# Patient Record
Sex: MALE | Race: BLACK OR AFRICAN AMERICAN | NOT HISPANIC OR LATINO | Employment: OTHER | URBAN - METROPOLITAN AREA
[De-identification: names, ages, dates, MRNs, and addresses within clinical notes are randomized per-mention and may not be internally consistent; named-entity substitution may affect disease eponyms.]

---

## 2017-01-03 ENCOUNTER — OFFICE VISIT (OUTPATIENT)
Dept: FAMILY MEDICINE CLINIC | Facility: CLINIC | Age: 72
End: 2017-01-03

## 2017-01-03 VITALS
HEART RATE: 85 BPM | BODY MASS INDEX: 22.44 KG/M2 | SYSTOLIC BLOOD PRESSURE: 162 MMHG | WEIGHT: 165.7 LBS | HEIGHT: 72 IN | TEMPERATURE: 98 F | DIASTOLIC BLOOD PRESSURE: 100 MMHG | OXYGEN SATURATION: 96 %

## 2017-01-03 DIAGNOSIS — I82.509 CHRONIC DEEP VEIN THROMBOSIS (DVT) OF LOWER EXTREMITY, UNSPECIFIED LATERALITY, UNSPECIFIED VEIN (HCC): Primary | ICD-10-CM

## 2017-01-03 DIAGNOSIS — Z79.01 LONG TERM CURRENT USE OF ANTICOAGULANTS WITH INR GOAL OF 2.0-3.0: ICD-10-CM

## 2017-01-03 LAB — INR PPP: 1.8 (ref 0.9–1.1)

## 2017-01-03 PROCEDURE — 85610 PROTHROMBIN TIME: CPT | Performed by: INTERNAL MEDICINE

## 2017-01-03 PROCEDURE — 36416 COLLJ CAPILLARY BLOOD SPEC: CPT | Performed by: INTERNAL MEDICINE

## 2017-01-03 PROCEDURE — 99213 OFFICE O/P EST LOW 20 MIN: CPT | Performed by: INTERNAL MEDICINE

## 2017-01-03 RX ORDER — DOXYCYCLINE HYCLATE 100 MG/1
100 CAPSULE ORAL 2 TIMES DAILY
Qty: 20 CAPSULE | Refills: 0 | Status: SHIPPED | OUTPATIENT
Start: 2017-01-03 | End: 2017-03-24

## 2017-01-03 NOTE — MR AVS SNAPSHOT
Narciso Romano   1/3/2017 7:50 AM   Office Visit    Dept Phone:  [phone]   Encounter #:  [id number]    Provider:  Jacinto Black Jr., MD   Department:  [facility] FAMILY AND INTERNAL MED                Your Full Care Plan              Today's Medication Changes          These changes are accurate as of: 1/3/17  8:19 AM.  If you have any questions, ask your nurse or doctor.               New Medication(s)Ordered:     doxycycline 100 MG capsule   Commonly known as:  VIBRAMYCIN   Take 1 capsule by mouth 2 (Two) Times a Day.   Started by:  Jacinto Black Jr., MD         Stop taking medication(s)listed here:     amoxicillin 500 MG capsule   Commonly known as:  AMOXIL   Stopped by:  Jacinto Black Jr., MD           clindamycin 300 MG capsule   Commonly known as:  CLEOCIN   Stopped by:  Jacinto Black Jr., MD                Where to Get Your Medications      These medications were sent to [facility]/pharmacy #[id number] [place] [id number] [place] [address] RD AT [facility] [phone] [place] [phone] [place] [address]     Phone:  [phone]     doxycycline 100 MG capsule                  Your Updated Medication List          This list is accurate as of: 1/3/17  8:19 AM.  Always use your most recent med list.                acyclovir 800 MG tablet   Commonly known as:  ZOVIRAX       albuterol 108 (90 BASE) MCG/ACT inhaler   Commonly known as:  PROVENTIL HFA;VENTOLIN HFA       aspirin 81 MG EC tablet       doxycycline 100 MG capsule   Commonly known as:  VIBRAMYCIN   Take 1 capsule by mouth 2 (Two) Times a Day.       finasteride 5 MG tablet   Commonly known as:  PROSCAR       fluticasone 50 MCG/ACT nasal spray   Commonly known as:  FLONASE   2 sprays into each nostril daily. 2 SPRAYS IN EACH NOSTRIL DAILY       Fluticasone Furoate-Vilanterol 100-25 MCG/INH aerosol powder    Commonly known as:  BREO ELLIPTA   Inhale 1 puff Daily.       hydrALAZINE 100 MG tablet   Commonly known as:  APRESOLINE   TAKE 1 TABLET 3 TIMES DAILY       isosorbide dinitrate 20 MG tablet   Commonly known as:  ISORDIL   TAKE 1 TABLET THREE TIMES A DAY       levothyroxine 100 MCG tablet   Commonly known as:  SYNTHROID   Take 1 tablet by mouth Daily.       metoprolol succinate XL 25 MG 24 hr tablet   Commonly known as:  TOPROL-XL       NIFEdipine 10 MG capsule   Commonly known as:  PROCARDIA       NITROSTAT 0.4 MG SL tablet   Generic drug:  nitroglycerin   1 TABLET UNDER THE TONGUE EVERY 5 MIN .AS NEEDED FOR CHEST PAIN.MAX OF 3 DOSES       pantoprazole 40 MG EC tablet   Commonly known as:  PROTONIX       predniSONE 10 MG tablet   Commonly known as:  DELTASONE       tamsulosin 0.4 MG capsule 24 hr capsule   Commonly known as:  FLOMAX   TAKE ONE CAPSULE EVERY DAY       warfarin 5 MG tablet   Commonly known as:  COUMADIN   TAKE 2 TABLETS EVERY DAY               We Performed the Following     POC INR       Instructions     None    Patient Instructions History      Anticoagulation Summary as of 1/3/2017     INR goal    Today's INR 1.80   Next INR check 1/10/2017    Indications   DVT (deep venous thrombosis) [I82.409]         January 2017 Details    Sun Mon Tue Wed Thu Fri Sat     1               2               3      5 mg   See details      4      5 mg         5      2.5 mg         6      5 mg         7      5 mg           8      2.5 mg         9      5 mg         10      5 mg         11               12               13               14                 15               16               17               18               19               20               21                 22               23               24               25               26               27               28                 29               30               31                    Date Details   01/03 This INR check       Date of next INR:  1/10/2017         How to take your warfarin dose     To take:  2.5 mg  Take 0.5 of a 5 mg tablet.    To take:  5 mg Take 1 of the 5 mg tablets.           Upcoming Appointments     Visit Type Date Time Department    OFFICE VISIT 1/3/2017  7:50 AM [facility]    SAME DAY 1/10/2017  8:05 AM [facility]    FOLLOW UP 2017  1:45 PM [facility]      [employer] Signup     [facility] [employer] allows you to send messages to your doctor, view your test results, renew your prescriptions, schedule appointments, and more. To sign up, go to [employer] and click on the Sign Up Now link in the New User? box. Enter your [employer] Activation Code exactly as it appears below along with the last four digits of your Social Security Number and your Date of Birth () to complete the sign-up process. If you do not sign up before the expiration date, you must request a new code.    [employer] Activation Code: [email or web address]  Expires: 2017  8:18 AM    If you have questions, you can email Bharat [email or web address] or call [phone] to talk to our [employer] staff. Remember, [employer] is NOT to be used for urgent needs. For medical emergencies, dial 911.               Other Info from Your Visit           Your Appointments     Lobo 10, 2017  8:05 AM EST   Same Day with Jacinto Black Jr., MD   [facility] FAMILY AND INTERNAL MED (--)    [address] [facility] [address] [phone]            2017  1:45 PM EST   Follow Up with Arthur Franks MD   [facility] GASTROENTEROLOGY (--)    [address] [facility] [address]  [facility] [address] [phone]           Arrive 15 minutes prior to appointment.              Allergies     Keflex [Cephalexin] Allergy Hives    Ciprofloxacin      Levoxyl [Levothyroxine]      Lisinopril      Norvasc [Amlodipine Besylate]      Tiazac [Diltiazem Hcl Er Beads]        Reason for Visit     Anticoagulation clotting disorder      Vital Signs     Blood Pressure Pulse Temperature Height Weight Oxygen  "Saturation    162/100 (BP Location: Left arm, Patient Position: Sitting, Cuff Size: Adult) 85 98 °F (36.7 °C) (Oral) 72\" (182.9 cm) 165 lb 11.2 oz (75.2 kg) 96%    Body Mass Index Smoking Status                22.47 kg/m2 Former Smoker          Results     POC INR      Component Value Standard Range & Units    INR 1.80 0.9 - 1.1                    "

## 2017-01-03 NOTE — PROGRESS NOTES
Subjective   Narciso Romano is a 71 y.o. male.   dvt  anticoag w coumadin  History of Present Illness   Compliant with medication, patient's report having cabbage on New Year's Day his only dietary indiscretion as probable reason for INR being slightly low    Review of Systems   All other systems reviewed and are negative.      Objective   Vitals:    01/03/17 0759   BP: 162/100   Pulse: 85   Temp: 98 °F (36.7 °C)   SpO2: 96%   Weight: 165 lb 11.2 oz (75.2 kg)     Physical Exam   Constitutional: He appears well-developed and well-nourished.   Cardiovascular: Normal rate, regular rhythm and normal heart sounds.    Pulmonary/Chest: Effort normal and breath sounds normal.   Nursing note and vitals reviewed.      Lab Results   Component Value Date    INR 1.80 (A) 01/03/2017    INR 2.00 (A) 12/02/2016    INR 2.60 (A) 11/07/2016       Procedures    Assessment/Plan  1.  Atrial fibrillation    2.  Anticoagulation with Coumadin plan continue present Coumadin  5/2.5 inr 1wk avoid vitamin K rich foods INR4- 7 days     Much of this encounter note is an electronic transcription/translation of spoken language to printed text.  The electronic translation of spoken language may permit erroneous, or at times, nonsensical words or phrases to be inadvertently transcribed.  Although I have reviewed the note for such errors, some may still exist.

## 2017-01-10 ENCOUNTER — OFFICE VISIT (OUTPATIENT)
Dept: FAMILY MEDICINE CLINIC | Facility: CLINIC | Age: 72
End: 2017-01-10

## 2017-01-10 VITALS
BODY MASS INDEX: 22.37 KG/M2 | DIASTOLIC BLOOD PRESSURE: 90 MMHG | TEMPERATURE: 97.8 F | OXYGEN SATURATION: 93 % | WEIGHT: 165.2 LBS | SYSTOLIC BLOOD PRESSURE: 146 MMHG | HEART RATE: 80 BPM | HEIGHT: 72 IN

## 2017-01-10 DIAGNOSIS — I82.499 DEEP VEIN THROMBOSIS (DVT) OF OTHER VEIN OF LOWER EXTREMITY: Primary | ICD-10-CM

## 2017-01-10 DIAGNOSIS — Z79.01 LONG TERM CURRENT USE OF ANTICOAGULANTS WITH INR GOAL OF 2.0-3.0: ICD-10-CM

## 2017-01-10 LAB — INR PPP: 2.5 (ref 0.9–1.1)

## 2017-01-10 PROCEDURE — 99213 OFFICE O/P EST LOW 20 MIN: CPT | Performed by: INTERNAL MEDICINE

## 2017-01-10 PROCEDURE — 36416 COLLJ CAPILLARY BLOOD SPEC: CPT | Performed by: INTERNAL MEDICINE

## 2017-01-10 PROCEDURE — 85610 PROTHROMBIN TIME: CPT | Performed by: INTERNAL MEDICINE

## 2017-01-10 RX ORDER — MONTELUKAST SODIUM 10 MG/1
10 TABLET ORAL NIGHTLY
Qty: 30 TABLET | Refills: 0 | Status: SHIPPED | OUTPATIENT
Start: 2017-01-10 | End: 2017-02-10 | Stop reason: SDUPTHER

## 2017-01-10 RX ORDER — SILDENAFIL CITRATE 20 MG/1
20 TABLET ORAL DAILY
Qty: 10 TABLET | Refills: 3 | Status: SHIPPED | OUTPATIENT
Start: 2017-01-10 | End: 2021-01-01 | Stop reason: CLARIF

## 2017-01-10 NOTE — PROGRESS NOTES
Subjective   Narciso Romano is a 71 y.o. male.   DVT, anticoagulation with Coumadin  History of Present Illness   Patient is doing fairly well currently on a 2.5/5/2.5/5 mg regimen no dietary indiscretions.    Review of Systems   All other systems reviewed and are negative.      Objective   Vitals:    01/10/17 0814   BP: 146/90   Pulse: 80   Temp: 97.8 °F (36.6 °C)   SpO2: 93%   Weight: 165 lb 3.2 oz (74.9 kg)     Physical Exam   Constitutional: He appears well-developed and well-nourished.   Cardiovascular: Normal rate, regular rhythm and normal heart sounds.    Pulmonary/Chest: Effort normal and breath sounds normal.   Nursing note and vitals reviewed.      Lab Results   Component Value Date    INR 2.50 (A) 01/10/2017    INR 1.80 (A) 01/03/2017    INR 2.00 (A) 12/02/2016       Procedures    Assessment/Plan   1.  DVT    2.  Anticoagulation with Coumadin long-term target range 2.0-3.0 plan continue 2.5/5 regimen INR in 1 month.    Much of this encounter note is an electronic transcription/translation of spoken language to printed text.  The electronic translation of spoken language may permit erroneous, or at times, nonsensical words or phrases to be inadvertently transcribed.  Although I have reviewed the note for such errors, some may still exist.

## 2017-01-10 NOTE — MR AVS SNAPSHOT
Narciso Romano   1/10/2017 8:05 AM   Office Visit    Dept Phone:  [phone]   Encounter #:  [id number]    Provider:  Jacinto Black Jr., MD   Department:  [facility] FAMILY AND INTERNAL MED                Your Full Care Plan              Today's Medication Changes          These changes are accurate as of: 1/10/17  8:40 AM.  If you have any questions, ask your nurse or doctor.               New Medication(s)Ordered:     montelukast 10 MG tablet   Commonly known as:  SINGULAIR   Take 1 tablet by mouth Every Night.   Started by:  Jacinto Black Jr., MD       sildenafil 20 MG tablet   Commonly known as:  REVATIO   Take 1 tablet by mouth Daily.   Started by:  Jacinto Black Jr., MD            Where to Get Your Medications      These medications were sent to [facility]/pharmacy #[id number] [address] AT [facility] [phone] [facility] [phone] [address], [place] [address]     Phone:  [phone]     montelukast 10 MG tablet         You can get these medications from any pharmacy     Bring a paper prescription for each of these medications     sildenafil 20 MG tablet                  Your Updated Medication List          This list is accurate as of: 1/10/17  8:40 AM.  Always use your most recent med list.                acyclovir 800 MG tablet   Commonly known as:  ZOVIRAX       albuterol 108 (90 BASE) MCG/ACT inhaler   Commonly known as:  PROVENTIL HFA;VENTOLIN HFA       aspirin 81 MG EC tablet       doxycycline 100 MG capsule   Commonly known as:  VIBRAMYCIN   Take 1 capsule by mouth 2 (Two) Times a Day.       finasteride 5 MG tablet   Commonly known as:  PROSCAR       fluticasone 50 MCG/ACT nasal spray   Commonly known as:  FLONASE   2 sprays into each nostril daily. 2 SPRAYS IN EACH NOSTRIL DAILY       Fluticasone Furoate-Vilanterol 100-25 MCG/INH aerosol powder    Commonly known as:  BREO ELLIPTA   Inhale 1 puff  Daily.       hydrALAZINE 100 MG tablet   Commonly known as:  APRESOLINE   TAKE 1 TABLET 3 TIMES DAILY       isosorbide dinitrate 20 MG tablet   Commonly known as:  ISORDIL   TAKE 1 TABLET THREE TIMES A DAY       levothyroxine 100 MCG tablet   Commonly known as:  SYNTHROID   Take 1 tablet by mouth Daily.       metoprolol succinate XL 25 MG 24 hr tablet   Commonly known as:  TOPROL-XL       montelukast 10 MG tablet   Commonly known as:  SINGULAIR   Take 1 tablet by mouth Every Night.       NIFEdipine 10 MG capsule   Commonly known as:  PROCARDIA       NITROSTAT 0.4 MG SL tablet   Generic drug:  nitroglycerin   1 TABLET UNDER THE TONGUE EVERY 5 MIN .AS NEEDED FOR CHEST PAIN.MAX OF 3 DOSES       pantoprazole 40 MG EC tablet   Commonly known as:  PROTONIX       predniSONE 10 MG tablet   Commonly known as:  DELTASONE       sildenafil 20 MG tablet   Commonly known as:  REVATIO   Take 1 tablet by mouth Daily.       tamsulosin 0.4 MG capsule 24 hr capsule   Commonly known as:  FLOMAX   TAKE ONE CAPSULE EVERY DAY       warfarin 5 MG tablet   Commonly known as:  COUMADIN   TAKE 2 TABLETS EVERY DAY               We Performed the Following     POC INR       Instructions     None    Patient Instructions History      Anticoagulation Summary as of 1/10/2017     INR goal    Today's INR 2.50   Next INR check 2/10/2017    Indications   DVT (deep venous thrombosis) [I82.409]         January 2017 Details    Sun Mon Tue Wed Thu Fri Sat     1               2               3               4               5               6               7                 8               9               10      5 mg   See details      11      2.5 mg         12      5 mg         13      2.5 mg         14      5 mg           15      2.5 mg         16      5 mg         17      2.5 mg         18      5 mg         19      2.5 mg         20      5 mg         21      2.5 mg           22      5 mg         23      2.5 mg         24      5 mg         25      2.5 mg          26      5 mg         27      2.5 mg         28      5 mg           29      2.5 mg         30      5 mg         31      2.5 mg              Date Details   01/10 This INR check               How to take your warfarin dose     To take:  2.5 mg Take 0.5 of a 5 mg tablet.    To take:  5 mg Take 1 of the 5 mg tablets.           2017 Details    Sun Mon e Wed Thu Fri Sat        1      5 mg         2      2.5 mg         3      5 mg         4      2.5 mg           5      5 mg         6      2.5 mg         7      5 mg         8      2.5 mg         9      5 mg         10      2.5 mg         11                 12               13               14               15               16               17               18                 19               20               21               22               23               24               25                 26               27               28                    Date Details   No additional details    Date of next INR:  2/10/2017         How to take your warfarin dose     To take:  2.5 mg Take 0.5 of a 5 mg tablet.    To take:  5 mg Take 1 of the 5 mg tablets.           Upcoming Appointments     Visit Type Date Time Department    SAME DAY 1/10/2017  8:05 AM [facility]    FOLLOW UP 2017  1:45 PM [facility]    OFFICE VISIT 2/10/2017  7:50 AM [facility]      [employer] Signup     [facility] [employer] allows you to send messages to your doctor, view your test results, renew your prescriptions, schedule appointments, and more. To sign up, go to [employer] and click on the Sign Up Now link in the New User? box. Enter your [employer] [employer] Activation Code exactly as it appears below along with the last four digits of your Social Security Number and your Date of Birth () to complete the sign-up process. If you do not sign up before the expiration date, you must request a new code.    [employer] Activation Code: [email or web address]  Expires: 2017   "8:18 AM    If you have questions, you can email [email or web address] or call [phone] to talk to our MyChart staff. Remember, [email or web address] is NOT to be used for urgent needs. For medical emergencies, dial 911.               Other Info from Your Visit           Your Appointments     Jan 30, 2017  1:45 PM EST   Follow Up with Arthur Franks MD   [facility] GASTROENTEROLOGY (--)    [address]  [facility] [address] [phone]           Arrive 15 minutes prior to appointment.            Feb 10, 2017  7:50 AM EST   Office Visit with Jacinto Black Jr., MD   [facility] FAMILY AND INTERNAL MED (--)    [address] [facility] [address] [phone]           Arrive 15 minutes prior to appointment.              Allergies     Keflex [Cephalexin] Allergy Hives    Ciprofloxacin      Levoxyl [Levothyroxine]      Lisinopril      Norvasc [Amlodipine Besylate]      Tiazac [Diltiazem Hcl Er Beads]        Reason for Visit     Coagulation Disorder           Vital Signs     Blood Pressure Pulse Temperature Height Weight Oxygen Saturation    146/90 (BP Location: Left arm, Patient Position: Sitting, Cuff Size: Adult) 80 97.8 °F (36.6 °C) (Oral) 72\" (182.9 cm) 165 lb 3.2 oz (74.9 kg) 93%    Body Mass Index Smoking Status                22.41 kg/m2 Former Smoker          Results     POC INR      Component Value Standard Range & Units    INR 2.50 0.9 - 1.1                    "

## 2017-01-27 RX ORDER — ACYCLOVIR 800 MG/1
TABLET ORAL
Qty: 35 TABLET | Refills: 2 | Status: SHIPPED | OUTPATIENT
Start: 2017-01-27 | End: 2019-03-15

## 2017-02-10 ENCOUNTER — OFFICE VISIT (OUTPATIENT)
Dept: FAMILY MEDICINE CLINIC | Facility: CLINIC | Age: 72
End: 2017-02-10

## 2017-02-10 VITALS
DIASTOLIC BLOOD PRESSURE: 86 MMHG | HEART RATE: 74 BPM | WEIGHT: 164.8 LBS | OXYGEN SATURATION: 94 % | TEMPERATURE: 97.7 F | SYSTOLIC BLOOD PRESSURE: 150 MMHG | HEIGHT: 72 IN | BODY MASS INDEX: 22.32 KG/M2

## 2017-02-10 DIAGNOSIS — Z79.01 LONG TERM CURRENT USE OF ANTICOAGULANTS WITH INR GOAL OF 2.0-3.0: Primary | ICD-10-CM

## 2017-02-10 DIAGNOSIS — I82.499 DEEP VEIN THROMBOSIS (DVT) OF OTHER VEIN OF LOWER EXTREMITY: ICD-10-CM

## 2017-02-10 LAB — INR PPP: 1.8 (ref 0.9–1.1)

## 2017-02-10 PROCEDURE — 99213 OFFICE O/P EST LOW 20 MIN: CPT | Performed by: INTERNAL MEDICINE

## 2017-02-10 PROCEDURE — 85610 PROTHROMBIN TIME: CPT | Performed by: INTERNAL MEDICINE

## 2017-02-10 PROCEDURE — 36416 COLLJ CAPILLARY BLOOD SPEC: CPT | Performed by: INTERNAL MEDICINE

## 2017-02-10 RX ORDER — MONTELUKAST SODIUM 10 MG/1
TABLET ORAL
Qty: 30 TABLET | Refills: 5 | Status: SHIPPED | OUTPATIENT
Start: 2017-02-10 | End: 2018-02-16 | Stop reason: SDUPTHER

## 2017-02-10 NOTE — PROGRESS NOTES
Subjective   Narciso Romano is a 71 y.o. male.   History of DVT on Coumadin here for medication monitoring.  History of Present Illness   Doing fairly well had a salad about one week ago actually 5 days ago and also had broccoli somewhat more recently this week compliant with medications.  No other complaints    Review of Systems   All other systems reviewed and are negative.      Objective   Vitals:    02/10/17 0803   BP: 150/86   Pulse: 74   Temp: 97.7 °F (36.5 °C)   SpO2: 94%   Weight: 164 lb 12.8 oz (74.8 kg)     Physical Exam   Constitutional: He appears well-developed and well-nourished.   Cardiovascular: Normal rate, regular rhythm and normal heart sounds.    Nursing note and vitals reviewed.      Lab Results   Component Value Date    INR 1.80 (A) 02/10/2017    INR 2.50 (A) 01/10/2017    INR 1.80 (A) 01/03/2017       Procedures    Assessment/Plan  1.  DVT    2..  Anticoagulation with Coumadin with Coumadin plan continue present  5/2.5 alternating regimen watch vitamin K rich foods repeat INR 1 week's time.    Much of this encounter note is an electronic transcription/translation of spoken language to printed text.  The electronic translation of spoken language may permit erroneous, or at times, nonsensical words or phrases to be inadvertently transcribed.  Although I have reviewed the note for such errors, some may still exist.

## 2017-02-17 ENCOUNTER — OFFICE VISIT (OUTPATIENT)
Dept: FAMILY MEDICINE CLINIC | Facility: CLINIC | Age: 72
End: 2017-02-17

## 2017-02-17 VITALS
BODY MASS INDEX: 22.32 KG/M2 | HEART RATE: 74 BPM | HEIGHT: 72 IN | DIASTOLIC BLOOD PRESSURE: 80 MMHG | OXYGEN SATURATION: 95 % | TEMPERATURE: 97.8 F | WEIGHT: 164.8 LBS | SYSTOLIC BLOOD PRESSURE: 138 MMHG

## 2017-02-17 DIAGNOSIS — I82.499 DEEP VEIN THROMBOSIS (DVT) OF OTHER VEIN OF LOWER EXTREMITY: ICD-10-CM

## 2017-02-17 DIAGNOSIS — Z79.01 LONG TERM CURRENT USE OF ANTICOAGULANTS WITH INR GOAL OF 2.0-3.0: Primary | ICD-10-CM

## 2017-02-17 LAB — INR PPP: 2.3 (ref 0.9–1.1)

## 2017-02-17 PROCEDURE — 36416 COLLJ CAPILLARY BLOOD SPEC: CPT | Performed by: INTERNAL MEDICINE

## 2017-02-17 PROCEDURE — 85610 PROTHROMBIN TIME: CPT | Performed by: INTERNAL MEDICINE

## 2017-02-17 PROCEDURE — 99213 OFFICE O/P EST LOW 20 MIN: CPT | Performed by: INTERNAL MEDICINE

## 2017-02-17 NOTE — PROGRESS NOTES
Subjective   Narciso Romano is a 71 y.o. male.   DVT, anticoagulation with Coumadin  History of Present Illness   Without medications been doing better with his diet regarding vitamin K.  Is here follow-up on his INR continuing with 5/2.5 alternating dose although avoiding salads etc. better.    Review of Systems   All other systems reviewed and are negative.      Objective   Vitals:    02/17/17 0809   BP: 138/80   Pulse: 74   Temp: 97.8 °F (36.6 °C)   SpO2: 95%   Weight: 164 lb 12.8 oz (74.8 kg)     Physical Exam   Constitutional: He appears well-developed and well-nourished.   Cardiovascular: Normal rate, regular rhythm and normal heart sounds.    Pulmonary/Chest: Effort normal and breath sounds normal.   Abdominal: Soft. Bowel sounds are normal.   Nursing note and vitals reviewed.      Lab Results   Component Value Date    INR 2.30 (A) 02/17/2017    INR 1.80 (A) 02/10/2017    INR 2.50 (A) 01/10/2017       Procedures    Assessment/Plan     dvt    Anticoagulation with Coumadin long-term target range 2.0-3.0 plan continue present Coumadin 5/2.5/5/2.5  inr 1mo.    Much of this encounter note is an electronic transcription/translation of spoken language to printed text.  The electronic translation of spoken language may permit erroneous, or at times, nonsensical words or phrases to be inadvertently transcribed.  Although I have reviewed the note for such errors, some may still exist.

## 2017-03-18 RX ORDER — WARFARIN SODIUM 5 MG/1
TABLET ORAL
Qty: 60 TABLET | Refills: 4 | Status: SHIPPED | OUTPATIENT
Start: 2017-03-18 | End: 2018-04-16 | Stop reason: SDUPTHER

## 2017-03-24 ENCOUNTER — TELEPHONE (OUTPATIENT)
Dept: FAMILY MEDICINE CLINIC | Facility: CLINIC | Age: 72
End: 2017-03-24

## 2017-03-24 ENCOUNTER — OFFICE VISIT (OUTPATIENT)
Dept: FAMILY MEDICINE CLINIC | Facility: CLINIC | Age: 72
End: 2017-03-24

## 2017-03-24 VITALS
SYSTOLIC BLOOD PRESSURE: 124 MMHG | OXYGEN SATURATION: 94 % | TEMPERATURE: 98.5 F | HEART RATE: 96 BPM | HEIGHT: 72 IN | WEIGHT: 165 LBS | BODY MASS INDEX: 22.35 KG/M2 | DIASTOLIC BLOOD PRESSURE: 84 MMHG

## 2017-03-24 DIAGNOSIS — Z95.810 AICD (AUTOMATIC CARDIOVERTER/DEFIBRILLATOR) PRESENT: ICD-10-CM

## 2017-03-24 DIAGNOSIS — I82.499 DEEP VEIN THROMBOSIS (DVT) OF OTHER VEIN OF LOWER EXTREMITY: Primary | ICD-10-CM

## 2017-03-24 DIAGNOSIS — I42.8 NICM (NONISCHEMIC CARDIOMYOPATHY) (HCC): Primary | ICD-10-CM

## 2017-03-24 DIAGNOSIS — Z79.01 LONG TERM CURRENT USE OF ANTICOAGULANTS WITH INR GOAL OF 2.0-3.0: ICD-10-CM

## 2017-03-24 LAB — INR PPP: 2.1 (ref 0.9–1.1)

## 2017-03-24 PROCEDURE — 85610 PROTHROMBIN TIME: CPT | Performed by: INTERNAL MEDICINE

## 2017-03-24 PROCEDURE — 99213 OFFICE O/P EST LOW 20 MIN: CPT | Performed by: INTERNAL MEDICINE

## 2017-03-24 PROCEDURE — 36416 COLLJ CAPILLARY BLOOD SPEC: CPT | Performed by: INTERNAL MEDICINE

## 2017-03-24 RX ORDER — FLUTICASONE PROPIONATE 50 MCG
2 SPRAY, SUSPENSION (ML) NASAL DAILY
Qty: 1 EACH | Refills: 11 | Status: SHIPPED | OUTPATIENT
Start: 2017-03-24 | End: 2017-12-20

## 2017-03-24 NOTE — TELEPHONE ENCOUNTER
Referral entered    ----- Message from Belkis Morris sent at 3/24/2017  8:25 AM EDT -----  Contact: [phone]  Needs ref to [facility] cardiology / his expires after 1 year.

## 2017-03-24 NOTE — PROGRESS NOTES
Subjective   Narciso Romano is a 71 y.o. male.   DVT, anticoagulation with Coumadin  History of Present Illness   Patient doing fairly well  medication monitoring today blood pressuredoing fairly well.    Review of Systems   All other systems reviewed and are negative.      Objective   Vitals:    03/24/17 0759   BP: 124/84   Pulse: 96   Temp: 98.5 °F (36.9 °C)   SpO2: 94%   Weight: 165 lb (74.8 kg)     Physical Exam   Constitutional: He appears well-developed and well-nourished.   Cardiovascular: Normal rate, regular rhythm and normal heart sounds.    Pulmonary/Chest: Effort normal and breath sounds normal.   Nursing note and vitals reviewed.      Lab Results   Component Value Date    INR 2.30 (A) 02/17/2017    INR 1.80 (A) 02/10/2017    INR 2.50 (A) 01/10/2017       Procedures    Assessment/Plan   1.  DVT    2.  Anticoagulation with Coumadin target range 2.0-3.0 plan Coumadin  5/2.5/5/2.5 cycle, continue    inr 1mo    Much of this encounter note is an electronic transcription/translation of spoken language to printed text.  The electronic translation of spoken language may permit erroneous, or at times, nonsensical words or phrases to be inadvertently transcribed.  Although I have reviewed the note for such errors, some may still exist.

## 2017-04-15 RX ORDER — HYDRALAZINE HYDROCHLORIDE 100 MG/1
TABLET, FILM COATED ORAL
Qty: 270 TABLET | Refills: 0 | Status: SHIPPED | OUTPATIENT
Start: 2017-04-15 | End: 2017-11-24 | Stop reason: SDUPTHER

## 2017-04-21 ENCOUNTER — OFFICE VISIT (OUTPATIENT)
Dept: FAMILY MEDICINE CLINIC | Facility: CLINIC | Age: 72
End: 2017-04-21

## 2017-04-21 VITALS
HEART RATE: 69 BPM | DIASTOLIC BLOOD PRESSURE: 60 MMHG | BODY MASS INDEX: 22.59 KG/M2 | SYSTOLIC BLOOD PRESSURE: 130 MMHG | WEIGHT: 166.8 LBS | HEIGHT: 72 IN | OXYGEN SATURATION: 90 % | TEMPERATURE: 97.6 F

## 2017-04-21 DIAGNOSIS — Z79.01 LONG TERM CURRENT USE OF ANTICOAGULANTS WITH INR GOAL OF 2.0-3.0: Primary | ICD-10-CM

## 2017-04-21 DIAGNOSIS — I82.499 DEEP VEIN THROMBOSIS (DVT) OF OTHER VEIN OF LOWER EXTREMITY: ICD-10-CM

## 2017-04-21 LAB — INR PPP: 1.8 (ref 0.9–1.1)

## 2017-04-21 PROCEDURE — 85610 PROTHROMBIN TIME: CPT | Performed by: INTERNAL MEDICINE

## 2017-04-21 PROCEDURE — 99213 OFFICE O/P EST LOW 20 MIN: CPT | Performed by: INTERNAL MEDICINE

## 2017-04-21 PROCEDURE — 36416 COLLJ CAPILLARY BLOOD SPEC: CPT | Performed by: INTERNAL MEDICINE

## 2017-04-21 NOTE — PROGRESS NOTES
Subjective   Narciso Romano is a 71 y.o. male.   DVT, anticoagulation with Coumadin  History of Present Illness   Dietary indiscretions over Easter holiday  5/2.5 alternating is his usual routine salad and greens intake increased  Review of Systems   All other systems reviewed and are negative.      Objective   Vitals:    04/21/17 0800   BP: 130/60   Pulse: 69   Temp: 97.6 °F (36.4 °C)   SpO2: 90%   Weight: 166 lb 12.8 oz (75.7 kg)     Physical Exam   Constitutional: He appears well-developed and well-nourished.   Cardiovascular: Normal rate, regular rhythm and normal heart sounds.    Pulmonary/Chest: Effort normal and breath sounds normal.   Abdominal: Soft.   Nursing note and vitals reviewed.      Lab Results   Component Value Date    INR 1.80 (A) 04/21/2017    INR 2.10 (A) 03/24/2017    INR 2.30 (A) 02/17/2017       Procedures    Assessment/Plan 1.  DVT    2.  Anticoagulation with Coumadin target range 2.5-3.5 plan continue present 5 mg alternating with 2.5 Coumadin no vitamin K rich foods follow-up INR in 1 week    Much of this encounter note is an electronic transcription/translation of spoken language to printed text.  The electronic translation of spoken language may permit erroneous, or at times, nonsensical words or phrases to be inadvertently transcribed.  Although I have reviewed the note for such errors, some may still exist.

## 2017-04-28 ENCOUNTER — OFFICE VISIT (OUTPATIENT)
Dept: FAMILY MEDICINE CLINIC | Facility: CLINIC | Age: 72
End: 2017-04-28

## 2017-04-28 VITALS
DIASTOLIC BLOOD PRESSURE: 80 MMHG | HEIGHT: 72 IN | BODY MASS INDEX: 22.62 KG/M2 | TEMPERATURE: 97.6 F | OXYGEN SATURATION: 97 % | HEART RATE: 85 BPM | SYSTOLIC BLOOD PRESSURE: 146 MMHG | WEIGHT: 167 LBS

## 2017-04-28 DIAGNOSIS — I82.499 DEEP VEIN THROMBOSIS (DVT) OF OTHER VEIN OF LOWER EXTREMITY: ICD-10-CM

## 2017-04-28 DIAGNOSIS — Z79.01 LONG TERM CURRENT USE OF ANTICOAGULANTS WITH INR GOAL OF 2.0-3.0: Primary | ICD-10-CM

## 2017-04-28 LAB — INR PPP: 2.2 (ref 0.9–1.1)

## 2017-04-28 PROCEDURE — 85610 PROTHROMBIN TIME: CPT | Performed by: INTERNAL MEDICINE

## 2017-04-28 PROCEDURE — 99213 OFFICE O/P EST LOW 20 MIN: CPT | Performed by: INTERNAL MEDICINE

## 2017-04-28 PROCEDURE — 36416 COLLJ CAPILLARY BLOOD SPEC: CPT | Performed by: INTERNAL MEDICINE

## 2017-04-28 NOTE — PROGRESS NOTES
Subjective   Narciso Romano is a 71 y.o. male.   History of DVT compliant with Coumadin dosage regimen  History of Present Illness   5/2.5/5/2.5 his current dose for his DVT no recent problems.    Review of Systems   All other systems reviewed and are negative.      Objective   Vitals:    04/28/17 0802   BP: 146/80   Pulse: 85   Temp: 97.6 °F (36.4 °C)   SpO2: 97%   Weight: 167 lb (75.8 kg)     Physical Exam   Constitutional: He appears well-developed and well-nourished.   Cardiovascular: Normal rate, regular rhythm and normal heart sounds.    Pulmonary/Chest: Effort normal and breath sounds normal.   Nursing note and vitals reviewed.      Lab Results   Component Value Date    INR 2.20 (A) 04/28/2017    INR 1.80 (A) 04/21/2017    INR 2.10 (A) 03/24/2017       Procedures    Assessment/Plan   1.  DVT    2.  Anticoagulation with Coumadin long-term 2.0-3.0 plan continue present 5/2.5/5/2.5  INR 1 month    Much of this encounter note is an electronic transcription/translation of spoken language to printed text.  The electronic translation of spoken language may permit erroneous, or at times, nonsensical words or phrases to be inadvertently transcribed.  Although I have reviewed the note for such errors, some may still exist.

## 2017-05-03 RX ORDER — ISOSORBIDE DINITRATE 20 MG/1
TABLET ORAL
Qty: 90 TABLET | Refills: 3 | Status: SHIPPED | OUTPATIENT
Start: 2017-05-03 | End: 2018-06-13 | Stop reason: SDUPTHER

## 2017-05-26 ENCOUNTER — TELEPHONE (OUTPATIENT)
Dept: FAMILY MEDICINE CLINIC | Facility: CLINIC | Age: 72
End: 2017-05-26

## 2017-05-30 ENCOUNTER — OFFICE VISIT (OUTPATIENT)
Dept: FAMILY MEDICINE CLINIC | Facility: CLINIC | Age: 72
End: 2017-05-30

## 2017-05-30 VITALS
OXYGEN SATURATION: 95 % | TEMPERATURE: 97.7 F | HEIGHT: 72 IN | HEART RATE: 78 BPM | BODY MASS INDEX: 21.91 KG/M2 | WEIGHT: 161.8 LBS | SYSTOLIC BLOOD PRESSURE: 112 MMHG | DIASTOLIC BLOOD PRESSURE: 62 MMHG

## 2017-05-30 DIAGNOSIS — Z79.01 LONG TERM CURRENT USE OF ANTICOAGULANTS WITH INR GOAL OF 2.0-3.0: Primary | ICD-10-CM

## 2017-05-30 DIAGNOSIS — I82.499 DEEP VEIN THROMBOSIS (DVT) OF OTHER VEIN OF LOWER EXTREMITY: ICD-10-CM

## 2017-05-30 LAB — INR PPP: 3.6 (ref 0.9–1.1)

## 2017-05-30 PROCEDURE — 85610 PROTHROMBIN TIME: CPT | Performed by: INTERNAL MEDICINE

## 2017-05-30 PROCEDURE — 99213 OFFICE O/P EST LOW 20 MIN: CPT | Performed by: INTERNAL MEDICINE

## 2017-05-30 PROCEDURE — 36416 COLLJ CAPILLARY BLOOD SPEC: CPT | Performed by: INTERNAL MEDICINE

## 2017-06-06 ENCOUNTER — OFFICE VISIT (OUTPATIENT)
Dept: FAMILY MEDICINE CLINIC | Facility: CLINIC | Age: 72
End: 2017-06-06

## 2017-06-06 VITALS
WEIGHT: 162.6 LBS | DIASTOLIC BLOOD PRESSURE: 64 MMHG | HEART RATE: 73 BPM | BODY MASS INDEX: 22.02 KG/M2 | TEMPERATURE: 98 F | OXYGEN SATURATION: 93 % | HEIGHT: 72 IN | SYSTOLIC BLOOD PRESSURE: 120 MMHG

## 2017-06-06 DIAGNOSIS — I82.499 DEEP VEIN THROMBOSIS (DVT) OF OTHER VEIN OF LOWER EXTREMITY: ICD-10-CM

## 2017-06-06 DIAGNOSIS — Z79.01 LONG TERM CURRENT USE OF ANTICOAGULANTS WITH INR GOAL OF 2.0-3.0: Primary | ICD-10-CM

## 2017-06-06 LAB — INR PPP: 2.2 (ref 0.9–1.1)

## 2017-06-06 PROCEDURE — 99213 OFFICE O/P EST LOW 20 MIN: CPT | Performed by: INTERNAL MEDICINE

## 2017-06-06 PROCEDURE — 36416 COLLJ CAPILLARY BLOOD SPEC: CPT | Performed by: INTERNAL MEDICINE

## 2017-06-06 PROCEDURE — 85610 PROTHROMBIN TIME: CPT | Performed by: INTERNAL MEDICINE

## 2017-06-06 NOTE — PROGRESS NOTES
Subjective   Narciso Romano is a 71 y.o. male.   DVT, and aggression with Coumadin  History of Present Illness   No new complaints patient is compliant with his new dose of 5/2.5/2.5    Review of Systems   All other systems reviewed and are negative.      Objective   Vitals:    06/06/17 0803   BP: 120/64   Pulse: 73   Temp: 98 °F (36.7 °C)   SpO2: 93%   Weight: 162 lb 9.6 oz (73.8 kg)     Physical Exam   Constitutional: He appears well-developed and well-nourished.   Cardiovascular: Normal rate, regular rhythm and normal heart sounds.    Pulmonary/Chest: Effort normal and breath sounds normal.   Abdominal: Soft. Bowel sounds are normal.   Nursing note and vitals reviewed.      Lab Results   Component Value Date    INR 2.20 (A) 06/06/2017    INR 3.60 (A) 05/30/2017    INR 2.20 (A) 04/28/2017       Procedures    Assessment/Plan 1.  DVT    2.  Anticoagulation with Coumadin plan Coumadin  5/2.5//2.5  inr 2wk    Patient did ask if there is possibility could discontinue his Coumadin we will need to pull records as I do not have all information needed in present EMR.    Much of this encounter note is an electronic transcription/translation of spoken language to printed text.  The electronic translation of spoken language may permit erroneous, or at times, nonsensical words or phrases to be inadvertently transcribed.  Although I have reviewed the note for such errors, some may still exist.

## 2017-06-09 RX ORDER — TAMSULOSIN HYDROCHLORIDE 0.4 MG/1
CAPSULE ORAL
Qty: 90 CAPSULE | Refills: 3 | Status: SHIPPED | OUTPATIENT
Start: 2017-06-09 | End: 2018-06-13 | Stop reason: SDUPTHER

## 2017-06-20 ENCOUNTER — OFFICE VISIT (OUTPATIENT)
Dept: FAMILY MEDICINE CLINIC | Facility: CLINIC | Age: 72
End: 2017-06-20

## 2017-06-20 VITALS
SYSTOLIC BLOOD PRESSURE: 144 MMHG | OXYGEN SATURATION: 91 % | DIASTOLIC BLOOD PRESSURE: 68 MMHG | HEIGHT: 72 IN | TEMPERATURE: 97.6 F | BODY MASS INDEX: 21.45 KG/M2 | HEART RATE: 66 BPM | WEIGHT: 158.4 LBS

## 2017-06-20 DIAGNOSIS — Z79.01 LONG TERM CURRENT USE OF ANTICOAGULANTS WITH INR GOAL OF 2.0-3.0: Primary | ICD-10-CM

## 2017-06-20 DIAGNOSIS — I82.402 ACUTE DEEP VEIN THROMBOSIS (DVT) OF LEFT LOWER EXTREMITY, UNSPECIFIED VEIN (HCC): ICD-10-CM

## 2017-06-20 LAB — INR PPP: 2.1 (ref 0.9–1.1)

## 2017-06-20 PROCEDURE — 85610 PROTHROMBIN TIME: CPT | Performed by: INTERNAL MEDICINE

## 2017-06-20 PROCEDURE — 36416 COLLJ CAPILLARY BLOOD SPEC: CPT | Performed by: INTERNAL MEDICINE

## 2017-06-20 PROCEDURE — 99213 OFFICE O/P EST LOW 20 MIN: CPT | Performed by: INTERNAL MEDICINE

## 2017-06-20 RX ORDER — NITROGLYCERIN 0.4 MG/1
0.4 TABLET SUBLINGUAL
Qty: 25 TABLET | Refills: 5 | Status: SHIPPED | OUTPATIENT
Start: 2017-06-20 | End: 2018-09-07 | Stop reason: SDUPTHER

## 2017-06-20 RX ORDER — NIFEDIPINE 90 MG/1
90 TABLET, FILM COATED, EXTENDED RELEASE ORAL DAILY
Qty: 90 TABLET | Refills: 3 | Status: SHIPPED | OUTPATIENT
Start: 2017-06-20

## 2017-06-20 NOTE — PROGRESS NOTES
"Subjective   Narciso Romano is a 71 y.o. male.   DVT chronic anticoagulant with Coumadin doing fairly well  History of Present Illness   Patient has no complaints with the DVT is compliant with medication for Coumadin no dietary indiscretions recently had recent dose adjustment  now 5/2.5/2.5 no other complaints including cardiac and patient has known CAD    Review of Systems   Constitutional: Negative.    HENT: Negative.    Eyes: Negative.    Respiratory: Negative.    Cardiovascular: Negative.    Gastrointestinal: Negative.    Endocrine: Negative.    Genitourinary: Negative.    Musculoskeletal: Negative.    Allergic/Immunologic: Negative.    Neurological: Negative.    Hematological: Negative.    Psychiatric/Behavioral: Negative.    All other systems reviewed and are negative.      Objective   Vitals:    06/20/17 0804   BP: 144/68   Pulse: 66   Temp: 97.6 °F (36.4 °C)   SpO2: 91%   Weight: 158 lb 6.4 oz (71.8 kg)     Physical Exam   Constitutional: He appears well-developed and well-nourished.   HENT:   Head: Normocephalic and atraumatic.   Cardiovascular: Normal rate, regular rhythm and normal heart sounds.    Pulmonary/Chest: Effort normal and breath sounds normal.   Nursing note and vitals reviewed.      Lab Results   Component Value Date    INR 2.20 (A) 06/06/2017    INR 3.60 (A) 05/30/2017    INR 2.20 (A) 04/28/2017       Procedures    Assessment/Plan 1.  DVT    2.\"  With Coumadin long-term plan Coumadin    5/2.5/2.5 as a three-day cycle follow-up in one month's time    Much of this encounter note is an electronic transcription/translation of spoken language to printed text.  The electronic translation of spoken language may permit erroneous, or at times, nonsensical words or phrases to be inadvertently transcribed.  Although I have reviewed the note for such errors, some may still exist.         "

## 2017-06-20 NOTE — PROGRESS NOTES
Subjective   Narciso Romano is a 71 y.o. male.     History of Present Illness       Review of Systems   All other systems reviewed and are negative.      Objective   Vitals:    06/20/17 0804   BP: 144/68   Pulse: 66   Temp: 97.6 °F (36.4 °C)   SpO2: 91%   Weight: 158 lb 6.4 oz (71.8 kg)     Physical Exam    Lab Results   Component Value Date    INR 2.10 (A) 06/20/2017    INR 2.20 (A) 06/06/2017    INR 3.60 (A) 05/30/2017       Procedures    Assessment/Plan

## 2017-07-18 ENCOUNTER — OFFICE VISIT (OUTPATIENT)
Dept: FAMILY MEDICINE CLINIC | Facility: CLINIC | Age: 72
End: 2017-07-18

## 2017-07-18 VITALS
WEIGHT: 158.6 LBS | DIASTOLIC BLOOD PRESSURE: 82 MMHG | BODY MASS INDEX: 21.48 KG/M2 | HEART RATE: 70 BPM | SYSTOLIC BLOOD PRESSURE: 120 MMHG | HEIGHT: 72 IN | OXYGEN SATURATION: 97 % | TEMPERATURE: 97.8 F

## 2017-07-18 DIAGNOSIS — Z79.01 LONG TERM CURRENT USE OF ANTICOAGULANTS WITH INR GOAL OF 2.0-3.0: ICD-10-CM

## 2017-07-18 DIAGNOSIS — I82.599 CHRONIC DEEP VEIN THROMBOSIS (DVT) OF OTHER VEIN OF LOWER EXTREMITY: Primary | ICD-10-CM

## 2017-07-18 LAB
INR PPP: 2 (ref 0.9–1.1)
INR PPP: 2 (ref 0.9–1.1)

## 2017-07-18 PROCEDURE — 85610 PROTHROMBIN TIME: CPT | Performed by: INTERNAL MEDICINE

## 2017-07-18 PROCEDURE — 99213 OFFICE O/P EST LOW 20 MIN: CPT | Performed by: INTERNAL MEDICINE

## 2017-07-18 PROCEDURE — 36416 COLLJ CAPILLARY BLOOD SPEC: CPT | Performed by: INTERNAL MEDICINE

## 2017-07-18 NOTE — PROGRESS NOTES
"Subjective   Narciso Romano is a 71 y.o. male.   DVT,\" anticoagulation with Coumadin  History of Present Illness   5/2.5/2.5 his current three-day cycle is stable on this dose for a while  No chest pain complaints or other doing fairly well blood pressure she is at present.  Review of Systems   All other systems reviewed and are negative.      Objective   Vitals:    07/18/17 0803   BP: 120/82   Pulse: 70   Temp: 97.8 °F (36.6 °C)   SpO2: 97%   Weight: 158 lb 9.6 oz (71.9 kg)     Physical Exam   Constitutional: He appears well-developed and well-nourished.   HENT:   Head: Normocephalic and atraumatic.   Cardiovascular: Normal rate, regular rhythm and normal heart sounds.    Pulmonary/Chest: Effort normal and breath sounds normal.   Neurological: He is alert.   Nursing note and vitals reviewed.      Lab Results   Component Value Date    INR 2.00 (A) 07/18/2017    INR 2.00 (A) 07/18/2017    INR 2.10 (A) 06/20/2017       Procedures    Assessment/Plan   1.  DVT    2.  Anticoagulation with Coumadin long-term plan continue 5/2.5/2.5 cycle repeat INR in 1 month    Much of this encounter note is an electronic transcription/translation of spoken language to printed text.  The electronic translation of spoken language may permit erroneous, or at times, nonsensical words or phrases to be inadvertently transcribed.  Although I have reviewed the note for such errors, some may still exist.           "

## 2017-08-15 ENCOUNTER — OFFICE VISIT (OUTPATIENT)
Dept: FAMILY MEDICINE CLINIC | Facility: CLINIC | Age: 72
End: 2017-08-15

## 2017-08-15 VITALS
DIASTOLIC BLOOD PRESSURE: 90 MMHG | HEART RATE: 74 BPM | HEIGHT: 72 IN | WEIGHT: 162 LBS | TEMPERATURE: 98.4 F | BODY MASS INDEX: 21.94 KG/M2 | SYSTOLIC BLOOD PRESSURE: 132 MMHG | OXYGEN SATURATION: 97 %

## 2017-08-15 DIAGNOSIS — I82.599 CHRONIC DEEP VEIN THROMBOSIS (DVT) OF OTHER VEIN OF LOWER EXTREMITY: ICD-10-CM

## 2017-08-15 DIAGNOSIS — Z79.01 LONG TERM CURRENT USE OF ANTICOAGULANTS WITH INR GOAL OF 2.0-3.0: Primary | ICD-10-CM

## 2017-08-15 LAB — INR PPP: 1.7 (ref 0.9–1.1)

## 2017-08-15 PROCEDURE — 99213 OFFICE O/P EST LOW 20 MIN: CPT | Performed by: INTERNAL MEDICINE

## 2017-08-15 PROCEDURE — 85610 PROTHROMBIN TIME: CPT | Performed by: INTERNAL MEDICINE

## 2017-08-15 PROCEDURE — 36416 COLLJ CAPILLARY BLOOD SPEC: CPT | Performed by: INTERNAL MEDICINE

## 2017-08-15 NOTE — PROGRESS NOTES
Subjective   Narciso Romano is a 71 y.o. male.   DVT, anticoagulation with Coumadin  History of Present Illness   Ate brussels srpouts yest as a possible reason for low on INR otherwise INR is been readily stable last several months time.  Is compliant with medicines does take Coumadin 5/2.5/2.5 regimen.  No other complaints this point.    Review of Systems   All other systems reviewed and are negative.      Objective   Vitals:    08/15/17 0800   BP: 132/90   Pulse: 74   Temp: 98.4 °F (36.9 °C)   SpO2: 97%   Weight: 162 lb (73.5 kg)     Physical Exam   Constitutional: He appears well-developed and well-nourished.   HENT:   Head: Normocephalic and atraumatic.   Cardiovascular: Normal rate, regular rhythm and normal heart sounds.    Pulmonary/Chest: Effort normal and breath sounds normal.   Nursing note and vitals reviewed.      Lab Results   Component Value Date    INR 1.70 (A) 08/15/2017    INR 2.00 (A) 07/18/2017    INR 2.00 (A) 07/18/2017       Procedures    Assessment/Plan 1.  DVT    2.  Anticoagulation with Coumadin long-term plan continue Coumadin    Cont 5/2.5/2.5 cycle inr 2 weeks    Much of this encounter note is an electronic transcription/translation of spoken language to printed text.  The electronic translation of spoken language may permit erroneous, or at times, nonsensical words or phrases to be inadvertently transcribed.  Although I have reviewed the note for such errors, some may still exist.

## 2017-08-22 ENCOUNTER — OFFICE VISIT (OUTPATIENT)
Dept: FAMILY MEDICINE CLINIC | Facility: CLINIC | Age: 72
End: 2017-08-22

## 2017-08-22 VITALS
BODY MASS INDEX: 21.94 KG/M2 | RESPIRATION RATE: 18 BRPM | TEMPERATURE: 98 F | OXYGEN SATURATION: 96 % | HEIGHT: 72 IN | HEART RATE: 67 BPM | DIASTOLIC BLOOD PRESSURE: 70 MMHG | WEIGHT: 162 LBS | SYSTOLIC BLOOD PRESSURE: 158 MMHG

## 2017-08-22 DIAGNOSIS — I82.599 CHRONIC DEEP VEIN THROMBOSIS (DVT) OF OTHER VEIN OF LOWER EXTREMITY: Primary | ICD-10-CM

## 2017-08-22 DIAGNOSIS — Z79.01 LONG TERM CURRENT USE OF ANTICOAGULANTS WITH INR GOAL OF 2.0-3.0: ICD-10-CM

## 2017-08-22 LAB — INR PPP: 1.9 (ref 0.9–1.1)

## 2017-08-22 PROCEDURE — 85610 PROTHROMBIN TIME: CPT | Performed by: INTERNAL MEDICINE

## 2017-08-22 PROCEDURE — 99213 OFFICE O/P EST LOW 20 MIN: CPT | Performed by: INTERNAL MEDICINE

## 2017-08-22 PROCEDURE — 36416 COLLJ CAPILLARY BLOOD SPEC: CPT | Performed by: INTERNAL MEDICINE

## 2017-08-22 NOTE — PROGRESS NOTES
Subjective   Narciso Romano is a 71 y.o. male.   Patient with history of DVT long-term anticoagulation with Coumadin recent dose adjustments required.  History of Present Illness     Patient's been compliant with his dietary regimen doing fairly well INR today comes in at 1.9 we will increase him to 5 mg 3 days a week , 2.5 mg 4 days a week  No blood pressure chest pain complaints  Review of Systems   All other systems reviewed and are negative.      Objective   Vitals:    08/22/17 0747   BP: 158/70   Pulse: 67   Resp: 18   Temp: 98 °F (36.7 °C)   SpO2: 96%   Weight: 162 lb (73.5 kg)     Physical Exam   Constitutional: He appears well-developed and well-nourished.   HENT:   Head: Normocephalic and atraumatic.   Cardiovascular: Normal rate, regular rhythm and normal heart sounds.    Pulmonary/Chest: Effort normal and breath sounds normal.   Nursing note and vitals reviewed.      Lab Results   Component Value Date    INR 1.70 (A) 08/15/2017    INR 2.00 (A) 07/18/2017    INR 2.00 (A) 07/18/2017       Procedures    Assessment/Plan   1.  Chronic deep vein thrombosis lower extremity    2.  Anticoagulation with Coumadin target range 2.0-3.0 plan Coumadin 2.5 alternating with 5 mg with WEEKLY TARGET BEING 2.5 MG 4 DAYS A WEEK WITH 5 MG FOR 3 DAYS A WEEK FOLLOW UP FOR INR IN 1 WEEK'S TIME    Much of this encounter note is an electronic transcription/translation of spoken language to printed text.  The electronic translation of spoken language may permit erroneous, or at times, nonsensical words or phrases to be inadvertently transcribed.  Although I have reviewed the note for such errors, some may still exist.

## 2017-08-29 ENCOUNTER — OFFICE VISIT (OUTPATIENT)
Dept: FAMILY MEDICINE CLINIC | Facility: CLINIC | Age: 72
End: 2017-08-29

## 2017-08-29 VITALS
OXYGEN SATURATION: 96 % | BODY MASS INDEX: 21.94 KG/M2 | HEART RATE: 64 BPM | HEIGHT: 72 IN | WEIGHT: 162 LBS | TEMPERATURE: 98.1 F | SYSTOLIC BLOOD PRESSURE: 160 MMHG | DIASTOLIC BLOOD PRESSURE: 88 MMHG

## 2017-08-29 DIAGNOSIS — I82.599 CHRONIC DEEP VEIN THROMBOSIS (DVT) OF OTHER VEIN OF LOWER EXTREMITY: ICD-10-CM

## 2017-08-29 DIAGNOSIS — Z79.01 LONG TERM CURRENT USE OF ANTICOAGULANTS WITH INR GOAL OF 2.0-3.0: Primary | ICD-10-CM

## 2017-08-29 LAB — INR PPP: 2 (ref 0.9–1.1)

## 2017-08-29 PROCEDURE — 85610 PROTHROMBIN TIME: CPT | Performed by: INTERNAL MEDICINE

## 2017-08-29 PROCEDURE — 36416 COLLJ CAPILLARY BLOOD SPEC: CPT | Performed by: INTERNAL MEDICINE

## 2017-08-29 PROCEDURE — 99213 OFFICE O/P EST LOW 20 MIN: CPT | Performed by: INTERNAL MEDICINE

## 2017-08-29 NOTE — PROGRESS NOTES
Subjective   Narciso Romano is a 71 y.o. male.   Has DVT anticoagulation with Coumadin for that slightly low last visit with adjustment on his Coumadin  History of Present Illness       Coumadin dose is 5 mg 4 days a week alternating with 2.5 3 days a week.  Has avoided any dietary indiscretions on this for DVT  Review of Systems   All other systems reviewed and are negative.      Objective   Vitals:    08/29/17 0755   BP: 160/88   Pulse: 64   Temp: 98.1 °F (36.7 °C)   SpO2: 96%   Weight: 162 lb (73.5 kg)     Physical Exam   Constitutional: He appears well-developed and well-nourished.   HENT:   Head: Normocephalic and atraumatic.   Cardiovascular: Normal rate, regular rhythm and normal heart sounds.    Pulmonary/Chest: Effort normal and breath sounds normal.   Nursing note and vitals reviewed.      Lab Results   Component Value Date    INR 2.00 (A) 08/29/2017    INR 1.90 (A) 08/22/2017    INR 1.70 (A) 08/15/2017       Procedures    Assessment/Plan 1 DVT    2.  Anticoagulation with Coumadin target range 2.0-3.0 plan Coumadin 5 mg 4 days alternating with 2.5 mg 3 days a week  inr 1 mo    Much of this encounter note is an electronic transcription/translation of spoken language to printed text.  The electronic translation of spoken language may permit erroneous, or at times, nonsensical words or phrases to be inadvertently transcribed.  Although I have reviewed the note for such errors, some may still exist.

## 2017-09-26 ENCOUNTER — OFFICE VISIT (OUTPATIENT)
Dept: FAMILY MEDICINE CLINIC | Facility: CLINIC | Age: 72
End: 2017-09-26

## 2017-09-26 VITALS
SYSTOLIC BLOOD PRESSURE: 158 MMHG | OXYGEN SATURATION: 95 % | TEMPERATURE: 97.8 F | BODY MASS INDEX: 21.7 KG/M2 | WEIGHT: 160.2 LBS | HEART RATE: 72 BPM | HEIGHT: 72 IN | DIASTOLIC BLOOD PRESSURE: 90 MMHG

## 2017-09-26 DIAGNOSIS — I10 HYPERTENSION, ESSENTIAL: ICD-10-CM

## 2017-09-26 DIAGNOSIS — I82.599 CHRONIC DEEP VEIN THROMBOSIS (DVT) OF OTHER VEIN OF LOWER EXTREMITY: ICD-10-CM

## 2017-09-26 DIAGNOSIS — Z79.01 LONG TERM CURRENT USE OF ANTICOAGULANTS WITH INR GOAL OF 2.0-3.0: Primary | ICD-10-CM

## 2017-09-26 LAB — INR PPP: 2.2 (ref 0.9–1.1)

## 2017-09-26 PROCEDURE — G0008 ADMIN INFLUENZA VIRUS VAC: HCPCS | Performed by: INTERNAL MEDICINE

## 2017-09-26 PROCEDURE — 85610 PROTHROMBIN TIME: CPT | Performed by: INTERNAL MEDICINE

## 2017-09-26 PROCEDURE — 99213 OFFICE O/P EST LOW 20 MIN: CPT | Performed by: INTERNAL MEDICINE

## 2017-09-26 PROCEDURE — 36416 COLLJ CAPILLARY BLOOD SPEC: CPT | Performed by: INTERNAL MEDICINE

## 2017-09-26 RX ORDER — MONTELUKAST SODIUM 10 MG/1
10 TABLET ORAL NIGHTLY
Qty: 30 TABLET | Refills: 1 | Status: SHIPPED | OUTPATIENT
Start: 2017-09-26 | End: 2017-10-24 | Stop reason: SDUPTHER

## 2017-09-26 NOTE — PROGRESS NOTES
Subjective   Narciso Romano is a 71 y.o. male.   Patient with DVT long-term Coumadin anticoagulation  History of Present Illness   5/5/2.5 his three-day cycle blood pressure be elevated today patient is not had his lipid pressure medicines yet we did a recheck of her still elevated he will take his medicines and check his blood pressures regularly and call us with some readings in 3 days time i.e. Thursday.  No complaints overall doing well no cardiac symptoms.    Review of Systems   All other systems reviewed and are negative.      Objective   Vitals:    09/26/17 0756   BP: 160/92   Pulse: 72   Temp: 97.8 °F (36.6 °C)   SpO2: 95%   Weight: 160 lb 3.2 oz (72.7 kg)     Physical Exam   Constitutional: He appears well-developed and well-nourished.   HENT:   Head: Normocephalic and atraumatic.   Cardiovascular: Normal rate, regular rhythm and normal heart sounds.    Pulmonary/Chest: Effort normal and breath sounds normal.   Nursing note and vitals reviewed.      Lab Results   Component Value Date    INR 2.20 (A) 09/26/2017    INR 2.00 (A) 08/29/2017    INR 1.90 (A) 08/22/2017       Procedures    Assessment/Plan 1.  DVT    2.  Anticoagulation with Coumadin long-term continue 5/5/2.5 cycle repeat INR in 1 month    3.  Hypertension elevated today plan as described take medicines monitor blood pressure next few days and call in readings Thursday.  This is Tuesday    No bp meds yet today pt to take and call in bp readings thurs        Much of this encounter note is an electronic transcription/translation of spoken language to printed text.  The electronic translation of spoken language may permit erroneous, or at times, nonsensical words or phrases to be inadvertently transcribed.  Although I have reviewed the note for such errors, some may still exist.

## 2017-10-09 ENCOUNTER — TELEPHONE (OUTPATIENT)
Dept: FAMILY MEDICINE CLINIC | Facility: CLINIC | Age: 72
End: 2017-10-09

## 2017-10-09 ENCOUNTER — OFFICE VISIT (OUTPATIENT)
Dept: FAMILY MEDICINE CLINIC | Facility: CLINIC | Age: 72
End: 2017-10-09

## 2017-10-09 VITALS
TEMPERATURE: 97.4 F | WEIGHT: 155.2 LBS | BODY MASS INDEX: 21.02 KG/M2 | SYSTOLIC BLOOD PRESSURE: 140 MMHG | HEART RATE: 85 BPM | HEIGHT: 72 IN | DIASTOLIC BLOOD PRESSURE: 78 MMHG | OXYGEN SATURATION: 93 %

## 2017-10-09 DIAGNOSIS — M79.10 MYALGIA: Primary | ICD-10-CM

## 2017-10-09 DIAGNOSIS — R07.1 CHEST PAIN ON BREATHING: ICD-10-CM

## 2017-10-09 DIAGNOSIS — J40 BRONCHITIS: ICD-10-CM

## 2017-10-09 LAB
ERYTHROCYTE [DISTWIDTH] IN BLOOD BY AUTOMATED COUNT: 16.2 % (ref 4.5–15)
FLUAV AG NPH QL: NEGATIVE
FLUBV AG NPH QL IA: NEGATIVE
HCT VFR BLD AUTO: 39.8 % (ref 35–60)
HGB BLD-MCNC: 13.1 G/DL (ref 13.5–18)
LYMPHOCYTES # BLD AUTO: 1.3 10*3/MM3 (ref 1.2–3.4)
LYMPHOCYTES NFR BLD AUTO: 20.3 % (ref 21–51)
MCH RBC QN AUTO: 28.4 PG (ref 26.1–33.1)
MCHC RBC AUTO-ENTMCNC: 33 G/DL (ref 33–37)
MCV RBC AUTO: 86.2 FL (ref 80–99)
MONOCYTES # BLD AUTO: 0.1 10*3/MM3 (ref 0.1–0.6)
MONOCYTES NFR BLD AUTO: 1.5 % (ref 2–9)
NEUTROPHILS # BLD AUTO: 5.1 10*3/MM3 (ref 1.4–6.5)
NEUTROPHILS NFR BLD AUTO: 78.2 % (ref 42–75)
PLATELET # BLD AUTO: 259 10*3/MM3 (ref 150–450)
PMV BLD AUTO: 7.9 FL (ref 7.1–10.5)
RBC # BLD AUTO: 4.62 10*6/MM3 (ref 4–6)
WBC NRBC COR # BLD: 6.5 10*3/MM3 (ref 4.5–10)

## 2017-10-09 PROCEDURE — 87804 INFLUENZA ASSAY W/OPTIC: CPT | Performed by: INTERNAL MEDICINE

## 2017-10-09 PROCEDURE — 71020 XR CHEST PA AND LATERAL: CPT | Performed by: INTERNAL MEDICINE

## 2017-10-09 PROCEDURE — 99213 OFFICE O/P EST LOW 20 MIN: CPT | Performed by: INTERNAL MEDICINE

## 2017-10-09 PROCEDURE — 85025 COMPLETE CBC W/AUTO DIFF WBC: CPT | Performed by: INTERNAL MEDICINE

## 2017-10-09 PROCEDURE — 36415 COLL VENOUS BLD VENIPUNCTURE: CPT | Performed by: INTERNAL MEDICINE

## 2017-10-09 RX ORDER — CLINDAMYCIN HYDROCHLORIDE 300 MG/1
300 CAPSULE ORAL 3 TIMES DAILY
Qty: 30 CAPSULE | Refills: 0 | Status: SHIPPED | OUTPATIENT
Start: 2017-10-09 | End: 2017-10-24

## 2017-10-09 NOTE — PROGRESS NOTES
Subjective   Narciso Romano is a 71 y.o. male.   Head and chest congestion  History of Present Illness   Feeling tired some myalgias also head congestion and chest congestion with minimal coughing no significant sputum noted patient does have a history of pneumonia.  Overall decreased energy    Review of Systems   Constitutional: Positive for chills, fatigue and fever.   HENT: Positive for congestion.    Respiratory: Positive for cough.        Objective   Vitals:    10/09/17 1033   BP: 140/78   Pulse: 85   Temp: 97.4 °F (36.3 °C)   SpO2: 93%   Weight: 155 lb 3.2 oz (70.4 kg)     Physical Exam   Constitutional: He appears well-developed and well-nourished.   HENT:   Right Ear: External ear normal.   Left Ear: External ear normal.   Mouth/Throat: Oropharynx is clear and moist.   Eyes: EOM are normal. Pupils are equal, round, and reactive to light.   Cardiovascular: Normal rate, regular rhythm and normal heart sounds.    Pulmonary/Chest: Effort normal and breath sounds normal.       Lab Results   Component Value Date    INR 2.20 (A) 09/26/2017    INR 2.00 (A) 08/29/2017    INR 1.90 (A) 08/22/2017       Procedures  Chest x-ray PA and lateral obtained.  Comparison from 10/25/16 available.  No acute bony or soft tissue abnormalities are noted no active parenchymal infiltrates seen pacemaker present.  Assessment/Plan   1.  Myalgias flu test is negative plan observation    2.  Bronchitis plan clindamycin 300 3 times a day ×10 days' time  also need to follow up in a few days time for INR since patient on clindamycin  3.  Pleuritic chest pain we'll treat bronchitis with expectations resolution.  Follow-up not well in 10 days' time and as needed.    Much of this encounter note is an electronic transcription/translation of spoken language to printed text.  The electronic translation of spoken language may permit erroneous, or at times, nonsensical words or phrases to be inadvertently transcribed.  Although I have reviewed the  note for such errors, some may still exist.

## 2017-10-24 ENCOUNTER — OFFICE VISIT (OUTPATIENT)
Dept: FAMILY MEDICINE CLINIC | Facility: CLINIC | Age: 72
End: 2017-10-24

## 2017-10-24 VITALS
HEIGHT: 72 IN | DIASTOLIC BLOOD PRESSURE: 88 MMHG | WEIGHT: 158.4 LBS | OXYGEN SATURATION: 93 % | BODY MASS INDEX: 21.45 KG/M2 | TEMPERATURE: 97.8 F | SYSTOLIC BLOOD PRESSURE: 138 MMHG | HEART RATE: 78 BPM

## 2017-10-24 DIAGNOSIS — I82.592 CHRONIC DEEP VEIN THROMBOSIS (DVT) OF OTHER VEIN OF LEFT LOWER EXTREMITY (HCC): ICD-10-CM

## 2017-10-24 LAB — INR PPP: 2 (ref 0.9–1.1)

## 2017-10-24 PROCEDURE — 36416 COLLJ CAPILLARY BLOOD SPEC: CPT | Performed by: FAMILY MEDICINE

## 2017-10-24 PROCEDURE — 99212 OFFICE O/P EST SF 10 MIN: CPT | Performed by: FAMILY MEDICINE

## 2017-10-24 PROCEDURE — 85610 PROTHROMBIN TIME: CPT | Performed by: FAMILY MEDICINE

## 2017-10-24 NOTE — PROGRESS NOTES
SUBJECTIVE:  The patient is  a 71-year-old Afro-American male comes in for follow-up.  He recently respiratory infection and is 80-90% better after being treated.  He is currently on 5 mg of Coumadin for 2 days followed by 2.5 mg.  His INR is 2.0.    PAST MEDICAL HISTORY:  Reviewed.    REVIEW OF SYSTEMS:  Please see above; 14 point ROS otherwise negative.      OBJECTIVE: Vitals signs are reviewed and are stable.    HEENT: PERRLA.    Neck:  Supple.    Lungs:  Clear.    Heart:  Regular rate and rhythm.    Abdomen:   Soft, nontender.    Extremities:  No cyanosis, clubbing or edema.      ASSESSMENT:    DVT  Recent respiratory infection-improved    PLAN:  Continue same dose of Coumadin.  Recheck in 1 month.    Much of this encounter note is an electronic transcription/translation of spoken language to printed text.  The electronic translation of spoken language may permit erroneous, or at times, nonsensical words or phrases to be inadvertently transcribed.  Although I have reviewed the note for such errors, some may still exist.

## 2017-11-21 ENCOUNTER — OFFICE VISIT (OUTPATIENT)
Dept: FAMILY MEDICINE CLINIC | Facility: CLINIC | Age: 72
End: 2017-11-21

## 2017-11-21 VITALS
HEIGHT: 72 IN | BODY MASS INDEX: 21.64 KG/M2 | HEART RATE: 75 BPM | OXYGEN SATURATION: 92 % | WEIGHT: 159.8 LBS | TEMPERATURE: 97.9 F | SYSTOLIC BLOOD PRESSURE: 148 MMHG | DIASTOLIC BLOOD PRESSURE: 90 MMHG

## 2017-11-21 DIAGNOSIS — Z00.00 ENCOUNTER FOR MEDICARE ANNUAL WELLNESS EXAM: Primary | ICD-10-CM

## 2017-11-21 DIAGNOSIS — Z79.01 LONG TERM CURRENT USE OF ANTICOAGULANTS WITH INR GOAL OF 2.0-3.0: ICD-10-CM

## 2017-11-21 DIAGNOSIS — I82.592 CHRONIC DEEP VEIN THROMBOSIS (DVT) OF OTHER VEIN OF LEFT LOWER EXTREMITY (HCC): ICD-10-CM

## 2017-11-21 DIAGNOSIS — J40 BRONCHITIS: ICD-10-CM

## 2017-11-21 LAB — INR PPP: 2.6 (ref 0.9–1.1)

## 2017-11-21 PROCEDURE — G0009 ADMIN PNEUMOCOCCAL VACCINE: HCPCS | Performed by: INTERNAL MEDICINE

## 2017-11-21 PROCEDURE — 99213 OFFICE O/P EST LOW 20 MIN: CPT | Performed by: INTERNAL MEDICINE

## 2017-11-21 PROCEDURE — 96160 PT-FOCUSED HLTH RISK ASSMT: CPT | Performed by: INTERNAL MEDICINE

## 2017-11-21 PROCEDURE — G0438 PPPS, INITIAL VISIT: HCPCS | Performed by: INTERNAL MEDICINE

## 2017-11-21 PROCEDURE — 36416 COLLJ CAPILLARY BLOOD SPEC: CPT | Performed by: INTERNAL MEDICINE

## 2017-11-21 PROCEDURE — 90670 PCV13 VACCINE IM: CPT | Performed by: INTERNAL MEDICINE

## 2017-11-21 PROCEDURE — 85610 PROTHROMBIN TIME: CPT | Performed by: INTERNAL MEDICINE

## 2017-11-21 RX ORDER — CLINDAMYCIN HYDROCHLORIDE 300 MG/1
300 CAPSULE ORAL 3 TIMES DAILY
Qty: 30 CAPSULE | Refills: 0 | Status: SHIPPED | OUTPATIENT
Start: 2017-11-21 | End: 2017-12-20

## 2017-11-21 NOTE — PATIENT INSTRUCTIONS
Medicare Wellness  Personal Prevention Plan of Service     Date of Office Visit:  2017  Encounter Provider:  Jacinto Black MD  Place of Service:  [facility]  Patient Name: Narciso Romano  :  1945    As part of the Medicare Wellness portion of your visit today, we are providing you with this personalized preventive plan of services (PPPS). This plan is based upon recommendations of the [place] Preventive Services Task Force (USPSTF) and the Advisory Committee on Immunization Practices (ACIP).    This lists the preventive care services that should be considered, and provides dates of when you are due. Items listed as completed are up-to-date and do not require any further intervention.    Health Maintenance   Topic Date Due   • TDAP/TD VACCINES (1 - Tdap) 1965   • HEPATITIS C SCREENING  2016   • MEDICARE ANNUAL WELLNESS  2016   • ZOSTER VACCINE  2016   • LIPID PANEL  2016   • PNEUMOCOCCAL VACCINES (65+ LOW/MEDIUM RISK) (2 of 2 - PPSV23) 2016   • COLONOSCOPY  2020   • INFLUENZA VACCINE  Completed   • AAA SCREEN (ONE-TIME)  Completed       Orders Placed This Encounter   Procedures   • POC INR     This is an external result entered through the Results Console.       No Follow-up on file.

## 2017-11-21 NOTE — PROGRESS NOTES
Subjective   Narciso Romano is a 71 y.o. male.   Encounter for Medicare wellness exam additionally needs follow-up on DVTs on long-term Coumadin also since some cough with sputum production were done early bronchitis.  History of Present Illness Medicare wellness exam  5/5/2.5 th.is is current three-day cycle of Coumadin for his DVT.  Some increased sputum production mild congestive lungs no increased temperature this point time thinks his conditions early but does have lung issues COPD and wants to catch this early.    Review of Systems   All other systems reviewed and are negative.      Objective   Vitals:    11/21/17 0752   BP: 148/90   Pulse: 75   Temp: 97.9 °F (36.6 °C)   SpO2: 92%   Weight: 159 lb 12.8 oz (72.5 kg)     Physical Exam   Constitutional: He appears well-developed and well-nourished.   HENT:   Head: Normocephalic and atraumatic.   Eyes: Conjunctivae are normal. Pupils are equal, round, and reactive to light.   Cardiovascular: Normal rate, regular rhythm and normal heart sounds.    Pulmonary/Chest: Effort normal and breath sounds normal.   No wheezes at present   Neurological: He is alert.   Unremarkable and stable gait   Skin: Skin is warm and dry.   Nursing note and vitals reviewed.      Lab Results   Component Value Date    INR 2.60 (A) 11/21/2017    INR 2.00 (A) 10/24/2017    INR 2.20 (A) 09/26/2017       Procedures    Assessment/Plan   1.  Medicare wellness exam, initial    2.  Bronchitis plan clindamycin 300 3 times a day ×10 days per his description is not altered his INR significantly follow-up of not well in 10 days' time or graft     3.  DVT    4.  Anticoagulation with Coumadin target range 2.0-3.0 plan continue present 5/5/2.5 cycle with follow-up INR in 1 month's time    Much of this encounter note is an electronic transcription/translation of spoken language to printed text.  The electronic translation of spoken language may permit erroneous, or at times, nonsensical words or phrases  to be inadvertently transcribed.  Although I have reviewed the note for such errors, some may still exist.

## 2017-11-21 NOTE — PROGRESS NOTES
QUICK REFERENCE INFORMATION:  The ABCs of the Annual Wellness Visit    Initial Medicare Wellness Visit    HEALTH RISK ASSESSMENT    1945    Recent Hospitalizations:  No hospitalization(s) within the last year..        Current Medical Providers:  Patient Care Team:  Jacinto Black Jr., MD as PCP - General  Jacinto Black Jr., MD as PCP - Family Medicine        Smoking Status:  History   Smoking Status   • Former Smoker   • Packs/day: 1.00   • Years: 30.00   Smokeless Tobacco   • Never Used       Alcohol Consumption:  History   Alcohol Use No       Depression Screen:   PHQ-2/PHQ-9 Depression Screening 11/21/2017   Little interest or pleasure in doing things 0   Feeling down, depressed, or hopeless 0   Trouble falling or staying asleep, or sleeping too much 0   Feeling tired or having little energy 0   Poor appetite or overeating 0   Feeling bad about yourself - or that you are a failure or have let yourself or your family down 0   Trouble concentrating on things, such as reading the newspaper or watching television 0   Moving or speaking so slowly that other people could have noticed. Or the opposite - being so fidgety or restless that you have been moving around a lot more than usual 0   Thoughts that you would be better off dead, or of hurting yourself in some way 0   Total Score 0   If you checked off any problems, how difficult have these problems made it for you to do your work, take care of things at home, or get along with other people? Not difficult at all       Health Habits and Functional and Cognitive Screening:  Functional & Cognitive Status 11/21/2017   Do you have difficulty preparing food and eating? No   Do you have difficulty bathing yourself, getting dressed or grooming yourself? No   Do you have difficulty using the toilet? No   Do you have difficulty moving around from place to place? No   Do you have trouble with steps or getting out of a bed or a chair? No   In the past year have you  fallen or experienced a near fall? Yes   Current Diet Well Balanced Diet   Dental Exam Up to date   Eye Exam Up to date   Exercise (times per week) 2 times per week   Current Exercise Activities Include Walking   Do you need help using the phone?  No   Are you deaf or do you have serious difficulty hearing?  No   Do you need help with transportation? No   Do you need help shopping? No   Do you need help preparing meals?  No   Do you need help with housework?  No   Do you need help with laundry? No   Do you need help taking your medications? No   Do you need help managing money? No   Have you felt unusual stress, anger or loneliness in the last month? No   Who do you live with? Spouse   If you need help, do you have trouble finding someone available to you? No   Have you been bothered in the last four weeks by sexual problems? No   Do you have difficulty concentrating, remembering or making decisions? No           Does the patient have evidence of cognitive impairment? No    Asiprin use counseling: Start ASA 81 mg daily       Recent Lab Results:    Visual Acuity:  No exam data present    Age-appropriate Screening Schedule:  Refer to the list below for future screening recommendations based on patient's age, sex and/or medical conditions. Orders for these recommended tests are listed in the plan section. The patient has been provided with a written plan.    Health Maintenance   Topic Date Due   • TDAP/TD VACCINES (1 - Tdap) 11/22/1965   • ZOSTER VACCINE  03/23/2016   • LIPID PANEL  07/28/2016   • PNEUMOCOCCAL VACCINES (65+ LOW/MEDIUM RISK) (2 of 2 - PPSV23) 11/17/2016   • COLONOSCOPY  01/01/2020   • INFLUENZA VACCINE  Completed        Subjective   History of Present Illness    Narciso Romano is a 71 y.o. male who presents for an Annual Wellness Visit.    The following portions of the patient's history were reviewed and updated as appropriate: allergies, current medications, past family history, past medical history,  past social history, past surgical history and problem list.    Outpatient Medications Prior to Visit   Medication Sig Dispense Refill   • acyclovir (ZOVIRAX) 800 MG tablet TAKE 5 TABLETS BY MOUTH EVERY DAY 35 tablet 2   • albuterol (PROVENTIL HFA;VENTOLIN HFA) 108 (90 BASE) MCG/ACT inhaler Inhale 2 puffs every 4 (four) hours as needed for wheezing.     • aspirin 81 MG EC tablet Take 81 mg by mouth daily.     • finasteride (PROSCAR) 5 MG tablet Take 5 mg by mouth daily.     • fluticasone (FLONASE) 50 MCG/ACT nasal spray 2 sprays into each nostril Daily. 2 SPRAYS IN EACH NOSTRIL DAILY 1 each 11   • Fluticasone Furoate-Vilanterol (BREO ELLIPTA) 100-25 MCG/INH aerosol powder  Inhale 1 puff Daily. 1 each 0   • hydrALAZINE (APRESOLINE) 100 MG tablet TAKE 1 TABLET 3 TIMES DAILY 270 tablet 0   • isosorbide dinitrate (ISORDIL) 20 MG tablet TAKE 1 TABLET THREE TIMES A DAY 90 tablet 3   • levothyroxine (SYNTHROID) 100 MCG tablet Take 1 tablet by mouth Daily. 30 tablet 1   • metoprolol succinate XL (TOPROL-XL) 25 MG 24 hr tablet TAKE 1 TABLET TWICE A DAY  11   • montelukast (SINGULAIR) 10 MG tablet TAKE 1 TABLET BY MOUTH EVERY NIGHT. 30 tablet 5   • NIFEdipine CC (ADALAT CC) 90 MG 24 hr tablet Take 1 tablet by mouth Daily. 90 tablet 3   • nitroglycerin (NITROSTAT) 0.4 MG SL tablet Place 1 tablet under the tongue Every 5 (Five) Minutes As Needed for Chest Pain. Take no more than 3 doses in 15 minutes. 25 tablet 5   • pantoprazole (PROTONIX) 40 MG EC tablet Take 40 mg by mouth daily.     • sildenafil (REVATIO) 20 MG tablet Take 1 tablet by mouth Daily. 10 tablet 3   • tamsulosin (FLOMAX) 0.4 MG capsule 24 hr capsule TAKE ONE CAPSULE EVERY DAY 90 capsule 3   • warfarin (COUMADIN) 5 MG tablet TAKE 2 TABLETS EVERY DAY 60 tablet 4     No facility-administered medications prior to visit.        Patient Active Problem List   Diagnosis   • DVT (deep venous thrombosis)   • Long term current use of anticoagulants with INR goal of 2.0-3.0  "  • Essential hypertension   • Postoperative hypothyroidism   • BPH (benign prostatic hyperplasia)   • Hyperlipidemia   • GERD (gastroesophageal reflux disease)   • COPD (chronic obstructive pulmonary disease)   • Renal disease   • Myocardial infarction       Advance Care Planning:  has NO advance directive - not interested in additional information    Identification of Risk Factors:  Risk factors include: n/a.    Review of Systems    Compared to one year ago, the patient feels his physical health is better.  Compared to one year ago, the patient feels his mental health is the same.    Objective     Physical Exam    Vitals:    11/21/17 0752   BP: 148/90   BP Location: Left arm   Patient Position: Sitting   Cuff Size: Large Adult   Pulse: 75   Temp: 97.9 °F (36.6 °C)   TempSrc: Oral   SpO2: 92%   Weight: 159 lb 12.8 oz (72.5 kg)   Height: 72\" (182.9 cm)   PainSc: 0-No pain       Body mass index is 21.67 kg/(m^2).  Discussed the patient's BMI with him. The BMI is in the acceptable range.    Assessment/Plan   Patient Self-Management and Personalized Health Advice  The patient has been provided with information about: diet and preventive services including:   · Fall Risk assessment done.    Visit Diagnoses:    ICD-10-CM ICD-9-CM   1. Chronic deep vein thrombosis (DVT) of other vein of left lower extremity I82.592 453.50       Orders Placed This Encounter   Procedures   • POC INR     This is an external result entered through the Results Console.       Outpatient Encounter Prescriptions as of 11/21/2017   Medication Sig Dispense Refill   • acyclovir (ZOVIRAX) 800 MG tablet TAKE 5 TABLETS BY MOUTH EVERY DAY 35 tablet 2   • albuterol (PROVENTIL HFA;VENTOLIN HFA) 108 (90 BASE) MCG/ACT inhaler Inhale 2 puffs every 4 (four) hours as needed for wheezing.     • aspirin 81 MG EC tablet Take 81 mg by mouth daily.     • finasteride (PROSCAR) 5 MG tablet Take 5 mg by mouth daily.     • fluticasone (FLONASE) 50 MCG/ACT nasal spray 2 " sprays into each nostril Daily. 2 SPRAYS IN EACH NOSTRIL DAILY 1 each 11   • Fluticasone Furoate-Vilanterol (BREO ELLIPTA) 100-25 MCG/INH aerosol powder  Inhale 1 puff Daily. 1 each 0   • hydrALAZINE (APRESOLINE) 100 MG tablet TAKE 1 TABLET 3 TIMES DAILY 270 tablet 0   • isosorbide dinitrate (ISORDIL) 20 MG tablet TAKE 1 TABLET THREE TIMES A DAY 90 tablet 3   • levothyroxine (SYNTHROID) 100 MCG tablet Take 1 tablet by mouth Daily. 30 tablet 1   • metoprolol succinate XL (TOPROL-XL) 25 MG 24 hr tablet TAKE 1 TABLET TWICE A DAY  11   • montelukast (SINGULAIR) 10 MG tablet TAKE 1 TABLET BY MOUTH EVERY NIGHT. 30 tablet 5   • NIFEdipine CC (ADALAT CC) 90 MG 24 hr tablet Take 1 tablet by mouth Daily. 90 tablet 3   • nitroglycerin (NITROSTAT) 0.4 MG SL tablet Place 1 tablet under the tongue Every 5 (Five) Minutes As Needed for Chest Pain. Take no more than 3 doses in 15 minutes. 25 tablet 5   • pantoprazole (PROTONIX) 40 MG EC tablet Take 40 mg by mouth daily.     • sildenafil (REVATIO) 20 MG tablet Take 1 tablet by mouth Daily. 10 tablet 3   • tamsulosin (FLOMAX) 0.4 MG capsule 24 hr capsule TAKE ONE CAPSULE EVERY DAY 90 capsule 3   • warfarin (COUMADIN) 5 MG tablet TAKE 2 TABLETS EVERY DAY 60 tablet 4     No facility-administered encounter medications on file as of 11/21/2017.        Reviewed use of high risk medication in the elderly: yes  Reviewed for potential of harmful drug interactions in the elderly: yes    Follow Up:  No Follow-up on file.     An After Visit Summary and PPPS with all of these plans were given to the patient.

## 2017-11-27 RX ORDER — HYDRALAZINE HYDROCHLORIDE 100 MG/1
TABLET, FILM COATED ORAL
Qty: 270 TABLET | Refills: 0 | Status: SHIPPED | OUTPATIENT
Start: 2017-11-27 | End: 2018-07-18 | Stop reason: SDUPTHER

## 2017-12-05 ENCOUNTER — TELEPHONE (OUTPATIENT)
Dept: FAMILY MEDICINE CLINIC | Facility: CLINIC | Age: 72
End: 2017-12-05

## 2017-12-05 DIAGNOSIS — M25.579 ANKLE PAIN, UNSPECIFIED CHRONICITY, UNSPECIFIED LATERALITY: Primary | ICD-10-CM

## 2017-12-05 NOTE — TELEPHONE ENCOUNTER
Okay we will do that but anymore specifics any sprain ankle chronic pain old injuries etc. to shape up the referral

## 2017-12-20 ENCOUNTER — TELEPHONE (OUTPATIENT)
Dept: FAMILY MEDICINE CLINIC | Facility: CLINIC | Age: 72
End: 2017-12-20

## 2017-12-20 ENCOUNTER — OFFICE VISIT (OUTPATIENT)
Dept: FAMILY MEDICINE CLINIC | Facility: CLINIC | Age: 72
End: 2017-12-20

## 2017-12-20 VITALS
OXYGEN SATURATION: 99 % | WEIGHT: 159 LBS | SYSTOLIC BLOOD PRESSURE: 140 MMHG | HEART RATE: 75 BPM | BODY MASS INDEX: 21.54 KG/M2 | TEMPERATURE: 97.9 F | DIASTOLIC BLOOD PRESSURE: 80 MMHG | HEIGHT: 72 IN | RESPIRATION RATE: 18 BRPM

## 2017-12-20 DIAGNOSIS — M25.572 LEFT ANKLE PAIN, UNSPECIFIED CHRONICITY: Primary | ICD-10-CM

## 2017-12-20 DIAGNOSIS — J20.9 ACUTE BRONCHITIS, UNSPECIFIED ORGANISM: ICD-10-CM

## 2017-12-20 DIAGNOSIS — I82.502 CHRONIC DEEP VEIN THROMBOSIS (DVT) OF LEFT LOWER EXTREMITY, UNSPECIFIED VEIN (HCC): ICD-10-CM

## 2017-12-20 LAB — INR PPP: 1.6 (ref 0.9–1.1)

## 2017-12-20 PROCEDURE — 99214 OFFICE O/P EST MOD 30 MIN: CPT | Performed by: FAMILY MEDICINE

## 2017-12-20 PROCEDURE — 36416 COLLJ CAPILLARY BLOOD SPEC: CPT | Performed by: FAMILY MEDICINE

## 2017-12-20 PROCEDURE — 73610 X-RAY EXAM OF ANKLE: CPT | Performed by: FAMILY MEDICINE

## 2017-12-20 PROCEDURE — 85610 PROTHROMBIN TIME: CPT | Performed by: FAMILY MEDICINE

## 2017-12-20 NOTE — PROGRESS NOTES
SUBJECTIVE:  The patient is a 72-year-old -American males here for several reasons.  He has a history of DVT and is here for [facility] INR.  His current dose of Coumadin is 5 for 2 days then 2.575 then 2.5 and 5 and 2.5 and then 5 mg for 2 days.  His INR is 1.6.  She needs had a productive cough of green mucus for 5 days.  Unaware fever or chills.  Also he fell from a ladder 6 weeks ago and injured his left ankle.  It still hurts.     PAST MEDICAL HISTORY:  Reviewed.    REVIEW OF SYSTEMS:  Please see above; 14 point ROS otherwise negative.      OBJECTIVE: Vitals signs are reviewed and are stable.    HEENT: PERRLA.  []TMs okay.  Throat red.  Neck:  Supple.  []  Lungs:  Few scattered rhonchi noted.  No rales.  Rare wheeze noted..   Heart:  Regular rate and rhythm.  []  Abdomen:   Soft, nontender.  []  Extremities:  No cyanosis, clubbing or edema.  []Shavon is present on the medial aspect of the left ankle.  Full range of motion.  Neurovascular status intact.    Review x-ray of the left ankle was done here and interpreted by me.  No old x-rays available for comparison.  Indication fall with left ankle pain.  X-ray shows no acute abnormalities.    ASSESSMENT:    []History of DVT  Left ankle pain  Acute bronchitis  Bronchospasm    PLAN:  []He'll take 5 mg of Coumadin for 2 days then 2.5 mg.  He will follow-up in one week.  He will be referred to an orthopedist for his ankle pain.  He is prescribed amoxicillin 875 twice a day.  He's advised to take Mucinex.  He will follow up if not better in a couple days.  He will notify me if problems.

## 2017-12-21 RX ORDER — AMOXICILLIN 875 MG/1
875 TABLET, COATED ORAL EVERY 12 HOURS SCHEDULED
Qty: 20 TABLET | Refills: 0 | Status: SHIPPED | OUTPATIENT
Start: 2017-12-21 | End: 2018-01-19

## 2017-12-26 ENCOUNTER — OFFICE VISIT (OUTPATIENT)
Dept: FAMILY MEDICINE CLINIC | Facility: CLINIC | Age: 72
End: 2017-12-26

## 2017-12-26 VITALS
HEART RATE: 82 BPM | TEMPERATURE: 97.8 F | SYSTOLIC BLOOD PRESSURE: 160 MMHG | WEIGHT: 163 LBS | HEIGHT: 72 IN | BODY MASS INDEX: 22.08 KG/M2 | DIASTOLIC BLOOD PRESSURE: 82 MMHG | OXYGEN SATURATION: 95 %

## 2017-12-26 DIAGNOSIS — I82.4Z9 DEEP VEIN THROMBOSIS (DVT) OF DISTAL VEIN OF LOWER EXTREMITY, UNSPECIFIED CHRONICITY, UNSPECIFIED LATERALITY (HCC): ICD-10-CM

## 2017-12-26 DIAGNOSIS — Z79.01 LONG TERM CURRENT USE OF ANTICOAGULANTS WITH INR GOAL OF 2.0-3.0: ICD-10-CM

## 2017-12-26 DIAGNOSIS — J20.9 BRONCHITIS WITH BRONCHOSPASM: Primary | ICD-10-CM

## 2017-12-26 LAB — INR PPP: 1.8 (ref 0.9–1.1)

## 2017-12-26 PROCEDURE — 36416 COLLJ CAPILLARY BLOOD SPEC: CPT | Performed by: INTERNAL MEDICINE

## 2017-12-26 PROCEDURE — 99213 OFFICE O/P EST LOW 20 MIN: CPT | Performed by: INTERNAL MEDICINE

## 2017-12-26 PROCEDURE — 85610 PROTHROMBIN TIME: CPT | Performed by: INTERNAL MEDICINE

## 2017-12-26 RX ORDER — PREDNISONE 10 MG/1
10 TABLET ORAL DAILY
Qty: 20 TABLET | Refills: 0 | Status: SHIPPED | OUTPATIENT
Start: 2017-12-26 | End: 2018-09-06

## 2017-12-26 RX ORDER — DOXYCYCLINE HYCLATE 100 MG/1
100 CAPSULE ORAL 2 TIMES DAILY
Qty: 20 CAPSULE | Refills: 0 | Status: SHIPPED | OUTPATIENT
Start: 2017-12-26 | End: 2018-01-19

## 2017-12-26 NOTE — PROGRESS NOTES
Subjective   Narciso Romano is a 72 y.o. male.   Some wheezing going on still bringing up sputum somewhat better with amoxicillin but bronchitis is not resolved.  Also sees follow-up on his Coumadin he is on a 5/5/2.5 regimen.  No dietary indiscretions  History of Present Illness   Regarding bronchitis no reported temperature with this.  Is marginally better but far from well.  Can't take prednisone we will switch antibiotics regarding Coumadin which he takes for DVT INR complex slightly low at 1.6 all have him continue present dose but we are going add doxycycline with follow-up INR Friday.    Review of Systems   HENT: Positive for sneezing.    Respiratory: Positive for cough.    All other systems reviewed and are negative.      Objective   Vitals:    12/26/17 0827   BP: 160/82   Pulse: 82   Temp: 97.8 °F (36.6 °C)   SpO2: 95%   Weight: 73.9 kg (163 lb)     Physical Exam   Constitutional: He appears well-developed and well-nourished.   HENT:   Head: Normocephalic and atraumatic.   Eyes: Conjunctivae are normal. Pupils are equal, round, and reactive to light.   Cardiovascular: Normal rate, regular rhythm and normal heart sounds.    Pulmonary/Chest: Effort normal.   Rare wheeze heard   Skin: Skin is warm and dry.   Nursing note and vitals reviewed.      Lab Results   Component Value Date    INR 1.60 (A) 12/20/2017    INR 2.60 (A) 11/21/2017    INR 2.00 (A) 10/24/2017       Procedures    Assessment/Plan 1.  Bronchitis with bronchospasm plan discontinue amoxicillin.  Begin doxycycline hyclate 100 mg twice a day ×10 days' time.Prednisone 40 mg ×2 days, 30 mg ×2 days, 20 mg ×2 days, 10 mg ×2 days.    2.  DVT    3.  Anticoagulation with Coumadin target range 2.0-3.0 plan continue patient's 5/5/2.5 mg cycle repeat INR Friday which is 4 days from now    Much of this encounter note is an electronic transcription/translation of spoken language to printed text.  The electronic translation of spoken language may permit  erroneous, or at times, nonsensical words or phrases to be inadvertently transcribed.  Although I have reviewed the note for such errors, some may still exist.    Cont 5/5.2.5 Cycl  In r fri

## 2017-12-29 ENCOUNTER — OFFICE VISIT (OUTPATIENT)
Dept: FAMILY MEDICINE CLINIC | Facility: CLINIC | Age: 72
End: 2017-12-29

## 2017-12-29 VITALS
SYSTOLIC BLOOD PRESSURE: 160 MMHG | OXYGEN SATURATION: 93 % | TEMPERATURE: 97.9 F | HEIGHT: 72 IN | WEIGHT: 162 LBS | HEART RATE: 92 BPM | DIASTOLIC BLOOD PRESSURE: 90 MMHG | BODY MASS INDEX: 21.94 KG/M2

## 2017-12-29 DIAGNOSIS — Z79.01 LONG TERM CURRENT USE OF ANTICOAGULANTS WITH INR GOAL OF 2.0-3.0: ICD-10-CM

## 2017-12-29 DIAGNOSIS — I82.4Z2 DEEP VEIN THROMBOSIS (DVT) OF DISTAL VEIN OF LEFT LOWER EXTREMITY, UNSPECIFIED CHRONICITY (HCC): Primary | ICD-10-CM

## 2017-12-29 DIAGNOSIS — I10 HYPERTENSION, ESSENTIAL: ICD-10-CM

## 2017-12-29 LAB — INR PPP: 1.9 (ref 0.9–1.1)

## 2017-12-29 PROCEDURE — 99213 OFFICE O/P EST LOW 20 MIN: CPT | Performed by: INTERNAL MEDICINE

## 2017-12-29 PROCEDURE — 36416 COLLJ CAPILLARY BLOOD SPEC: CPT | Performed by: INTERNAL MEDICINE

## 2017-12-29 PROCEDURE — 85610 PROTHROMBIN TIME: CPT | Performed by: INTERNAL MEDICINE

## 2017-12-29 NOTE — PROGRESS NOTES
Subjective   Narciso Romano is a 72 y.o. male.   DVT, anticoagulation with Coumadin  History of Present Illness   5/5/5/2.5° current dose.  INR is been obtained today.      Review of Systems   All other systems reviewed and are negative.      Objective   Physical Exam   Constitutional: He appears well-developed and well-nourished.   HENT:   Head: Normocephalic and atraumatic.   Eyes: Conjunctivae are normal. Pupils are equal, round, and reactive to light.   Cardiovascular: Normal rate, regular rhythm and normal heart sounds.    Pulmonary/Chest: Effort normal and breath sounds normal.   Nursing note and vitals reviewed.      Assessment/Plan   .  1.  DVT    2.  Anticoagulation with Coumadin plan continue present Coumadin dose repeat INR in 1 week's time    3.  Hypertension mildly elevated plan increase metoprolol to 50 mg daily recheck on return in approximately 1-2 weeks time    Much of this encounter note is an electronic transcription/translation of spoken language to printed text.  The electronic translation of spoken language may permit erroneous, or at times, nonsensical words or phrases to be inadvertently transcribed.  Although I have reviewed the note for such errors, some may still exist.

## 2018-01-12 ENCOUNTER — OFFICE VISIT (OUTPATIENT)
Dept: FAMILY MEDICINE CLINIC | Facility: CLINIC | Age: 73
End: 2018-01-12

## 2018-01-12 VITALS
TEMPERATURE: 98 F | OXYGEN SATURATION: 95 % | BODY MASS INDEX: 22.21 KG/M2 | DIASTOLIC BLOOD PRESSURE: 82 MMHG | WEIGHT: 164 LBS | HEIGHT: 72 IN | HEART RATE: 74 BPM | SYSTOLIC BLOOD PRESSURE: 146 MMHG

## 2018-01-12 DIAGNOSIS — I10 HYPERTENSION, ESSENTIAL: ICD-10-CM

## 2018-01-12 DIAGNOSIS — I82.4Z2 DEEP VEIN THROMBOSIS (DVT) OF DISTAL VEIN OF LEFT LOWER EXTREMITY, UNSPECIFIED CHRONICITY (HCC): ICD-10-CM

## 2018-01-12 DIAGNOSIS — Z79.01 LONG TERM CURRENT USE OF ANTICOAGULANTS WITH INR GOAL OF 2.0-3.0: Primary | ICD-10-CM

## 2018-01-12 DIAGNOSIS — N18.30 CRD (CHRONIC RENAL DISEASE), STAGE III (HCC): ICD-10-CM

## 2018-01-12 LAB
ANION GAP SERPL CALCULATED.3IONS-SCNC: 9.4 MMOL/L
BUN BLD-MCNC: 26 MG/DL (ref 8–23)
BUN/CREAT SERPL: 12.6 (ref 7–25)
CALCIUM SPEC-SCNC: 10.5 MG/DL (ref 8.6–10.5)
CHLORIDE SERPL-SCNC: 104 MMOL/L (ref 98–107)
CO2 SERPL-SCNC: 26.6 MMOL/L (ref 22–29)
CREAT BLD-MCNC: 2.06 MG/DL (ref 0.76–1.27)
GFR SERPL CREATININE-BSD FRML MDRD: 39 ML/MIN/1.73
GLUCOSE BLD-MCNC: 81 MG/DL (ref 65–99)
INR PPP: 1.9 (ref 0.9–1.1)
POTASSIUM BLD-SCNC: 4.6 MMOL/L (ref 3.5–5.2)
SODIUM BLD-SCNC: 140 MMOL/L (ref 136–145)

## 2018-01-12 PROCEDURE — 36415 COLL VENOUS BLD VENIPUNCTURE: CPT | Performed by: INTERNAL MEDICINE

## 2018-01-12 PROCEDURE — 85610 PROTHROMBIN TIME: CPT | Performed by: INTERNAL MEDICINE

## 2018-01-12 PROCEDURE — 80048 BASIC METABOLIC PNL TOTAL CA: CPT | Performed by: INTERNAL MEDICINE

## 2018-01-12 PROCEDURE — 99213 OFFICE O/P EST LOW 20 MIN: CPT | Performed by: INTERNAL MEDICINE

## 2018-01-12 NOTE — PROGRESS NOTES
Subjective   Narciso Romano is a 72 y.o. male.   Patient with DVT long-term anticoagulation with Coumadin history of hypertension also.  Blood pressure check  History of Present Illness   As above medication monitoring with INR obtained today for his DVT comes in at 1.9 again    We are going to increase his Coumadin from 5/5/2.5 3 days cycle up to a 5/5/5/2.54 day cycle blood pressures would be high again today further adjust his blood pressure medicine.  We will increase his Toprol up to 25 mg 1.5 tablets twice a day from 1 tablet twice a day patient's monitor both his breathing and his pulse which currently 74.  Follow-up in one week on that.  Initially has chronic renal disease stage III with an updated values on that.    Review of Systems   All other systems reviewed and are negative.      Objective   Vitals:    01/12/18 0816   BP: 146/82   Pulse: 74   Temp: 98 °F (36.7 °C)   SpO2: 95%   Weight: 74.4 kg (164 lb)     Physical Exam   Constitutional: He appears well-developed and well-nourished.   HENT:   Head: Normocephalic and atraumatic.   Eyes: Conjunctivae are normal. Pupils are equal, round, and reactive to light.   Cardiovascular: Normal rate, regular rhythm and normal heart sounds.    Pulmonary/Chest: Effort normal and breath sounds normal.   Slightly decreased breath sounds   Neurological: He is alert.   Unremarkable gait and station   Skin: Skin is warm and dry.   Nursing note and vitals reviewed.      Lab Results   Component Value Date    INR 1.90 (A) 01/12/2018    INR 1.90 (A) 12/29/2017    INR 1.80 (A) 12/26/2017       Procedures    Assessment/Plan 1.  DVT    2.  Anticoagulation with Coumadin target range 2.0-3.0 plan change Coumadin      5/5/5/2.5 milligrams as a 4 day cycle INR in 1 week     3.  Hypertension elevated plan    Metoprolol 25 mg up to 1.5 tablets twice a day with follow-up in one week    4.  CRD stage III get updated BMP    Much of this encounter note is an electronic  transcription/translation of spoken language to printed text.  The electronic translation of spoken language may permit erroneous, or at times, nonsensical words or phrases to be inadvertently transcribed.  Although I have reviewed the note for such errors, some may still exist.

## 2018-01-16 ENCOUNTER — TRANSCRIBE ORDERS (OUTPATIENT)
Dept: ADMINISTRATIVE | Facility: HOSPITAL | Age: 73
End: 2018-01-16

## 2018-01-16 DIAGNOSIS — M25.472 PAIN AND SWELLING OF LEFT ANKLE: Primary | ICD-10-CM

## 2018-01-16 DIAGNOSIS — M79.662 PAIN AND SWELLING OF LEFT LOWER LEG: ICD-10-CM

## 2018-01-16 DIAGNOSIS — M79.89 PAIN AND SWELLING OF LEFT LOWER LEG: ICD-10-CM

## 2018-01-16 DIAGNOSIS — M25.572 PAIN AND SWELLING OF LEFT ANKLE: Primary | ICD-10-CM

## 2018-01-18 ENCOUNTER — HOSPITAL ENCOUNTER (OUTPATIENT)
Dept: CT IMAGING | Facility: HOSPITAL | Age: 73
Discharge: HOME | End: 2018-01-18
Attending: ORTHOPAEDIC SURGERY | Admitting: ORTHOPAEDIC SURGERY

## 2018-01-18 DIAGNOSIS — M25.572 PAIN AND SWELLING OF LEFT ANKLE: ICD-10-CM

## 2018-01-18 DIAGNOSIS — M79.89 PAIN AND SWELLING OF LEFT LOWER LEG: ICD-10-CM

## 2018-01-18 DIAGNOSIS — M25.472 PAIN AND SWELLING OF LEFT ANKLE: ICD-10-CM

## 2018-01-18 DIAGNOSIS — M79.662 PAIN AND SWELLING OF LEFT LOWER LEG: ICD-10-CM

## 2018-01-18 PROCEDURE — 73700 CT LOWER EXTREMITY W/O DYE: CPT

## 2018-01-19 ENCOUNTER — OFFICE VISIT (OUTPATIENT)
Dept: FAMILY MEDICINE CLINIC | Facility: CLINIC | Age: 73
End: 2018-01-19

## 2018-01-19 VITALS
SYSTOLIC BLOOD PRESSURE: 140 MMHG | DIASTOLIC BLOOD PRESSURE: 80 MMHG | OXYGEN SATURATION: 96 % | BODY MASS INDEX: 22.08 KG/M2 | TEMPERATURE: 97.7 F | WEIGHT: 163 LBS | HEART RATE: 68 BPM | HEIGHT: 72 IN

## 2018-01-19 DIAGNOSIS — J01.00 ACUTE MAXILLARY SINUSITIS, RECURRENCE NOT SPECIFIED: ICD-10-CM

## 2018-01-19 DIAGNOSIS — Z79.01 LONG TERM CURRENT USE OF ANTICOAGULANTS WITH INR GOAL OF 2.0-3.0: ICD-10-CM

## 2018-01-19 DIAGNOSIS — I82.4Z2 DEEP VEIN THROMBOSIS (DVT) OF DISTAL VEIN OF LEFT LOWER EXTREMITY, UNSPECIFIED CHRONICITY (HCC): Primary | ICD-10-CM

## 2018-01-19 LAB — INR PPP: 2 (ref 0.9–1.1)

## 2018-01-19 PROCEDURE — 99213 OFFICE O/P EST LOW 20 MIN: CPT | Performed by: INTERNAL MEDICINE

## 2018-01-19 PROCEDURE — 85610 PROTHROMBIN TIME: CPT | Performed by: INTERNAL MEDICINE

## 2018-01-19 PROCEDURE — 36416 COLLJ CAPILLARY BLOOD SPEC: CPT | Performed by: INTERNAL MEDICINE

## 2018-01-19 RX ORDER — AMOXICILLIN AND CLAVULANATE POTASSIUM 875; 125 MG/1; MG/1
1 TABLET, FILM COATED ORAL EVERY 12 HOURS SCHEDULED
Qty: 20 TABLET | Refills: 0 | Status: SHIPPED | OUTPATIENT
Start: 2018-01-19 | End: 2018-09-06

## 2018-01-19 NOTE — PROGRESS NOTES
Subjective   Narciso Romano is a 72 y.o. male.   Patient follow-up on DVT and anticoagulation with Coumadin some adjustments made no dietary indiscretions lately also complaining of a problem mainly maxillary sinuses.  History of Present Illness   5/5/5/2.5 his current dosage of Coumadin for his DVT prophylaxis.  Patient is listed allergic to Keflex can take amoxicillin as well as Augmentin for sinus problems which are bothering him a lot.  Currently pressure over the face more so  Sinuses lungs without significant involvement at this  time.    Review of Systems   HENT: Positive for postnasal drip, sinus pressure and sneezing.    All other systems reviewed and are negative.      Objective   Vitals:    01/19/18 0820   BP: 140/80   Pulse: 68   Temp: 97.7 °F (36.5 °C)   SpO2: 96%   Weight: 73.9 kg (163 lb)     Physical Exam   Constitutional: He appears well-developed and well-nourished.   HENT:   Head: Normocephalic and atraumatic.   Pressure maxillary sinuses   Eyes: Conjunctivae are normal. Pupils are equal, round, and reactive to light.   Cardiovascular: Normal rate, regular rhythm and normal heart sounds.    Pulmonary/Chest: Effort normal and breath sounds normal.   Nursing note and vitals reviewed.      Lab Results   Component Value Date    INR 2.00 (A) 01/19/2018    INR 1.90 (A) 01/12/2018    INR 1.90 (A) 12/29/2017       Procedures    Assessment/Plan   1.  DVT    2.  Anticoagulation with Coumadin continue 5/5/5/2.5 mg 4 day cycle repeat INR 1 month    3.  Maxillary sinusitis plan Augmentin 875 twice a day ×10 days call or follow-up not well in 10 days    Much of this encounter note is an electronic transcription/translation of spoken language to printed text.  The electronic translation of spoken language may permit erroneous, or at times, nonsensical words or phrases to be inadvertently transcribed.  Although I have reviewed the note for such errors, some may still exist.  augmenin [id number]

## 2018-02-16 ENCOUNTER — OFFICE VISIT (OUTPATIENT)
Dept: FAMILY MEDICINE CLINIC | Facility: CLINIC | Age: 73
End: 2018-02-16

## 2018-02-16 VITALS
WEIGHT: 161 LBS | SYSTOLIC BLOOD PRESSURE: 150 MMHG | HEART RATE: 78 BPM | BODY MASS INDEX: 21.81 KG/M2 | OXYGEN SATURATION: 97 % | DIASTOLIC BLOOD PRESSURE: 84 MMHG | HEIGHT: 72 IN | TEMPERATURE: 98.8 F

## 2018-02-16 DIAGNOSIS — J32.0 MAXILLARY SINUSITIS, UNSPECIFIED CHRONICITY: Primary | ICD-10-CM

## 2018-02-16 DIAGNOSIS — I82.4Z9 DEEP VEIN THROMBOSIS (DVT) OF DISTAL VEIN OF LOWER EXTREMITY, UNSPECIFIED CHRONICITY, UNSPECIFIED LATERALITY (HCC): ICD-10-CM

## 2018-02-16 DIAGNOSIS — Z79.01 LONG TERM CURRENT USE OF ANTICOAGULANTS WITH INR GOAL OF 2.0-3.0: ICD-10-CM

## 2018-02-16 DIAGNOSIS — I10 ESSENTIAL HYPERTENSION: ICD-10-CM

## 2018-02-16 LAB — INR PPP: 1.7 (ref 0.9–1.1)

## 2018-02-16 PROCEDURE — 85610 PROTHROMBIN TIME: CPT | Performed by: INTERNAL MEDICINE

## 2018-02-16 PROCEDURE — 36416 COLLJ CAPILLARY BLOOD SPEC: CPT | Performed by: INTERNAL MEDICINE

## 2018-02-16 PROCEDURE — 99213 OFFICE O/P EST LOW 20 MIN: CPT | Performed by: INTERNAL MEDICINE

## 2018-02-16 RX ORDER — AMOXICILLIN 875 MG/1
875 TABLET, COATED ORAL 2 TIMES DAILY
Qty: 20 TABLET | Refills: 0 | Status: SHIPPED | OUTPATIENT
Start: 2018-02-16 | End: 2018-02-26

## 2018-02-16 RX ORDER — METOPROLOL TARTRATE 50 MG/1
50 TABLET, FILM COATED ORAL 2 TIMES DAILY
Refills: 2 | COMMUNITY
Start: 2018-01-23 | End: 2019-02-18 | Stop reason: SDUPTHER

## 2018-02-16 RX ORDER — MONTELUKAST SODIUM 10 MG/1
10 TABLET ORAL NIGHTLY
Qty: 30 TABLET | Refills: 5 | Status: SHIPPED | OUTPATIENT
Start: 2018-02-16

## 2018-02-16 NOTE — PROGRESS NOTES
Subjective   Narciso Romano is a 72 y.o. male.   History of DVT, and anticoagulation  with Coumadin sinus drainage  History of Present Illness   Compliant with meds INR still slightly today patient has been eating some cheddar cheese and fresh tomatoes as possible reason.  We'll have him watch his diet intake better with recheck in 2 weeks time blood pressure slightly up today we'll have him monitor this also is still elevated on return then we will upward adjust his medications.  No headache or other symptoms with this.  Also Conrad may have potential forthcoming foot surgery so we need his blood pressure at an appropriate level before he is allowed to get his surgery done.  Does complain some sinus drainage also and pressure was given amoxicillin seen there before which helped we have him refill on this.  No reported increased temperature    Review of Systems   All other systems reviewed and are negative.      Objective   Vitals:    02/16/18 0835   BP: 150/84   Pulse: 78   Temp: 98.8 °F (37.1 °C)   SpO2: 97%   Weight: 73 kg (161 lb)     Physical Exam   Constitutional: He appears well-developed and well-nourished.   HENT:   Head: Normocephalic and atraumatic.   Complain of mild pressure in maxillary sinuses   Eyes: Conjunctivae are normal. Pupils are equal, round, and reactive to light.   Cardiovascular: Normal rate, regular rhythm and normal heart sounds.    Pulmonary/Chest: Effort normal and breath sounds normal.   Nursing note and vitals reviewed.      Lab Results   Component Value Date    INR 1.70 (A) 02/16/2018    INR 2.00 (A) 01/19/2018    INR 1.90 (A) 01/12/2018       Procedures    Assessment/Plan 1.  DVT    2.  Anticoagulation Coumadin long-term plan Coumadin    Cont 5/5/5/2.5 milligrams with 2 weeks INR    3.  Hypertension suboptimally controlled plan monitor for now recheck in 2 weeks.  If still elevated we'll upward adjustment medication.    4.  Maxillary sinusitis plan amoxicillin 875 twice a day for  10 days' time refill Singulair with several refills patient continue taking.  Follow-up if not well in 10 days' time and as needed.    Much of this encounter note is an electronic transcription/translation of spoken language to printed text.  The electronic translation of spoken language may permit erroneous, or at times, nonsensical words or phrases to be inadvertently transcribed.  Although I have reviewed the note for such errors, some may still exist.

## 2018-02-28 ENCOUNTER — TRANSCRIBE ORDERS (OUTPATIENT)
Dept: ADMINISTRATIVE | Facility: HOSPITAL | Age: 73
End: 2018-02-28

## 2018-02-28 DIAGNOSIS — I70.90 ATHEROSCLEROSIS: Primary | ICD-10-CM

## 2018-03-02 ENCOUNTER — OFFICE VISIT (OUTPATIENT)
Dept: FAMILY MEDICINE CLINIC | Facility: CLINIC | Age: 73
End: 2018-03-02

## 2018-03-02 ENCOUNTER — HOSPITAL ENCOUNTER (OUTPATIENT)
Dept: CARDIOLOGY | Facility: HOSPITAL | Age: 73
Discharge: HOME | End: 2018-03-02
Attending: ORTHOPAEDIC SURGERY | Admitting: ORTHOPAEDIC SURGERY

## 2018-03-02 VITALS
SYSTOLIC BLOOD PRESSURE: 160 MMHG | BODY MASS INDEX: 21.81 KG/M2 | WEIGHT: 161 LBS | OXYGEN SATURATION: 93 % | TEMPERATURE: 98.1 F | HEART RATE: 80 BPM | DIASTOLIC BLOOD PRESSURE: 84 MMHG | HEIGHT: 72 IN

## 2018-03-02 DIAGNOSIS — Z79.01 LONG TERM CURRENT USE OF ANTICOAGULANTS WITH INR GOAL OF 2.0-3.0: Primary | ICD-10-CM

## 2018-03-02 DIAGNOSIS — I82.4Z9 DEEP VEIN THROMBOSIS (DVT) OF DISTAL VEIN OF LOWER EXTREMITY, UNSPECIFIED CHRONICITY, UNSPECIFIED LATERALITY (HCC): ICD-10-CM

## 2018-03-02 DIAGNOSIS — I70.90 ATHEROSCLEROSIS: ICD-10-CM

## 2018-03-02 DIAGNOSIS — I10 HYPERTENSION, ESSENTIAL: ICD-10-CM

## 2018-03-02 LAB
BH CV LOWER ARTERIAL LEFT ABI RATIO: 1.05
BH CV LOWER ARTERIAL LEFT DORSALIS PEDIS SYS MAX: 194 MMHG
BH CV LOWER ARTERIAL LEFT GREAT TOE SYS MAX: 161 MMHG
BH CV LOWER ARTERIAL LEFT POST TIBIAL SYS MAX: 195 MMHG
BH CV LOWER ARTERIAL LEFT TBI RATIO: 0.87
BH CV LOWER ARTERIAL RIGHT ABI RATIO: 1.08
BH CV LOWER ARTERIAL RIGHT DORSALIS PEDIS SYS MAX: 201 MMHG
BH CV LOWER ARTERIAL RIGHT GREAT TOE SYS MAX: 153 MMHG
BH CV LOWER ARTERIAL RIGHT POST TIBIAL SYS MAX: 185 MMHG
BH CV LOWER ARTERIAL RIGHT TBI RATIO: 0.82
INR PPP: 1.8 (ref 0.9–1.1)
UPPER ARTERIAL LEFT ARM BRACHIAL SYS MAX: 185 MMHG
UPPER ARTERIAL RIGHT ARM BRACHIAL SYS MAX: 186 MMHG

## 2018-03-02 PROCEDURE — 99213 OFFICE O/P EST LOW 20 MIN: CPT | Performed by: INTERNAL MEDICINE

## 2018-03-02 PROCEDURE — 85610 PROTHROMBIN TIME: CPT | Performed by: INTERNAL MEDICINE

## 2018-03-02 PROCEDURE — 93922 UPR/L XTREMITY ART 2 LEVELS: CPT

## 2018-03-02 PROCEDURE — 36416 COLLJ CAPILLARY BLOOD SPEC: CPT | Performed by: INTERNAL MEDICINE

## 2018-03-02 RX ORDER — NIFEDIPINE 30 MG/1
30 TABLET, FILM COATED, EXTENDED RELEASE ORAL DAILY
Qty: 30 TABLET | Refills: 1 | Status: SHIPPED | OUTPATIENT
Start: 2018-03-02 | End: 2018-05-09 | Stop reason: SDUPTHER

## 2018-03-02 NOTE — PROGRESS NOTES
Subjective   Narciso Romano is a 72 y.o. male.   Follow-up on patient's DVT and INR for anticoagulation with Coumadin  History of Present Illness   5/5/5/2.5 his current dosing schedule patient's currently being evaluated for headache surgeon we discussed this briefly he will be seeing his cardiologist for cardiac clearance prior to proceeding with surgery on his foot/ankle area..  Blood pressures not be up today was last time we'll increase his nifedipine up to total extended release 120 mg daily is on 90 we are going to add 30 mg recheck blood pressure in 1 week's time with follow-up on INR.  Review of Systems   All other systems reviewed and are negative.      Objective   Vitals:    03/02/18 0824   BP: 160/84   Pulse: 80   Temp: 98.1 °F (36.7 °C)   SpO2: 93%   Weight: 73 kg (161 lb)     Physical Exam    Lab Results   Component Value Date    INR 1.80 (A) 03/02/2018    INR 1.70 (A) 02/16/2018    INR 2.00 (A) 01/19/2018       Procedures    Assessment/Plan 1.  DVT    2.  Anticoagulation with Coumadin plan increase Coumadin 5 mg daily INR 1 week    3.  Hypertension suboptimally controlled plan increase nifedipine extended release to total 120 mg daily he has nifedipine ER 90 mg today we will add 30 mg tablets to this regimen recheck blood pressure       Much of this encounter note is an electronic transcription/translation of spoken language to printed text.  The electronic translation of spoken language may permit erroneous, or at times, nonsensical words or phrases to be inadvertently transcribed.  Although I have reviewed the note for such errors, some may still exist.

## 2018-03-09 ENCOUNTER — OFFICE VISIT (OUTPATIENT)
Dept: FAMILY MEDICINE CLINIC | Facility: CLINIC | Age: 73
End: 2018-03-09

## 2018-03-09 VITALS
SYSTOLIC BLOOD PRESSURE: 120 MMHG | DIASTOLIC BLOOD PRESSURE: 70 MMHG | TEMPERATURE: 97.7 F | WEIGHT: 165.6 LBS | HEART RATE: 78 BPM | BODY MASS INDEX: 22.43 KG/M2 | OXYGEN SATURATION: 96 % | HEIGHT: 72 IN

## 2018-03-09 DIAGNOSIS — I82.4Z9 DEEP VEIN THROMBOSIS (DVT) OF DISTAL VEIN OF LOWER EXTREMITY, UNSPECIFIED CHRONICITY, UNSPECIFIED LATERALITY (HCC): ICD-10-CM

## 2018-03-09 DIAGNOSIS — Z79.01 LONG TERM CURRENT USE OF ANTICOAGULANTS WITH INR GOAL OF 2.0-3.0: Primary | ICD-10-CM

## 2018-03-09 LAB — INR PPP: 2.1 (ref 0.9–1.1)

## 2018-03-09 PROCEDURE — 36416 COLLJ CAPILLARY BLOOD SPEC: CPT | Performed by: INTERNAL MEDICINE

## 2018-03-09 PROCEDURE — 85610 PROTHROMBIN TIME: CPT | Performed by: INTERNAL MEDICINE

## 2018-03-09 PROCEDURE — 99213 OFFICE O/P EST LOW 20 MIN: CPT | Performed by: INTERNAL MEDICINE

## 2018-03-09 NOTE — PROGRESS NOTES
"Subjective   Narciso Romano is a 72 y.o. male.   Patient with DVT and anticoagulation with Coumadin  History of Present Illness   As above target range on his Coumadin is 2.0-3.0 for his DVT.  Doing fairly well.  INR today is up to 2.1 with minor tweak.  Review of Systems   All other systems reviewed and are negative.      Objective   Vitals:    03/09/18 0837   BP: 120/70   Pulse: 78   Temp: 97.7 °F (36.5 °C)   SpO2: 96%   Weight: 75.1 kg (165 lb 9.6 oz)     Physical Exam   Constitutional: He appears well-developed and well-nourished.   HENT:   Head: Normocephalic and atraumatic.   Cardiovascular: Normal rate, regular rhythm and normal heart sounds.    Pulmonary/Chest: Effort normal and breath sounds normal.   Nursing note and vitals reviewed.      Lab Results   Component Value Date    INR 2.10 (A) 03/09/2018    INR 1.80 (A) 03/02/2018    INR 1.70 (A) 02/16/2018       Procedures    Assessment/Plan   1.  DVT    2.\"  Anticoagulation  With Coumadin plan continue Coumadin 5 mg daily    5mg qd  inr 1mo    Much of this encounter note is an electronic transcription/translation of spoken language to printed text.  The electronic translation of spoken language may permit erroneous, or at times, nonsensical words or phrases to be inadvertently transcribed.  Although I have reviewed the note for such errors, some may still exist.       "

## 2018-04-06 ENCOUNTER — OFFICE VISIT (OUTPATIENT)
Dept: FAMILY MEDICINE CLINIC | Facility: CLINIC | Age: 73
End: 2018-04-06

## 2018-04-06 VITALS
OXYGEN SATURATION: 92 % | TEMPERATURE: 98.1 F | BODY MASS INDEX: 22.57 KG/M2 | HEIGHT: 72 IN | HEART RATE: 72 BPM | WEIGHT: 166.6 LBS | DIASTOLIC BLOOD PRESSURE: 78 MMHG | SYSTOLIC BLOOD PRESSURE: 142 MMHG

## 2018-04-06 DIAGNOSIS — R35.0 URINE FREQUENCY: ICD-10-CM

## 2018-04-06 DIAGNOSIS — Z79.01 LONG TERM CURRENT USE OF ANTICOAGULANTS WITH INR GOAL OF 2.0-3.0: ICD-10-CM

## 2018-04-06 DIAGNOSIS — I82.4Z9 DEEP VEIN THROMBOSIS (DVT) OF DISTAL VEIN OF LOWER EXTREMITY, UNSPECIFIED CHRONICITY, UNSPECIFIED LATERALITY (HCC): Primary | ICD-10-CM

## 2018-04-06 LAB
BACTERIA UR QL AUTO: NORMAL /HPF
BILIRUB UR QL STRIP: NEGATIVE
CLARITY UR: CLEAR
COLOR UR: YELLOW
GLUCOSE UR STRIP-MCNC: NEGATIVE MG/DL
HGB UR QL STRIP.AUTO: NEGATIVE
INR PPP: 2.1 (ref 0.9–1.1)
KETONES UR QL STRIP: NEGATIVE
LEUKOCYTE ESTERASE UR QL STRIP.AUTO: NEGATIVE
NITRITE UR QL STRIP: NEGATIVE
PH UR STRIP.AUTO: 5.5 [PH] (ref 4.6–8)
PROT UR QL STRIP: ABNORMAL
RBC # UR: NORMAL /HPF
REF LAB TEST METHOD: NORMAL
SP GR UR STRIP: 1.01 (ref 1–1.03)
SQUAMOUS #/AREA URNS HPF: NORMAL /HPF
UROBILINOGEN UR QL STRIP: ABNORMAL
WBC UR QL AUTO: NORMAL /HPF

## 2018-04-06 PROCEDURE — 36416 COLLJ CAPILLARY BLOOD SPEC: CPT | Performed by: INTERNAL MEDICINE

## 2018-04-06 PROCEDURE — 99213 OFFICE O/P EST LOW 20 MIN: CPT | Performed by: INTERNAL MEDICINE

## 2018-04-06 PROCEDURE — 85610 PROTHROMBIN TIME: CPT | Performed by: INTERNAL MEDICINE

## 2018-04-06 PROCEDURE — 81001 URINALYSIS AUTO W/SCOPE: CPT | Performed by: INTERNAL MEDICINE

## 2018-04-06 NOTE — PROGRESS NOTES
Subjective   Narciso Romano is a 72 y.o. male.   dvt anticoagulation w coumadin  History of Present Illness   Doing fairly well blood pressures generally doing okay at home.  No recent chest pain complaints.  The rest sample Viagra if we have it and we do.  Having some increased urine frequency like every ER when he is awake.  History of some prostate trouble we will get a urine today he is going to need to see his urologist also.    Review of Systems    Objective   Vitals:    04/06/18 0811   BP: 142/78   Pulse: 72   Temp: 98.1 °F (36.7 °C)   SpO2: 92%   Weight: 75.6 kg (166 lb 9.6 oz)     Physical Exam   Constitutional: He appears well-developed and well-nourished.   HENT:   Head: Normocephalic and atraumatic.   Eyes: Conjunctivae are normal. Pupils are equal, round, and reactive to light.   Cardiovascular: Normal rate, regular rhythm and normal heart sounds.    Pulmonary/Chest: Effort normal and breath sounds normal.   Nursing note and vitals reviewed.      Lab Results   Component Value Date    INR 2.10 (A) 04/06/2018    INR 2.10 (A) 03/09/2018    INR 1.80 (A) 03/02/2018       Procedures    Assessment/Plan    1.  DVT    2.  Anticoagulation with Coumadin plan continue Coumadin  5qd INR 1mo    3.  Increased urine frequency plan await UA patient will need see his urologist also.  With known prostate issues.    Much of this encounter note is an electronic transcription/translation of spoken language to printed text.  The electronic translation of spoken language may permit erroneous, or at times, nonsensical words or phrases to be inadvertently transcribed.  Although I have reviewed the note for such errors, some may still exist. If there are questions or for further clarification, please contact me.

## 2018-04-16 RX ORDER — WARFARIN SODIUM 5 MG/1
10 TABLET ORAL DAILY
Qty: 180 TABLET | Refills: 3 | Status: SHIPPED | OUTPATIENT
Start: 2018-04-16 | End: 2019-10-02 | Stop reason: SDUPTHER

## 2018-04-16 RX ORDER — WARFARIN SODIUM 5 MG/1
TABLET ORAL
Qty: 60 TABLET | Refills: 4 | OUTPATIENT
Start: 2018-04-16

## 2018-05-09 RX ORDER — NIFEDIPINE 30 MG/1
30 TABLET, FILM COATED, EXTENDED RELEASE ORAL DAILY
Qty: 90 TABLET | Refills: 1 | Status: SHIPPED | OUTPATIENT
Start: 2018-05-09 | End: 2018-06-25 | Stop reason: SDUPTHER

## 2018-05-11 ENCOUNTER — OFFICE VISIT (OUTPATIENT)
Dept: FAMILY MEDICINE CLINIC | Facility: CLINIC | Age: 73
End: 2018-05-11

## 2018-05-11 VITALS
OXYGEN SATURATION: 94 % | BODY MASS INDEX: 22.35 KG/M2 | HEIGHT: 72 IN | WEIGHT: 165 LBS | DIASTOLIC BLOOD PRESSURE: 70 MMHG | TEMPERATURE: 98.1 F | SYSTOLIC BLOOD PRESSURE: 142 MMHG | HEART RATE: 89 BPM

## 2018-05-11 DIAGNOSIS — I82.4Z9 DEEP VEIN THROMBOSIS (DVT) OF DISTAL VEIN OF LOWER EXTREMITY, UNSPECIFIED CHRONICITY, UNSPECIFIED LATERALITY (HCC): ICD-10-CM

## 2018-05-11 LAB — INR PPP: 3.9 (ref 0.9–1.1)

## 2018-05-11 PROCEDURE — 90632 HEPA VACCINE ADULT IM: CPT | Performed by: INTERNAL MEDICINE

## 2018-05-11 PROCEDURE — 99213 OFFICE O/P EST LOW 20 MIN: CPT | Performed by: INTERNAL MEDICINE

## 2018-05-11 PROCEDURE — 90471 IMMUNIZATION ADMIN: CPT | Performed by: INTERNAL MEDICINE

## 2018-05-11 PROCEDURE — 85610 PROTHROMBIN TIME: CPT | Performed by: INTERNAL MEDICINE

## 2018-05-11 PROCEDURE — 36416 COLLJ CAPILLARY BLOOD SPEC: CPT | Performed by: INTERNAL MEDICINE

## 2018-05-11 NOTE — PROGRESS NOTES
Subjective   Narciso Romano is a 72 y.o. male.   History of DVT, on Coumadin for same.  No changes in diet takes 5 mg Coumadin daily  History of Present Illness   Patient reports no dietary indiscretions or missed doses today's INR is unexpectedly elevated at 3.90 obvious reason by history.  Other medicine changes or other problems as described.    Review of Systems   All other systems reviewed and are negative.      Objective   Vitals:    05/11/18 0809   BP: 142/70   Pulse: 89   Temp: 98.1 °F (36.7 °C)   SpO2: 94%   Weight: 74.8 kg (165 lb)     Physical Exam   Constitutional: He appears well-developed and well-nourished.   HENT:   Head: Normocephalic and atraumatic.   Cardiovascular: Normal rate, regular rhythm and normal heart sounds.    Pulmonary/Chest: Effort normal and breath sounds normal.   Nursing note and vitals reviewed.      Lab Results   Component Value Date    INR 3.90 (A) 05/11/2018    INR 2.10 (A) 04/06/2018    INR 2.10 (A) 03/09/2018       Procedures    Assessment/Plan 1.  DVT    2.  Anticoagulation with Coumadin target range 2. April oh    0 mg today then 2.5/5/5 milligrams is three-day cycle  inr on Tuesday    Much of this encounter note is an electronic transcription/translation of spoken language to printed text.  The electronic translation of spoken language may permit erroneous, or at times, nonsensical words or phrases to be inadvertently transcribed.  Although I have reviewed the note for such errors, some may still exist. If there are questions or for further clarification, please contact me.

## 2018-05-15 ENCOUNTER — OFFICE VISIT (OUTPATIENT)
Dept: FAMILY MEDICINE CLINIC | Facility: CLINIC | Age: 73
End: 2018-05-15

## 2018-05-15 VITALS
OXYGEN SATURATION: 91 % | HEIGHT: 72 IN | WEIGHT: 163 LBS | BODY MASS INDEX: 22.08 KG/M2 | TEMPERATURE: 98.1 F | DIASTOLIC BLOOD PRESSURE: 78 MMHG | SYSTOLIC BLOOD PRESSURE: 124 MMHG | HEART RATE: 74 BPM

## 2018-05-15 DIAGNOSIS — Z79.01 LONG TERM CURRENT USE OF ANTICOAGULANTS WITH INR GOAL OF 2.0-3.0: Primary | ICD-10-CM

## 2018-05-15 DIAGNOSIS — I82.4Z9 DEEP VEIN THROMBOSIS (DVT) OF DISTAL VEIN OF LOWER EXTREMITY, UNSPECIFIED CHRONICITY, UNSPECIFIED LATERALITY (HCC): ICD-10-CM

## 2018-05-15 DIAGNOSIS — I10 HYPERTENSION, ESSENTIAL: ICD-10-CM

## 2018-05-15 LAB — INR PPP: 2.2 (ref 0.9–1.1)

## 2018-05-15 PROCEDURE — 99213 OFFICE O/P EST LOW 20 MIN: CPT | Performed by: INTERNAL MEDICINE

## 2018-05-15 PROCEDURE — 85610 PROTHROMBIN TIME: CPT | Performed by: INTERNAL MEDICINE

## 2018-05-15 PROCEDURE — 36416 COLLJ CAPILLARY BLOOD SPEC: CPT | Performed by: INTERNAL MEDICINE

## 2018-05-15 NOTE — PROGRESS NOTES
Subjective   Narciso Romano is a 72 y.o. male.   Follow-up on DVT, anticoagulation with Coumadin INR last visit was 3.9 patient's Coumadin has been redone as a 2.5/5/5 cycle also blood pressure readings been borderline last 2 visits initial reading today is a  History of Present Illness   Patient is taking his blood pressure medicine regularly initially got 140/80 repeat blood pressure after being here for hospital much improved so we'll continue present blood pressure medicine INR today is satisfactory at 2.2 we will have patient continues to 0.5/5/5 Coumadin which he takes for DVTs and a follow-up INR in 2 weeks time and visit.    Review of Systems   All other systems reviewed and are negative.      Objective   Vitals:    05/15/18 0801   BP: 148/80   Pulse: 74   Temp: 98.1 °F (36.7 °C)   SpO2: 91%   Weight: 73.9 kg (163 lb)     Physical Exam   Constitutional: He appears well-developed and well-nourished.   HENT:   Head: Normocephalic and atraumatic.   Eyes: Conjunctivae are normal. Pupils are equal, round, and reactive to light.   Cardiovascular: Normal rate, regular rhythm and normal heart sounds.    Pulmonary/Chest: Effort normal and breath sounds normal.   Nursing note and vitals reviewed.      Lab Results   Component Value Date    INR 2.20 (A) 05/15/2018    INR 3.90 (A) 05/11/2018    INR 2.10 (A) 04/06/2018       Procedures    Assessment/Plan .  1.  DVT    2.  Anticoagulation with Coumadin plan Coumadin    Continue 2.5/5/5 milligram cycle,  inr 2wks    3.  Hypertension controlled at present is level continued observation with recheck in about 6 months.    Much of this encounter note is an electronic transcription/translation of spoken language to printed text.  The electronic translation of spoken language may permit erroneous, or at times, nonsensical words or phrases to be inadvertently transcribed.  Although I have reviewed the note for such errors, some may still exist. If there are questions or for  further clarification, please contact me.

## 2018-05-22 RX ORDER — WARFARIN SODIUM 5 MG/1
TABLET ORAL
Qty: 60 TABLET | Refills: 4 | Status: SHIPPED | OUTPATIENT
Start: 2018-05-22 | End: 2020-06-29 | Stop reason: ALTCHOICE

## 2018-05-29 ENCOUNTER — OFFICE VISIT (OUTPATIENT)
Dept: FAMILY MEDICINE CLINIC | Facility: CLINIC | Age: 73
End: 2018-05-29

## 2018-05-29 VITALS
DIASTOLIC BLOOD PRESSURE: 88 MMHG | TEMPERATURE: 98.3 F | HEART RATE: 78 BPM | BODY MASS INDEX: 22.24 KG/M2 | OXYGEN SATURATION: 93 % | WEIGHT: 164.2 LBS | SYSTOLIC BLOOD PRESSURE: 138 MMHG | HEIGHT: 72 IN

## 2018-05-29 DIAGNOSIS — J40 BRONCHITIS: ICD-10-CM

## 2018-05-29 DIAGNOSIS — Z79.01 LONG TERM CURRENT USE OF ANTICOAGULANTS WITH INR GOAL OF 2.0-3.0: Primary | ICD-10-CM

## 2018-05-29 DIAGNOSIS — I82.4Z9 DEEP VEIN THROMBOSIS (DVT) OF DISTAL VEIN OF LOWER EXTREMITY, UNSPECIFIED CHRONICITY, UNSPECIFIED LATERALITY (HCC): ICD-10-CM

## 2018-05-29 LAB — INR PPP: 2.5 (ref 0.9–1.1)

## 2018-05-29 PROCEDURE — 85610 PROTHROMBIN TIME: CPT | Performed by: INTERNAL MEDICINE

## 2018-05-29 PROCEDURE — 99213 OFFICE O/P EST LOW 20 MIN: CPT | Performed by: INTERNAL MEDICINE

## 2018-05-29 PROCEDURE — 36416 COLLJ CAPILLARY BLOOD SPEC: CPT | Performed by: INTERNAL MEDICINE

## 2018-05-29 RX ORDER — AMOXICILLIN 875 MG/1
875 TABLET, COATED ORAL 2 TIMES DAILY
Qty: 20 TABLET | Refills: 0 | Status: SHIPPED | OUTPATIENT
Start: 2018-05-29 | End: 2018-06-08

## 2018-05-29 NOTE — PROGRESS NOTES
Subjective   Narciso Romano is a 72 y.o. male.   Follow-up on DVT, anticoagulation with Coumadin, also cough and yellow sputum  History of Present Illness   As above compliant with his usual regimen of 5/5/2.5 mg Coumadin no dietary indiscretions.  Over the last few days been feeling somewhat tired coughing up some yellow sputum does have known element of COPD not wheezing breast Jaden reduced energy and sputum reduction we will display given amoxicillin for this can take that despite allergy to Keflex which causes hives.  No reported temperature complaints of nose running continuously no history of glaucoma consider Astelin trial for him.  No yellow sinus drainage or pressure in face noted    Review of Systems   HENT: Positive for congestion.    Respiratory: Positive for cough.    All other systems reviewed and are negative.      Objective   Vitals:    05/29/18 0809   BP: 138/88   Pulse: 78   Temp: 98.3 °F (36.8 °C)   SpO2: 93%   Weight: 74.5 kg (164 lb 3.2 oz)     Physical Exam   Constitutional: He appears well-developed and well-nourished.   HENT:   Head: Normocephalic and atraumatic.   Eyes: Conjunctivae are normal. Pupils are equal, round, and reactive to light.   Cardiovascular: Normal rate, regular rhythm and normal heart sounds.    Pulmonary/Chest: Effort normal and breath sounds normal.   Nursing note and vitals reviewed.      Lab Results   Component Value Date    INR 2.50 (A) 05/29/2018    INR 2.20 (A) 05/15/2018    INR 3.90 (A) 05/11/2018       Procedures    Assessment/Plan 1.  DVT    2.  Anticoagulation with Coumadin target range 2.0-3.0  Plan continue 5/5/2.5 Coumadin cycle with INR 1 month    3.  Bronchitis plan amoxicillin 875 twice a day for 10 days follow-up or cough not well in 10 days' time if symptoms worsen.Astelin nasal spray 1-2 sprays each nares for nasopharyngeal drainage.    Much of this encounter note is an electronic transcription/translation of spoken language to printed text.  The  electronic translation of spoken language may permit erroneous, or at times, nonsensical words or phrases to be inadvertently transcribed.  Although I have reviewed the note for such errors, some may still exist. If there are questions or for further clarification, please contact me.

## 2018-06-13 RX ORDER — ISOSORBIDE DINITRATE 20 MG/1
TABLET ORAL
Qty: 90 TABLET | Refills: 2 | Status: SHIPPED | OUTPATIENT
Start: 2018-06-13 | End: 2019-06-17 | Stop reason: SDUPTHER

## 2018-06-13 RX ORDER — TAMSULOSIN HYDROCHLORIDE 0.4 MG/1
CAPSULE ORAL
Qty: 90 CAPSULE | Refills: 3 | Status: ON HOLD | OUTPATIENT
Start: 2018-06-13 | End: 2018-09-19

## 2018-06-25 ENCOUNTER — OFFICE VISIT (OUTPATIENT)
Dept: FAMILY MEDICINE CLINIC | Facility: CLINIC | Age: 73
End: 2018-06-25

## 2018-06-25 VITALS
BODY MASS INDEX: 22.21 KG/M2 | DIASTOLIC BLOOD PRESSURE: 86 MMHG | HEIGHT: 72 IN | OXYGEN SATURATION: 91 % | WEIGHT: 164 LBS | HEART RATE: 74 BPM | SYSTOLIC BLOOD PRESSURE: 142 MMHG | TEMPERATURE: 97.7 F

## 2018-06-25 DIAGNOSIS — B35.1 OM (ONYCHOMYCOSIS): ICD-10-CM

## 2018-06-25 DIAGNOSIS — Z79.01 LONG TERM CURRENT USE OF ANTICOAGULANTS WITH INR GOAL OF 2.0-3.0: Primary | ICD-10-CM

## 2018-06-25 DIAGNOSIS — I82.4Z9 DEEP VEIN THROMBOSIS (DVT) OF DISTAL VEIN OF LOWER EXTREMITY, UNSPECIFIED CHRONICITY, UNSPECIFIED LATERALITY (HCC): ICD-10-CM

## 2018-06-25 LAB — INR PPP: 2.6 (ref 0.9–1.1)

## 2018-06-25 PROCEDURE — 36416 COLLJ CAPILLARY BLOOD SPEC: CPT | Performed by: INTERNAL MEDICINE

## 2018-06-25 PROCEDURE — 85610 PROTHROMBIN TIME: CPT | Performed by: INTERNAL MEDICINE

## 2018-06-25 PROCEDURE — 99213 OFFICE O/P EST LOW 20 MIN: CPT | Performed by: INTERNAL MEDICINE

## 2018-06-25 RX ORDER — FLUCONAZOLE 200 MG/1
200 TABLET ORAL
Qty: 12 TABLET | Refills: 1 | Status: SHIPPED | OUTPATIENT
Start: 2018-06-25 | End: 2018-09-06

## 2018-06-25 NOTE — PROGRESS NOTES
Subjective   Narciso Romano is a 72 y.o. male.   Patient follow-up on history of DVT chronic anticoagulation with Coumadin for same  History of Present Illness   5/5/2.5 his current stable dosing of Coumadin as a three-day cycle patient also spun about fungal toenails with this.  Mainly his left first toenail did discuss medications given his particular as he should not take Sporanox or Lamisil as he should not be off of his statin for long periods time.  Diflucan be appropriate did mention topical creams which should be C and not a covered by insurance plans with her topical creams as second line treatment plan also includes refractory then referral to podiatrist as a general plan blood pressure elevated today he is continue monitoring at home increase I will tweak his blood pressure meds slightly no other complaints this point.    Review of Systems   All other systems reviewed and are negative.      Objective   Vitals:    06/25/18 0807   BP: 142/86   Pulse: 74   Temp: 97.7 °F (36.5 °C)   SpO2: 91%   Weight: 74.4 kg (164 lb)     Physical Exam   Constitutional: He appears well-developed and well-nourished.   HENT:   Head: Normocephalic and atraumatic.   Eyes: Conjunctivae are normal. Pupils are equal, round, and reactive to light.   Cardiovascular: Normal rate, regular rhythm and normal heart sounds.    Pulmonary/Chest: Effort normal and breath sounds normal.   Abdominal: Soft. Bowel sounds are normal.   Skin: Skin is warm and dry.   Callused and raised left first toenail minor discoloration on second third and fourth toenails all consistent with onychomycosis   Nursing note and vitals reviewed.      Lab Results   Component Value Date    INR 2.60 (A) 06/25/2018    INR 2.50 (A) 05/29/2018    INR 2.20 (A) 05/15/2018       Procedures    Assessment/Plan 1.  DVT    2.  Anticoagulation with Coumadin target range 2.0-3.0 plan Coumadin  Dosage is 5/5/2.5 cycle  Cont carmen einr 1mo    3.  Onychomycosis left foot plan  Diflucan 200 weekly patient does not take a statin the day that he takes his Diflucan quantity of 12 with 1 refill was advised to take full 6 months.  If there is no clinical response and for months we will switch him over to another product.    Much of this encounter note is an electronic transcription/translation of spoken language to printed text.  The electronic translation of spoken language may permit erroneous, or at times, nonsensical words or phrases to be inadvertently transcribed.  Although I have reviewed the note for such errors, some may still exist. If there are questions or for further clarification, please contact me.    Om  difluccan 200

## 2018-07-18 RX ORDER — HYDRALAZINE HYDROCHLORIDE 100 MG/1
TABLET, FILM COATED ORAL
Qty: 270 TABLET | Refills: 0 | Status: SHIPPED | OUTPATIENT
Start: 2018-07-18 | End: 2019-04-19 | Stop reason: SDUPTHER

## 2018-07-23 ENCOUNTER — OFFICE VISIT (OUTPATIENT)
Dept: FAMILY MEDICINE CLINIC | Facility: CLINIC | Age: 73
End: 2018-07-23

## 2018-07-23 VITALS
DIASTOLIC BLOOD PRESSURE: 70 MMHG | BODY MASS INDEX: 21.4 KG/M2 | TEMPERATURE: 97.6 F | SYSTOLIC BLOOD PRESSURE: 120 MMHG | OXYGEN SATURATION: 97 % | WEIGHT: 158 LBS | HEIGHT: 72 IN | HEART RATE: 62 BPM

## 2018-07-23 DIAGNOSIS — I10 HYPERTENSION, ESSENTIAL: ICD-10-CM

## 2018-07-23 DIAGNOSIS — I82.4Z9 DEEP VEIN THROMBOSIS (DVT) OF DISTAL VEIN OF LOWER EXTREMITY, UNSPECIFIED CHRONICITY, UNSPECIFIED LATERALITY (HCC): ICD-10-CM

## 2018-07-23 DIAGNOSIS — Z79.01 LONG TERM CURRENT USE OF ANTICOAGULANTS WITH INR GOAL OF 2.0-3.0: Primary | ICD-10-CM

## 2018-07-23 LAB — INR PPP: 8 (ref 0.9–1.1)

## 2018-07-23 PROCEDURE — 36416 COLLJ CAPILLARY BLOOD SPEC: CPT | Performed by: INTERNAL MEDICINE

## 2018-07-23 PROCEDURE — 85610 PROTHROMBIN TIME: CPT | Performed by: INTERNAL MEDICINE

## 2018-07-23 PROCEDURE — 99213 OFFICE O/P EST LOW 20 MIN: CPT | Performed by: INTERNAL MEDICINE

## 2018-07-23 NOTE — PROGRESS NOTES
"Subjective   Narciso Romano is a 72 y.o. male.   Patient has DVT on \" anticoagulation with Coumadin for same.  Follow up on INR no dietary indiscretions  History of Present Illness   DVT on anticoagulation for this.  His hypertension also stroke is been doing well and is 5/5/2.5 regimen for DVT blood pressure is noted to be satisfactory at 120/70 patient does report having brussels sprouts yesterday no changes in Coumadin dosage.      Review of Systems   All other systems reviewed and are negative.      Objective   Physical Exam   Constitutional: He appears well-developed and well-nourished.   HENT:   Head: Normocephalic and atraumatic.   Eyes: Pupils are equal, round, and reactive to light.   Cardiovascular: Normal rate, regular rhythm and normal heart sounds.    Pulmonary/Chest: Effort normal and breath sounds normal.   Nursing note and vitals reviewed.      Assessment/Plan 1.  DVT    2.  Anticoagulation With Coumadin plan with INR elevated at 8.0  Hold coumadin  inr wed    3.  Hypertension controlled continue present medicine recheck 3 months      Much of this encounter note is an electronic transcription/translation of spoken language to printed text.  The electronic translation of spoken language may permit erroneous, or at times, nonsensical words or phrases to be inadvertently transcribed.  Although I have reviewed the note for such errors, some may still exist. If there are questions or for further clarification, please contact me.     "

## 2018-07-25 ENCOUNTER — OFFICE VISIT (OUTPATIENT)
Dept: FAMILY MEDICINE CLINIC | Facility: CLINIC | Age: 73
End: 2018-07-25

## 2018-07-25 VITALS
RESPIRATION RATE: 18 BRPM | DIASTOLIC BLOOD PRESSURE: 70 MMHG | BODY MASS INDEX: 21.54 KG/M2 | TEMPERATURE: 98 F | HEART RATE: 67 BPM | SYSTOLIC BLOOD PRESSURE: 120 MMHG | OXYGEN SATURATION: 94 % | WEIGHT: 159 LBS | HEIGHT: 72 IN

## 2018-07-25 DIAGNOSIS — I82.4Z9 DEEP VEIN THROMBOSIS (DVT) OF DISTAL VEIN OF LOWER EXTREMITY, UNSPECIFIED CHRONICITY, UNSPECIFIED LATERALITY (HCC): ICD-10-CM

## 2018-07-25 PROCEDURE — 99212 OFFICE O/P EST SF 10 MIN: CPT | Performed by: FAMILY MEDICINE

## 2018-07-25 NOTE — PROGRESS NOTES
SUBJECTIVE:  The patient is a 72-year-old -American male who comes in for follow-up pro time INR.  On his last visit his INR was 8.  He's been off Coumadin since July 23.  Today his INR is 6.5    PAST MEDICAL HISTORY:  Reviewed.    REVIEW OF SYSTEMS:  Please see above; 14 point ROS negative.      OBJECTIVE: Vitals signs are reviewed and are stable.    HEENT: PERRLA.   Neck:  Supple.   Lungs:  Clear.    Heart:  Regular rate and rhythm    ASSESSMENT:    History of DVT with over anticoagulation    PLAN: No Coumadin today or tomorrow.  Recheck pro time on July 7.  Call if problems.    Dictated utilizing Dragon dictation.

## 2018-07-27 ENCOUNTER — OFFICE VISIT (OUTPATIENT)
Dept: FAMILY MEDICINE CLINIC | Facility: CLINIC | Age: 73
End: 2018-07-27

## 2018-07-27 VITALS
HEART RATE: 83 BPM | OXYGEN SATURATION: 95 % | BODY MASS INDEX: 21.35 KG/M2 | HEIGHT: 72 IN | TEMPERATURE: 98.1 F | DIASTOLIC BLOOD PRESSURE: 90 MMHG | SYSTOLIC BLOOD PRESSURE: 150 MMHG | WEIGHT: 157.6 LBS

## 2018-07-27 DIAGNOSIS — I10 HYPERTENSION, ESSENTIAL: ICD-10-CM

## 2018-07-27 DIAGNOSIS — I82.4Z9 DEEP VEIN THROMBOSIS (DVT) OF DISTAL VEIN OF LOWER EXTREMITY, UNSPECIFIED CHRONICITY, UNSPECIFIED LATERALITY (HCC): ICD-10-CM

## 2018-07-27 DIAGNOSIS — Z79.01 LONG TERM CURRENT USE OF ANTICOAGULANTS WITH INR GOAL OF 2.0-3.0: Primary | ICD-10-CM

## 2018-07-27 LAB — INR PPP: 3.4 (ref 0.9–1.1)

## 2018-07-27 PROCEDURE — 99213 OFFICE O/P EST LOW 20 MIN: CPT | Performed by: INTERNAL MEDICINE

## 2018-07-27 PROCEDURE — 36416 COLLJ CAPILLARY BLOOD SPEC: CPT | Performed by: INTERNAL MEDICINE

## 2018-07-27 PROCEDURE — 85610 PROTHROMBIN TIME: CPT | Performed by: INTERNAL MEDICINE

## 2018-07-27 NOTE — PROGRESS NOTES
"Subjective   Narciso Romano is a 72 y.o. male.   history DVT,\" anticoagulation with Coumadin  History of Present Illness   Patient recently was 5/5/2.5 regimen readily stable for a long period time and slowly INR result 8 he's had his Coumadin held is now 3.4 today we will start his Coumadin again was weaned undershoot give patient 2.5 mg a day tomorrow and 5 mg on Sunday follow-up INR Monday doing fairly well blood pressures up slightly today we will recheck on return.  She has no complaints no chest pain complaints  Review of Systems   All other systems reviewed and are negative.      Objective   Vitals:    07/27/18 0756   BP: 150/90   Pulse: 83   Temp: 98.1 °F (36.7 °C)   SpO2: 95%   Weight: 71.5 kg (157 lb 9.6 oz)     Physical Exam   Constitutional: He appears well-developed and well-nourished.   HENT:   Head: Normocephalic and atraumatic.   Eyes: Pupils are equal, round, and reactive to light. Conjunctivae are normal.   Cardiovascular: Normal rate, regular rhythm and normal heart sounds.    Pulmonary/Chest: Effort normal and breath sounds normal.   Nursing note and vitals reviewed.      Lab Results   Component Value Date    INR 3.40 (A) 07/27/2018    INR 8.00 (A) 07/23/2018    INR 2.60 (A) 06/25/2018       Procedures    Assessment/Plan 1.  DVT    2.\"  Anticoagulation  With Coumadin plan new Coumadin dosage at 2.5/2.5/5 cycle with INR Monday    3.  Hypertension mildly elevated will recheck on Monday.    Much of this encounter note is an electronic transcription/translation of spoken language to printed text.  The electronic translation of spoken language may permit erroneous, or at times, nonsensical words or phrases to be inadvertently transcribed.  Although I have reviewed the note for such errors, some may still exist. If there are questions or for further clarification, please contact me.      2.5/2.5/5       "

## 2018-07-30 ENCOUNTER — OFFICE VISIT (OUTPATIENT)
Dept: FAMILY MEDICINE CLINIC | Facility: CLINIC | Age: 73
End: 2018-07-30

## 2018-07-30 VITALS
HEIGHT: 72 IN | RESPIRATION RATE: 18 BRPM | SYSTOLIC BLOOD PRESSURE: 146 MMHG | DIASTOLIC BLOOD PRESSURE: 80 MMHG | OXYGEN SATURATION: 93 % | HEART RATE: 65 BPM | TEMPERATURE: 97.8 F | BODY MASS INDEX: 21.4 KG/M2 | WEIGHT: 158 LBS

## 2018-07-30 DIAGNOSIS — I82.4Z9 DEEP VEIN THROMBOSIS (DVT) OF DISTAL VEIN OF LOWER EXTREMITY, UNSPECIFIED CHRONICITY, UNSPECIFIED LATERALITY (HCC): ICD-10-CM

## 2018-07-30 LAB — INR PPP: 2.4 (ref 0.9–1.1)

## 2018-07-30 PROCEDURE — 85610 PROTHROMBIN TIME: CPT | Performed by: FAMILY MEDICINE

## 2018-07-30 PROCEDURE — 99212 OFFICE O/P EST SF 10 MIN: CPT | Performed by: FAMILY MEDICINE

## 2018-07-30 PROCEDURE — 36416 COLLJ CAPILLARY BLOOD SPEC: CPT | Performed by: FAMILY MEDICINE

## 2018-07-30 NOTE — PROGRESS NOTES
SUBJECTIVE:  The patient is a 72-year-old -American male who is here for follow-up.  He's been over anticoagulated over his last several visits.  Since his last visit he is taking 2.5 mg of Coumadin for 2 days then 5 mg.  Today his INR is 2.4    PAST MEDICAL HISTORY:  Reviewed.    REVIEW OF SYSTEMS:  Please see above; 14 point ROS negative.      OBJECTIVE: Vitals signs are reviewed and are stable.    HEENT: PERRLA.   Neck:  Supple.   Lungs:  Clear.    Heart:  Regular rate and rhythm.     ASSESSMENT:   DVT     PLAN: Continue same dose of Coumadin and recheck in 1 week.  Call if problems.    Dictated utilizing Dragon dictation.

## 2018-08-07 ENCOUNTER — OFFICE VISIT (OUTPATIENT)
Dept: FAMILY MEDICINE CLINIC | Facility: CLINIC | Age: 73
End: 2018-08-07

## 2018-08-07 VITALS
DIASTOLIC BLOOD PRESSURE: 80 MMHG | WEIGHT: 156.8 LBS | TEMPERATURE: 98.2 F | OXYGEN SATURATION: 94 % | BODY MASS INDEX: 21.24 KG/M2 | SYSTOLIC BLOOD PRESSURE: 134 MMHG | HEART RATE: 76 BPM | HEIGHT: 72 IN

## 2018-08-07 DIAGNOSIS — I10 HYPERTENSION, ESSENTIAL: ICD-10-CM

## 2018-08-07 DIAGNOSIS — Z79.01 LONG TERM CURRENT USE OF ANTICOAGULANTS WITH INR GOAL OF 2.0-3.0: ICD-10-CM

## 2018-08-07 DIAGNOSIS — I82.4Z9 DEEP VEIN THROMBOSIS (DVT) OF DISTAL VEIN OF LOWER EXTREMITY, UNSPECIFIED CHRONICITY, UNSPECIFIED LATERALITY (HCC): Primary | ICD-10-CM

## 2018-08-07 LAB — INR PPP: 2.5 (ref 0.9–1.1)

## 2018-08-07 PROCEDURE — 99213 OFFICE O/P EST LOW 20 MIN: CPT | Performed by: INTERNAL MEDICINE

## 2018-08-07 PROCEDURE — 36416 COLLJ CAPILLARY BLOOD SPEC: CPT | Performed by: INTERNAL MEDICINE

## 2018-08-07 PROCEDURE — 85610 PROTHROMBIN TIME: CPT | Performed by: INTERNAL MEDICINE

## 2018-08-07 NOTE — PROGRESS NOTES
Subjective   Narciso Romano is a 72 y.o. male.     Coagulation Disorder            Review of Systems   All other systems reviewed and are negative.      Objective   Vitals:    08/07/18 0826   BP: 134/80   Pulse: 76   Temp: 98.2 °F (36.8 °C)   SpO2: 94%   Weight: 71.1 kg (156 lb 12.8 oz)     Physical Exam    Lab Results   Component Value Date    INR 2.50 (A) 08/07/2018    INR 2.40 (A) 07/30/2018    INR 3.40 (A) 07/27/2018       Procedures    Assessment/Plan

## 2018-08-07 NOTE — PROGRESS NOTES
"Subjective   Narciso Romano is a 72 y.o. male.   Patient with history of DVT also has high blood pressure is on Coumadin for this i.e. the DVT  History of Present Illness   DVT\" anticoagulation with Coumadin target range 2.0-3.0 currently is on 2.5/2.5/5 cycle no complaints blood pressure appears be satisfactory today no chest pain complaints doing well has known CAD    Review of Systems   All other systems reviewed and are negative.      Objective   Vitals:    08/07/18 0826   BP: 134/80   Pulse: 76   Temp: 98.2 °F (36.8 °C)   SpO2: 94%   Weight: 71.1 kg (156 lb 12.8 oz)     Physical Exam   Constitutional: He appears well-developed and well-nourished.   HENT:   Head: Normocephalic and atraumatic.   Eyes: Pupils are equal, round, and reactive to light. Conjunctivae are normal.   Cardiovascular: Normal rate, regular rhythm and normal heart sounds.    Pulmonary/Chest: Effort normal and breath sounds normal.   Nursing note and vitals reviewed.      Lab Results   Component Value Date    INR 2.40 (A) 07/30/2018    INR 3.40 (A) 07/27/2018    INR 8.00 (A) 07/23/2018       Procedures    Assessment/Plan 1.  DVT    2.  Anticoagulation With Coumadin target range 2.0-3.0 plan Coumadin    2.5/2.5/5 milligram cycle  In r1mo    3.  Hypertension controlled plan continue same    Much of this encounter note is an electronic transcription/translation of spoken language to printed text.  The electronic translation of spoken language may permit erroneous, or at times, nonsensical words or phrases to be inadvertently transcribed.  Although I have reviewed the note for such errors, some may still exist. If there are questions or for further clarification, please contact me.         "

## 2018-09-04 ENCOUNTER — OFFICE VISIT (OUTPATIENT)
Dept: FAMILY MEDICINE CLINIC | Facility: CLINIC | Age: 73
End: 2018-09-04

## 2018-09-04 VITALS
DIASTOLIC BLOOD PRESSURE: 74 MMHG | OXYGEN SATURATION: 94 % | HEIGHT: 72 IN | SYSTOLIC BLOOD PRESSURE: 140 MMHG | WEIGHT: 156.2 LBS | TEMPERATURE: 98.1 F | HEART RATE: 70 BPM | BODY MASS INDEX: 21.16 KG/M2

## 2018-09-04 DIAGNOSIS — S50.01XA CONTUSION OF RIGHT ELBOW, INITIAL ENCOUNTER: ICD-10-CM

## 2018-09-04 DIAGNOSIS — I82.4Z9 DEEP VEIN THROMBOSIS (DVT) OF DISTAL VEIN OF LOWER EXTREMITY, UNSPECIFIED CHRONICITY, UNSPECIFIED LATERALITY (HCC): ICD-10-CM

## 2018-09-04 DIAGNOSIS — S80.00XA CONTUSION OF KNEE, UNSPECIFIED LATERALITY, INITIAL ENCOUNTER: Primary | ICD-10-CM

## 2018-09-04 LAB — INR PPP: 4.7 (ref 0.9–1.1)

## 2018-09-04 PROCEDURE — 73562 X-RAY EXAM OF KNEE 3: CPT | Performed by: INTERNAL MEDICINE

## 2018-09-04 PROCEDURE — 99214 OFFICE O/P EST MOD 30 MIN: CPT | Performed by: INTERNAL MEDICINE

## 2018-09-04 PROCEDURE — 73080 X-RAY EXAM OF ELBOW: CPT | Performed by: INTERNAL MEDICINE

## 2018-09-04 PROCEDURE — 36416 COLLJ CAPILLARY BLOOD SPEC: CPT | Performed by: INTERNAL MEDICINE

## 2018-09-04 PROCEDURE — 85610 PROTHROMBIN TIME: CPT | Performed by: INTERNAL MEDICINE

## 2018-09-04 NOTE — PROGRESS NOTES
Subjective   Narciso Romano is a 72 y.o. male.   Fell while at [employer].  Complained of pain right elbow and right knee fell slightly to the right.  No other complaints not sure what crit came up also needs INR checked as patient is on long-term Coumadin for DVT  History of Present Illness   Fell on floor at [employer] hurt r elbow n r knee plain pain particularly right elbow right knee hurt initially but is better.  Needs follow-up on DVT for which she takes Coumadin no recent changes in dose INR is elevated today we will hold Coumadin for 2 days.    Review of Systems   All other systems reviewed and are negative.      Objective   Vitals:    09/04/18 0802   BP: 140/74   Pulse: 70   Temp: 98.1 °F (36.7 °C)   SpO2: 94%   Weight: 70.9 kg (156 lb 3.2 oz)     Physical Exam   Constitutional: He appears well-developed and well-nourished.   HENT:   Head: Normocephalic and atraumatic.   Eyes: Pupils are equal, round, and reactive to light. Conjunctivae are normal.   Cardiovascular: Normal rate, regular rhythm and normal heart sounds.    Pulmonary/Chest: Effort normal and breath sounds normal.   Abdominal: Soft. Bowel sounds are normal.   Musculoskeletal:   Right knee minimal discomfort palpation over bit anterior patella no swelling is noted.  Right knee is normal stressing in AP, lateral, medial motion.    Right elbow with either hematoma or olecranon bursitis or both posteriorly good range of motion of elbow.  No soreness but no true focal tenderness noted.  Form is nontender palpation of the increased bruising present there.  Right radial pulses 2+   Neurological: He is alert.   Relatively stable gait and station   Skin: Skin is warm and dry.   Nursing note and vitals reviewed.      Lab Results   Component Value Date    INR 4.70 (A) 09/04/2018    INR 2.50 (A) 08/07/2018    INR 2.40 (A) 07/30/2018       Procedures  X-ray sunrise shows no acute abnormalities noted no comparison available    X-ray right knee  degenerative changes are noted minimal arthritic changes noted no acute bony or soft tissue abnormalities noted no comparison available    X-ray right elbow 3 views obtained.  Some minimal spurring noted no acute bony or soft tissue abnormalities are noted.  No comparison is available.  Soft tissue swelling is noted posteriorly  Assessment/Plan elbow contusion /olecranon bursitis  status post fall plan observation for now refer to Dr. Marcell Martinez's group for further evaluation disc of x-ray goes with him      Contusion right knee status post fall plan observation only can discuss with orthopedic surgeon disc of x-rays goes with him      dvt          Anticoagulation with Coumadin target range 2.0-3.0 plan  0 coumadin for 2d  inr thur.      Much of this encounter note is an electronic transcription/translation of spoken language to printed text.  The electronic translation of spoken language may permit erroneous, or at times, nonsensical words or phrases to be inadvertently transcribed.  Although I have reviewed the note for such errors, some may still exist. If there are questions or for further clarification, please contact me.

## 2018-09-06 ENCOUNTER — OFFICE VISIT (OUTPATIENT)
Dept: FAMILY MEDICINE CLINIC | Facility: CLINIC | Age: 73
End: 2018-09-06

## 2018-09-06 VITALS
OXYGEN SATURATION: 94 % | BODY MASS INDEX: 21.13 KG/M2 | DIASTOLIC BLOOD PRESSURE: 78 MMHG | TEMPERATURE: 98.5 F | WEIGHT: 156 LBS | HEIGHT: 72 IN | SYSTOLIC BLOOD PRESSURE: 130 MMHG | HEART RATE: 78 BPM

## 2018-09-06 DIAGNOSIS — I82.4Z9 DEEP VEIN THROMBOSIS (DVT) OF DISTAL VEIN OF LOWER EXTREMITY, UNSPECIFIED CHRONICITY, UNSPECIFIED LATERALITY (HCC): ICD-10-CM

## 2018-09-06 DIAGNOSIS — Z79.01 LONG TERM CURRENT USE OF ANTICOAGULANTS WITH INR GOAL OF 2.0-3.0: Primary | ICD-10-CM

## 2018-09-06 DIAGNOSIS — M70.21 OLECRANON BURSITIS OF RIGHT ELBOW: ICD-10-CM

## 2018-09-06 LAB — INR PPP: 4.1 (ref 0.9–1.1)

## 2018-09-06 PROCEDURE — 36416 COLLJ CAPILLARY BLOOD SPEC: CPT | Performed by: INTERNAL MEDICINE

## 2018-09-06 PROCEDURE — 99213 OFFICE O/P EST LOW 20 MIN: CPT | Performed by: INTERNAL MEDICINE

## 2018-09-06 PROCEDURE — 85610 PROTHROMBIN TIME: CPT | Performed by: INTERNAL MEDICINE

## 2018-09-06 NOTE — PROGRESS NOTES
Subjective   Narciso Romano is a 72 y.o. male.   Patient Coumadin dose 2.5/2.5/5 recent INR is high since been held for a few days INR still L4.1 takes this for DVT.  Also complains of right elbow pain from his Levaquin on bursitis versus hematoma from fall  History of Present Illness INR still elevated today  Was doing 2.5/2.5/5    inr 4.7 initially, now 4.1 today we'll have him hold Coumadin again today then begin 1.25/2.5/2.5 cycle with repeat INR on Monday available still bothering him he's on her from his referral to Dr. Martinez group regarding the olecranon bursitis versus hematoma from a fall is having some increased discomfort at elbow over the olecranon    Review of Systems   All other systems reviewed and are negative.      Objective   Vitals:    09/06/18 0756   BP: 130/78   Pulse: 78   Temp: 98.5 °F (36.9 °C)   SpO2: 94%   Weight: 70.8 kg (156 lb)     Physical Exam   Constitutional: He appears well-developed and well-nourished.   HENT:   Head: Normocephalic and atraumatic.   Cardiovascular: Normal rate, regular rhythm and normal heart sounds.    Pulmonary/Chest: Effort normal and breath sounds normal.   Musculoskeletal:   Bruising noted right arm primarily at elbow down right radial pulses are 2+.  Regarding olecranon bursa it is still swollen there is no increased heat or redness is busy discomfort still has good range of motion right elbow.  We will need to get to get a firm date for when patient sees his with [facility].  Already has disc of x-ray   Skin: Skin is warm and dry.   Nursing note and vitals reviewed.      Lab Results   Component Value Date    INR 4.10 (A) 09/06/2018    INR 4.70 (A) 09/04/2018    INR 2.50 (A) 08/07/2018       Procedures    Assessment/Plan 1.  DVT    2.  Anticoagulation with Coumadin plan Coumadin    0/1.25/2.5/2.5  inr Monday    3.  Olecranon bursitis right elbow with increased pain plan we will check with Dr. Martinez group to see when he has a scheduled  appointment.  And try to make it ref relatively timely    Much of this encounter note is an electronic transcription/translation of spoken language to printed text.  The electronic translation of spoken language may permit erroneous, or at times, nonsensical words or phrases to be inadvertently transcribed.  Although I have reviewed the note for such errors, some may still exist. If there are questions or for further clarification, please contact me.

## 2018-09-07 RX ORDER — NITROGLYCERIN 0.4 MG/1
TABLET SUBLINGUAL
Qty: 25 TABLET | Refills: 0 | Status: SHIPPED | OUTPATIENT
Start: 2018-09-07 | End: 2018-10-07 | Stop reason: SDUPTHER

## 2018-09-10 ENCOUNTER — OFFICE VISIT (OUTPATIENT)
Dept: FAMILY MEDICINE CLINIC | Facility: CLINIC | Age: 73
End: 2018-09-10

## 2018-09-10 VITALS
TEMPERATURE: 97.6 F | HEART RATE: 74 BPM | WEIGHT: 158.6 LBS | HEIGHT: 72 IN | DIASTOLIC BLOOD PRESSURE: 80 MMHG | OXYGEN SATURATION: 96 % | SYSTOLIC BLOOD PRESSURE: 138 MMHG | BODY MASS INDEX: 21.48 KG/M2

## 2018-09-10 DIAGNOSIS — Z79.01 LONG TERM CURRENT USE OF ANTICOAGULANTS WITH INR GOAL OF 2.0-3.0: ICD-10-CM

## 2018-09-10 DIAGNOSIS — E78.49 OTHER HYPERLIPIDEMIA: ICD-10-CM

## 2018-09-10 DIAGNOSIS — I82.4Z9 DEEP VEIN THROMBOSIS (DVT) OF DISTAL VEIN OF LOWER EXTREMITY, UNSPECIFIED CHRONICITY, UNSPECIFIED LATERALITY (HCC): Primary | ICD-10-CM

## 2018-09-10 LAB — INR PPP: 2.1 (ref 0.9–1.1)

## 2018-09-10 PROCEDURE — 36416 COLLJ CAPILLARY BLOOD SPEC: CPT | Performed by: INTERNAL MEDICINE

## 2018-09-10 PROCEDURE — 85610 PROTHROMBIN TIME: CPT | Performed by: INTERNAL MEDICINE

## 2018-09-10 PROCEDURE — 99213 OFFICE O/P EST LOW 20 MIN: CPT | Performed by: INTERNAL MEDICINE

## 2018-09-10 RX ORDER — ATORVASTATIN CALCIUM 10 MG/1
10 TABLET, FILM COATED ORAL DAILY
Qty: 30 TABLET | Refills: 0 | Status: SHIPPED | OUTPATIENT
Start: 2018-09-10 | End: 2018-10-07 | Stop reason: SDUPTHER

## 2018-09-10 NOTE — PROGRESS NOTES
Subjective   Narciso Romano is a 72 y.o. male.   Follow-up on DVT and an echo radiation with Coumadin no complaints.  Blood pressure satisfactory today of note patient is not on a statin I did not find any true drug allergies or side effects with that patient does not recall that either is definite heart disease  History of Present Illness has history of DVT chronic anticoagulation with Coumadin dosage is been relatively stable 2.5/2.5/5 recently  2.5/2.5/5  inr 1mo  is not recall prior statin intolerance or any memory of same.  With known coronary disease and a history of elevated lipids we will get updated liver and lipid values start patient on Lipitor 10 mg half tablet daily with follow-up lab work in 6 weeks' time patient's to take CoQ10 with this  Review of Systems   All other systems reviewed and are negative.      Objective   Vitals:    09/10/18 0811   BP: 138/80   Pulse: 74   Temp: 97.6 °F (36.4 °C)   SpO2: 96%   Weight: 71.9 kg (158 lb 9.6 oz)     Physical Exam   Constitutional: He appears well-developed and well-nourished.   HENT:   Head: Normocephalic and atraumatic.   Eyes: Pupils are equal, round, and reactive to light. Conjunctivae are normal.   Cardiovascular: Normal rate and regular rhythm.    Pulmonary/Chest: Effort normal and breath sounds normal.   Abdominal: Soft. Bowel sounds are normal.   Neurological: He is alert.   Relatively steady gait and station, unremarkable   Skin: Skin is warm and dry.   Nursing note and vitals reviewed.      Lab Results   Component Value Date    INR 2.10 (A) 09/10/2018    INR 4.10 (A) 09/06/2018    INR 4.70 (A) 09/04/2018       Procedures    Assessment/Plan   1.  DVT    2.  Anticoagulation with Coumadin target range 2.0-3.0 plan Coumadin 2.5/2.5/5 cycle INR 1 month    3.  Hyperlipidemia await lab initiate start him as statin Lipitor 10 mg as have tablet daily with follow-up with lab work 6 weeks.    Much of this encounter note is an electronic  transcription/translation of spoken language to printed text.  The electronic translation of spoken language may permit erroneous, or at times, nonsensical words or phrases to be inadvertently transcribed.  Although I have reviewed the note for such errors, some may still exist. If there are questions or for further clarification, please contact me.

## 2018-09-18 RX ORDER — TAMSULOSIN HYDROCHLORIDE 0.4 MG/1
1 CAPSULE ORAL NIGHTLY
COMMUNITY
End: 2019-06-14 | Stop reason: SDUPTHER

## 2018-09-18 NOTE — H&P
Provider: RUSSEL WESTON MD  HPI  Patient's here for recheck of his right elbow.  I tried to drain it almost a week ago was not successful.  He's now about 2 weeks out from his fall is having continued pain.  His difficulty sleeping because of the elbow pain.  Social history was automatically updated to reflect changes to the patient's age and marital status.      PCP Dr. SHAY CAN MD, [facility]    Physical Exam  Height:  72 in.    Weight:  156 lbs.     BMI:  21.23    Right Elbow  The olecranon bursa is visibly enlarged.  It is swollen and sore to palpation.  There is contusion and the patient's forearm all the way down to his hand.  The elbow shows limited extension or full flexion.      Imaging/Diagnostic Studies  No x-rays were obtained today.  Impression  Right elbow olecranon bursitis    Plan  An aspiration of the right elbow was performed under sterile conditions, 3 CC of bloody fluid was extracted.  I was not able to remove a significant amount of fluid.  The elbow remains swollen and painful.  I think he has a bloody effusion in the bursa has become thickened and cannot be removed with a needle.  We discussed possibly performing an excisional biopsy of the olecranon bursa at this remains symptomatic.  His now been 2 weeks and swelling has not decreased.  The patient has a cardiac defibrillator and is on Coumadin.  He's going to talk to his cardiologist today and if he gets clearance we may try to perform this Wednesday of next week.  This would be an excisional biopsy of the right olecranon bursa.        Please note:   Portions of this dictation were generated using voice recognition software. There may be grammatical, syntactical and/or typographical errors due to limitations inherent in this system.  Please contact the author with any questions regarding the content of this document.

## 2018-09-19 ENCOUNTER — ANESTHESIA (OUTPATIENT)
Dept: PERIOP | Facility: HOSPITAL | Age: 73
End: 2018-09-19

## 2018-09-19 ENCOUNTER — ANESTHESIA EVENT (OUTPATIENT)
Dept: PERIOP | Facility: HOSPITAL | Age: 73
End: 2018-09-19

## 2018-09-19 ENCOUNTER — HOSPITAL ENCOUNTER (OUTPATIENT)
Facility: HOSPITAL | Age: 73
Setting detail: HOSPITAL OUTPATIENT SURGERY
Discharge: HOME | End: 2018-09-19
Attending: ORTHOPAEDIC SURGERY | Admitting: ORTHOPAEDIC SURGERY

## 2018-09-19 VITALS
DIASTOLIC BLOOD PRESSURE: 80 MMHG | WEIGHT: 156.31 LBS | TEMPERATURE: 97 F | HEART RATE: 61 BPM | OXYGEN SATURATION: 96 % | RESPIRATION RATE: 20 BRPM | SYSTOLIC BLOOD PRESSURE: 127 MMHG | BODY MASS INDEX: 21.2 KG/M2

## 2018-09-19 DIAGNOSIS — M70.31 BURSITIS OF RIGHT ELBOW, UNSPECIFIED BURSA: ICD-10-CM

## 2018-09-19 LAB
ANION GAP SERPL CALCULATED.3IONS-SCNC: 8.7 MMOL/L
BASOPHILS # BLD AUTO: 0.03 10*3/MM3 (ref 0–0.2)
BASOPHILS NFR BLD AUTO: 0.5 % (ref 0–1.5)
BUN BLD-MCNC: 33 MG/DL (ref 8–23)
BUN/CREAT SERPL: 14 (ref 7–25)
CALCIUM SPEC-SCNC: 11.5 MG/DL (ref 8.6–10.5)
CHLORIDE SERPL-SCNC: 105 MMOL/L (ref 98–107)
CO2 SERPL-SCNC: 26.3 MMOL/L (ref 22–29)
CREAT BLD-MCNC: 2.35 MG/DL (ref 0.76–1.27)
DEPRECATED RDW RBC AUTO: 54.8 FL (ref 37–54)
EOSINOPHIL # BLD AUTO: 0.17 10*3/MM3 (ref 0–0.7)
EOSINOPHIL NFR BLD AUTO: 2.8 % (ref 0.3–6.2)
ERYTHROCYTE [DISTWIDTH] IN BLOOD BY AUTOMATED COUNT: 16.7 % (ref 11.5–14.5)
GFR SERPL CREATININE-BSD FRML MDRD: 33 ML/MIN/1.73
GLUCOSE BLD-MCNC: 86 MG/DL (ref 65–99)
HCT VFR BLD AUTO: 36.2 % (ref 40.4–52.2)
HGB BLD-MCNC: 11.4 G/DL (ref 13.7–17.6)
IMM GRANULOCYTES # BLD: 0.01 10*3/MM3 (ref 0–0.03)
IMM GRANULOCYTES NFR BLD: 0.2 % (ref 0–0.5)
INR PPP: 1.49 (ref 0.9–1.1)
LYMPHOCYTES # BLD AUTO: 1.4 10*3/MM3 (ref 0.9–4.8)
LYMPHOCYTES NFR BLD AUTO: 23.1 % (ref 19.6–45.3)
MCH RBC QN AUTO: 27.7 PG (ref 27–32.7)
MCHC RBC AUTO-ENTMCNC: 31.5 G/DL (ref 32.6–36.4)
MCV RBC AUTO: 88.1 FL (ref 79.8–96.2)
MONOCYTES # BLD AUTO: 0.26 10*3/MM3 (ref 0.2–1.2)
MONOCYTES NFR BLD AUTO: 4.3 % (ref 5–12)
NEUTROPHILS # BLD AUTO: 4.2 10*3/MM3 (ref 1.9–8.1)
NEUTROPHILS NFR BLD AUTO: 69.3 % (ref 42.7–76)
PLATELET # BLD AUTO: 263 10*3/MM3 (ref 140–500)
PMV BLD AUTO: 10.5 FL (ref 6–12)
POTASSIUM BLD-SCNC: 4.8 MMOL/L (ref 3.5–5.2)
PROTHROMBIN TIME: 17.7 SECONDS (ref 11.7–14.2)
RBC # BLD AUTO: 4.11 10*6/MM3 (ref 4.6–6)
SODIUM BLD-SCNC: 140 MMOL/L (ref 136–145)
WBC NRBC COR # BLD: 6.06 10*3/MM3 (ref 4.5–10.7)

## 2018-09-19 PROCEDURE — 25010000002 HYDROMORPHONE PER 4 MG: Performed by: NURSE ANESTHETIST, CERTIFIED REGISTERED

## 2018-09-19 PROCEDURE — 25010000002 FENTANYL CITRATE (PF) 100 MCG/2ML SOLUTION: Performed by: ANESTHESIOLOGY

## 2018-09-19 PROCEDURE — 25010000002 ONDANSETRON PER 1 MG: Performed by: NURSE ANESTHETIST, CERTIFIED REGISTERED

## 2018-09-19 PROCEDURE — 25010000002 FENTANYL CITRATE (PF) 100 MCG/2ML SOLUTION: Performed by: NURSE ANESTHETIST, CERTIFIED REGISTERED

## 2018-09-19 PROCEDURE — 25010000002 PROPOFOL 10 MG/ML EMULSION: Performed by: NURSE ANESTHETIST, CERTIFIED REGISTERED

## 2018-09-19 PROCEDURE — 80048 BASIC METABOLIC PNL TOTAL CA: CPT | Performed by: ORTHOPAEDIC SURGERY

## 2018-09-19 PROCEDURE — 93010 ELECTROCARDIOGRAM REPORT: CPT | Performed by: INTERNAL MEDICINE

## 2018-09-19 PROCEDURE — 85610 PROTHROMBIN TIME: CPT | Performed by: ORTHOPAEDIC SURGERY

## 2018-09-19 PROCEDURE — 93005 ELECTROCARDIOGRAM TRACING: CPT | Performed by: ORTHOPAEDIC SURGERY

## 2018-09-19 PROCEDURE — 88304 TISSUE EXAM BY PATHOLOGIST: CPT | Performed by: ORTHOPAEDIC SURGERY

## 2018-09-19 PROCEDURE — 85025 COMPLETE CBC W/AUTO DIFF WBC: CPT | Performed by: ORTHOPAEDIC SURGERY

## 2018-09-19 RX ORDER — PROMETHAZINE HYDROCHLORIDE 25 MG/ML
12.5 INJECTION, SOLUTION INTRAMUSCULAR; INTRAVENOUS ONCE AS NEEDED
Status: DISCONTINUED | OUTPATIENT
Start: 2018-09-19 | End: 2018-09-19 | Stop reason: HOSPADM

## 2018-09-19 RX ORDER — PROMETHAZINE HYDROCHLORIDE 25 MG/1
25 TABLET ORAL ONCE AS NEEDED
Status: DISCONTINUED | OUTPATIENT
Start: 2018-09-19 | End: 2018-09-19 | Stop reason: HOSPADM

## 2018-09-19 RX ORDER — OXYCODONE HYDROCHLORIDE AND ACETAMINOPHEN 5; 325 MG/1; MG/1
2 TABLET ORAL EVERY 4 HOURS PRN
Qty: 40 TABLET | Refills: 0 | Status: SHIPPED | OUTPATIENT
Start: 2018-09-19 | End: 2018-11-27 | Stop reason: SDUPTHER

## 2018-09-19 RX ORDER — CLINDAMYCIN PHOSPHATE 600 MG/50ML
600 INJECTION INTRAVENOUS EVERY 8 HOURS
Status: DISCONTINUED | OUTPATIENT
Start: 2018-09-19 | End: 2018-09-19 | Stop reason: HOSPADM

## 2018-09-19 RX ORDER — DIPHENHYDRAMINE HYDROCHLORIDE 50 MG/ML
12.5 INJECTION INTRAMUSCULAR; INTRAVENOUS
Status: DISCONTINUED | OUTPATIENT
Start: 2018-09-19 | End: 2018-09-19 | Stop reason: HOSPADM

## 2018-09-19 RX ORDER — LIDOCAINE HYDROCHLORIDE 20 MG/ML
INJECTION, SOLUTION INFILTRATION; PERINEURAL AS NEEDED
Status: DISCONTINUED | OUTPATIENT
Start: 2018-09-19 | End: 2018-09-19 | Stop reason: SURG

## 2018-09-19 RX ORDER — PROPOFOL 10 MG/ML
VIAL (ML) INTRAVENOUS AS NEEDED
Status: DISCONTINUED | OUTPATIENT
Start: 2018-09-19 | End: 2018-09-19 | Stop reason: SURG

## 2018-09-19 RX ORDER — ONDANSETRON 2 MG/ML
4 INJECTION INTRAMUSCULAR; INTRAVENOUS ONCE AS NEEDED
Status: DISCONTINUED | OUTPATIENT
Start: 2018-09-19 | End: 2018-09-19 | Stop reason: HOSPADM

## 2018-09-19 RX ORDER — FENTANYL CITRATE 50 UG/ML
50 INJECTION, SOLUTION INTRAMUSCULAR; INTRAVENOUS
Status: DISCONTINUED | OUTPATIENT
Start: 2018-09-19 | End: 2018-09-19 | Stop reason: HOSPADM

## 2018-09-19 RX ORDER — OXYCODONE AND ACETAMINOPHEN 7.5; 325 MG/1; MG/1
1 TABLET ORAL ONCE AS NEEDED
Status: COMPLETED | OUTPATIENT
Start: 2018-09-19 | End: 2018-09-19

## 2018-09-19 RX ORDER — EPHEDRINE SULFATE 50 MG/ML
5 INJECTION, SOLUTION INTRAVENOUS ONCE AS NEEDED
Status: DISCONTINUED | OUTPATIENT
Start: 2018-09-19 | End: 2018-09-19 | Stop reason: HOSPADM

## 2018-09-19 RX ORDER — FLUMAZENIL 0.1 MG/ML
0.2 INJECTION INTRAVENOUS AS NEEDED
Status: DISCONTINUED | OUTPATIENT
Start: 2018-09-19 | End: 2018-09-19 | Stop reason: HOSPADM

## 2018-09-19 RX ORDER — NALOXONE HCL 0.4 MG/ML
0.2 VIAL (ML) INJECTION AS NEEDED
Status: DISCONTINUED | OUTPATIENT
Start: 2018-09-19 | End: 2018-09-19 | Stop reason: HOSPADM

## 2018-09-19 RX ORDER — PROMETHAZINE HYDROCHLORIDE 25 MG/1
12.5 TABLET ORAL ONCE AS NEEDED
Status: DISCONTINUED | OUTPATIENT
Start: 2018-09-19 | End: 2018-09-19 | Stop reason: HOSPADM

## 2018-09-19 RX ORDER — SODIUM CHLORIDE, SODIUM LACTATE, POTASSIUM CHLORIDE, CALCIUM CHLORIDE 600; 310; 30; 20 MG/100ML; MG/100ML; MG/100ML; MG/100ML
9 INJECTION, SOLUTION INTRAVENOUS CONTINUOUS
Status: DISCONTINUED | OUTPATIENT
Start: 2018-09-19 | End: 2018-09-19 | Stop reason: HOSPADM

## 2018-09-19 RX ORDER — PROMETHAZINE HYDROCHLORIDE 25 MG/1
25 SUPPOSITORY RECTAL ONCE AS NEEDED
Status: DISCONTINUED | OUTPATIENT
Start: 2018-09-19 | End: 2018-09-19 | Stop reason: HOSPADM

## 2018-09-19 RX ORDER — SODIUM CHLORIDE 0.9 % (FLUSH) 0.9 %
1-10 SYRINGE (ML) INJECTION AS NEEDED
Status: DISCONTINUED | OUTPATIENT
Start: 2018-09-19 | End: 2018-09-19 | Stop reason: HOSPADM

## 2018-09-19 RX ORDER — HYDROCODONE BITARTRATE AND ACETAMINOPHEN 7.5; 325 MG/1; MG/1
1 TABLET ORAL ONCE AS NEEDED
Status: DISCONTINUED | OUTPATIENT
Start: 2018-09-19 | End: 2018-09-19 | Stop reason: HOSPADM

## 2018-09-19 RX ORDER — FAMOTIDINE 10 MG/ML
20 INJECTION, SOLUTION INTRAVENOUS ONCE
Status: COMPLETED | OUTPATIENT
Start: 2018-09-19 | End: 2018-09-19

## 2018-09-19 RX ORDER — LIDOCAINE HYDROCHLORIDE 10 MG/ML
0.5 INJECTION, SOLUTION EPIDURAL; INFILTRATION; INTRACAUDAL; PERINEURAL ONCE AS NEEDED
Status: COMPLETED | OUTPATIENT
Start: 2018-09-19 | End: 2018-09-19

## 2018-09-19 RX ORDER — EPHEDRINE SULFATE 50 MG/ML
INJECTION, SOLUTION INTRAVENOUS AS NEEDED
Status: DISCONTINUED | OUTPATIENT
Start: 2018-09-19 | End: 2018-09-19 | Stop reason: SURG

## 2018-09-19 RX ORDER — ONDANSETRON 2 MG/ML
INJECTION INTRAMUSCULAR; INTRAVENOUS AS NEEDED
Status: DISCONTINUED | OUTPATIENT
Start: 2018-09-19 | End: 2018-09-19 | Stop reason: SURG

## 2018-09-19 RX ORDER — LABETALOL HYDROCHLORIDE 5 MG/ML
5 INJECTION, SOLUTION INTRAVENOUS
Status: DISCONTINUED | OUTPATIENT
Start: 2018-09-19 | End: 2018-09-19 | Stop reason: HOSPADM

## 2018-09-19 RX ORDER — FENTANYL CITRATE 50 UG/ML
INJECTION, SOLUTION INTRAMUSCULAR; INTRAVENOUS AS NEEDED
Status: DISCONTINUED | OUTPATIENT
Start: 2018-09-19 | End: 2018-09-19 | Stop reason: SURG

## 2018-09-19 RX ADMIN — FENTANYL CITRATE 50 MCG: 50 INJECTION INTRAMUSCULAR; INTRAVENOUS at 15:00

## 2018-09-19 RX ADMIN — FENTANYL CITRATE 50 MCG: 50 INJECTION INTRAMUSCULAR; INTRAVENOUS at 14:50

## 2018-09-19 RX ADMIN — EPHEDRINE SULFATE 10 MG: 50 INJECTION INTRAMUSCULAR; INTRAVENOUS; SUBCUTANEOUS at 13:42

## 2018-09-19 RX ADMIN — SODIUM CHLORIDE, POTASSIUM CHLORIDE, SODIUM LACTATE AND CALCIUM CHLORIDE 9 ML/HR: 600; 310; 30; 20 INJECTION, SOLUTION INTRAVENOUS at 12:53

## 2018-09-19 RX ADMIN — ONDANSETRON 4 MG: 2 INJECTION INTRAMUSCULAR; INTRAVENOUS at 14:00

## 2018-09-19 RX ADMIN — Medication 10 ML: at 12:54

## 2018-09-19 RX ADMIN — FAMOTIDINE 20 MG: 10 INJECTION, SOLUTION INTRAVENOUS at 12:53

## 2018-09-19 RX ADMIN — OXYCODONE HYDROCHLORIDE AND ACETAMINOPHEN 1 TABLET: 7.5; 325 TABLET ORAL at 14:43

## 2018-09-19 RX ADMIN — HYDROMORPHONE HYDROCHLORIDE 0.5 MG: 1 INJECTION, SOLUTION INTRAMUSCULAR; INTRAVENOUS; SUBCUTANEOUS at 15:29

## 2018-09-19 RX ADMIN — LIDOCAINE HYDROCHLORIDE 0.5 ML: 10 INJECTION, SOLUTION EPIDURAL; INFILTRATION; INTRACAUDAL; PERINEURAL at 12:53

## 2018-09-19 RX ADMIN — FENTANYL CITRATE 50 MCG: 50 INJECTION INTRAMUSCULAR; INTRAVENOUS at 12:53

## 2018-09-19 RX ADMIN — SODIUM CHLORIDE, POTASSIUM CHLORIDE, SODIUM LACTATE AND CALCIUM CHLORIDE 9 ML/HR: 600; 310; 30; 20 INJECTION, SOLUTION INTRAVENOUS at 15:43

## 2018-09-19 RX ADMIN — FENTANYL CITRATE 25 MCG: 50 INJECTION INTRAMUSCULAR; INTRAVENOUS at 13:58

## 2018-09-19 RX ADMIN — PROPOFOL 120 MG: 10 INJECTION, EMULSION INTRAVENOUS at 13:32

## 2018-09-19 RX ADMIN — EPHEDRINE SULFATE 10 MG: 50 INJECTION INTRAMUSCULAR; INTRAVENOUS; SUBCUTANEOUS at 14:06

## 2018-09-19 RX ADMIN — PROPOFOL 30 MG: 10 INJECTION, EMULSION INTRAVENOUS at 13:33

## 2018-09-19 RX ADMIN — LIDOCAINE HYDROCHLORIDE 100 MG: 20 INJECTION, SOLUTION INFILTRATION; PERINEURAL at 13:32

## 2018-09-19 RX ADMIN — CLINDAMYCIN PHOSPHATE 600 MG: 12 INJECTION, SOLUTION INTRAVENOUS at 13:32

## 2018-09-19 NOTE — ANESTHESIA POSTPROCEDURE EVALUATION
Patient: Narciso Romano    Procedure Summary     Date:  09/19/18 Room / Location:   [facility]  [facility]    Anesthesia Start:  1327 Anesthesia Stop:  1421    Procedure:  EXCISION BIOPSY OF OLECRANNON BURSA RT ELBOW (Right Elbow) Diagnosis:      Surgeon:  Marcell Martinez MD Provider:  Gregory Kendall MD    Anesthesia Type:  general ASA Status:  3          Anesthesia Type: general  Last vitals  BP   154/80 (09/19/18 1430)   Temp   36.3 °C (97.3 °F) (09/19/18 1418)   Pulse   64 (09/19/18 1430)   Resp   16 (09/19/18 1430)     SpO2   97 % (09/19/18 1430)     Post Anesthesia Care and Evaluation    Patient location during evaluation: PACU  Patient participation: complete - patient participated  Level of consciousness: awake and alert  Pain score: 0  Pain management: adequate  Airway patency: patent  Anesthetic complications: No anesthetic complications    Cardiovascular status: acceptable  Respiratory status: acceptable  Hydration status: acceptable    Comments: /80   Pulse 64   Temp 36.3 °C (97.3 °F) (Temporal Artery )   Resp 16   Wt 70.9 kg (156 lb 4.9 oz)   SpO2 97%   BMI 21.20 kg/m²

## 2018-09-19 NOTE — ANESTHESIA PROCEDURE NOTES
Airway  Urgency: elective    Date/Time: 9/19/2018 1:35 PM  Airway not difficult    General Information and Staff    Patient location during procedure: OR  Anesthesiologist: LENNOX STRICKLAND  CRNA: MANJU PETERSON    Indications and Patient Condition  Indications for airway management: airway protection    Preoxygenated: yes  MILS maintained throughout  Mask difficulty assessment: 1 - vent by mask    Final Airway Details  Final airway type: supraglottic airway      Successful airway: classic  Size 5    Number of attempts at approach: 1    Additional Comments  Patient in OR. Monitors on. BLVS. Pre 02 100%. SIVI. LMA placed with ease. No leak present. BBS and ETCO2 present. Secured. Teeth/lips as preop.

## 2018-09-19 NOTE — ANESTHESIA PREPROCEDURE EVALUATION
Anesthesia Evaluation     Patient summary reviewed and Nursing notes reviewed   no history of anesthetic complications:  NPO Solid Status: > 8 hours  NPO Liquid Status: > 2 hours           Airway   Mallampati: II  TM distance: >3 FB  Neck ROM: full  no difficulty expected  Dental - normal exam     Pulmonary - normal exam   (+) a smoker Former, COPD mild,   (-) asthma, lung cancer  Cardiovascular - normal exam  Exercise tolerance: good (4-7 METS)    ECG reviewed  PT is on anticoagulation therapy  Patient on routine beta blocker and Beta blocker given within 24 hours of surgery  Rhythm: regular  Rate: normal    (+) pacemaker ICD, pacemaker interrogated unknown, hypertension well controlled 2 medications or greater, past MI  >12 months, CAD, DVT resolved, hyperlipidemia,   (-) dysrhythmias, cardiac stents    ROS comment: Known CAD s/p MI approximately 15yrs ago.  Has had a Alai AICD/pacer since that time.  Denies any CP/SOB.  States >4 MET's daily w/o difficulty.  EKG shows V paced rhythem with rate in the 50's.    Neuro/Psych- negative ROS  (-) seizures, TIA, CVA  GI/Hepatic/Renal/Endo    (+)  GERD well controlled,  hypothyroidism,   (-) hepatitis, liver disease, no renal disease, diabetes    Musculoskeletal (-) negative ROS    Abdominal  - normal exam   Substance History - negative use     OB/GYN negative ob/gyn ROS         Other - negative ROS                     Anesthesia Plan    ASA 3     general     intravenous induction   Anesthetic plan, all risks, benefits, and alternatives have been provided, discussed and informed consent has been obtained with: patient.    Plan discussed with CRNA and attending.

## 2018-09-20 LAB
CYTO UR: NORMAL
LAB AP CASE REPORT: NORMAL
PATH REPORT.FINAL DX SPEC: NORMAL
PATH REPORT.GROSS SPEC: NORMAL

## 2018-10-08 RX ORDER — ATORVASTATIN CALCIUM 10 MG/1
TABLET, FILM COATED ORAL
Qty: 30 TABLET | Refills: 0 | Status: SHIPPED | OUTPATIENT
Start: 2018-10-08 | End: 2019-01-11 | Stop reason: SDUPTHER

## 2018-10-08 RX ORDER — NITROGLYCERIN 0.4 MG/1
TABLET SUBLINGUAL
Qty: 25 TABLET | Refills: 0 | Status: SHIPPED | OUTPATIENT
Start: 2018-10-08 | End: 2019-03-15

## 2018-10-09 ENCOUNTER — OFFICE VISIT (OUTPATIENT)
Dept: FAMILY MEDICINE CLINIC | Facility: CLINIC | Age: 73
End: 2018-10-09

## 2018-10-09 VITALS
RESPIRATION RATE: 15 BRPM | SYSTOLIC BLOOD PRESSURE: 120 MMHG | HEIGHT: 72 IN | DIASTOLIC BLOOD PRESSURE: 80 MMHG | HEART RATE: 63 BPM | WEIGHT: 159 LBS | BODY MASS INDEX: 21.54 KG/M2 | TEMPERATURE: 97.8 F | OXYGEN SATURATION: 97 %

## 2018-10-09 DIAGNOSIS — I82.4Z9 DEEP VEIN THROMBOSIS (DVT) OF DISTAL VEIN OF LOWER EXTREMITY, UNSPECIFIED CHRONICITY, UNSPECIFIED LATERALITY (HCC): ICD-10-CM

## 2018-10-09 LAB — INR PPP: 3 (ref 0.9–1.1)

## 2018-10-09 PROCEDURE — 99213 OFFICE O/P EST LOW 20 MIN: CPT | Performed by: INTERNAL MEDICINE

## 2018-10-09 PROCEDURE — G0008 ADMIN INFLUENZA VIRUS VAC: HCPCS | Performed by: INTERNAL MEDICINE

## 2018-10-09 PROCEDURE — 90662 IIV NO PRSV INCREASED AG IM: CPT | Performed by: INTERNAL MEDICINE

## 2018-10-09 PROCEDURE — 36416 COLLJ CAPILLARY BLOOD SPEC: CPT | Performed by: INTERNAL MEDICINE

## 2018-10-09 PROCEDURE — 85610 PROTHROMBIN TIME: CPT | Performed by: INTERNAL MEDICINE

## 2018-10-09 NOTE — PROGRESS NOTES
Subjective   Narciso Romano is a 72 y.o. male.     History of Present Illness   Patient was seen today for DVT.  His INR was 3.0.  His Coumadin dose to half milligrams for 2 days and 5 mg the other 5 days.  Patient was given the option of a FACTOR V INHIBITOR.  He'll contact his insurance carrier to see if he wants to change.    Dictated utilizing Dragon dictation. If there are questions or for further clarification, please contact me.   The following portions of the patient's history were reviewed and updated as appropriate: allergies, current medications, past family history, past medical history, past social history, past surgical history and problem list.    Review of Systems   Constitutional: Negative for fatigue and fever.   HENT: Positive for congestion. Negative for trouble swallowing.    Eyes: Negative for discharge and visual disturbance.   Respiratory: Negative for choking and shortness of breath.    Cardiovascular: Negative for chest pain and palpitations.   Gastrointestinal: Negative for abdominal pain and blood in stool.   Endocrine: Negative.    Genitourinary: Negative for genital sores and hematuria.   Musculoskeletal: Negative for gait problem and joint swelling.   Skin: Negative for color change, pallor, rash and wound.   Allergic/Immunologic: Positive for environmental allergies. Negative for immunocompromised state.   Neurological: Negative for facial asymmetry and speech difficulty.   Psychiatric/Behavioral: Negative for hallucinations and suicidal ideas.       Objective   Physical Exam   Constitutional: He is oriented to person, place, and time. He appears well-developed and well-nourished.   HENT:   Head: Normocephalic.   Eyes: Pupils are equal, round, and reactive to light. Conjunctivae are normal.   Neck: Normal range of motion. Neck supple.   Cardiovascular: Normal rate, regular rhythm and normal heart sounds.    Pulmonary/Chest: Effort normal and breath sounds normal.   Abdominal: Soft.  Bowel sounds are normal.   Musculoskeletal: Normal range of motion.   Neurological: He is alert and oriented to person, place, and time.   Skin: Skin is warm and dry.   Psychiatric: He has a normal mood and affect. His behavior is normal. Judgment and thought content normal.   Nursing note and vitals reviewed.      Assessment/Plan   Problems Addressed this Visit        Cardiovascular and Mediastinum    DVT (deep venous thrombosis) (CMS/[facility])    Relevant Orders    POC INR (Completed)

## 2018-11-06 ENCOUNTER — OFFICE VISIT (OUTPATIENT)
Dept: FAMILY MEDICINE CLINIC | Facility: CLINIC | Age: 73
End: 2018-11-06

## 2018-11-06 VITALS
BODY MASS INDEX: 21.64 KG/M2 | WEIGHT: 159.8 LBS | TEMPERATURE: 97.6 F | SYSTOLIC BLOOD PRESSURE: 118 MMHG | DIASTOLIC BLOOD PRESSURE: 86 MMHG | HEIGHT: 72 IN | OXYGEN SATURATION: 93 % | HEART RATE: 62 BPM

## 2018-11-06 DIAGNOSIS — I82.4Z9 DEEP VEIN THROMBOSIS (DVT) OF DISTAL VEIN OF LOWER EXTREMITY, UNSPECIFIED CHRONICITY, UNSPECIFIED LATERALITY (HCC): ICD-10-CM

## 2018-11-06 DIAGNOSIS — Z79.01 LONG TERM CURRENT USE OF ANTICOAGULANTS WITH INR GOAL OF 2.0-3.0: Primary | ICD-10-CM

## 2018-11-06 DIAGNOSIS — I10 HYPERTENSION, ESSENTIAL: ICD-10-CM

## 2018-11-06 LAB — INR PPP: 1.9 (ref 0.9–1.1)

## 2018-11-06 PROCEDURE — 36416 COLLJ CAPILLARY BLOOD SPEC: CPT | Performed by: INTERNAL MEDICINE

## 2018-11-06 PROCEDURE — 99213 OFFICE O/P EST LOW 20 MIN: CPT | Performed by: INTERNAL MEDICINE

## 2018-11-06 PROCEDURE — 85610 PROTHROMBIN TIME: CPT | Performed by: INTERNAL MEDICINE

## 2018-11-06 RX ORDER — AMOXICILLIN 500 MG/1
CAPSULE ORAL
Refills: 0 | COMMUNITY
Start: 2018-10-23 | End: 2018-12-21

## 2018-11-06 RX ORDER — FLUCONAZOLE 200 MG/1
200 TABLET ORAL
Refills: 1 | COMMUNITY
Start: 2018-10-13 | End: 2019-02-18 | Stop reason: SDUPTHER

## 2018-11-06 NOTE — PROGRESS NOTES
Subjective   Narciso Romano is a 72 y.o. male.   Follow-up on INR for DVT patient stopped for several days for dental work be done his restarted for approximately week's time no dietary indiscretions reported  History of Present Illness chronic anticoagulation with Coumadin for DVT.  Stopped Coumadin for 3d for dental  Restarted coumadin 1wk abo patient still having pain with right elbow did have Surgery for right elbow pain still having pain does request pain medicine for this.  He did not have been under contract with for this and discussed that he would like to get this from his orthopedic surgeon since it would be for postop pain.  Recheck with them first overall doing fairly well no chest pain complaints patient's known COPD patient blood pressure appears be satisfactory day as well.  Review of Systems   All other systems reviewed and are negative.      Objective   Vitals:    11/06/18 0803   BP: 118/86   Pulse: 62   Temp: 97.6 °F (36.4 °C)   SpO2: 93%   Weight: 72.5 kg (159 lb 12.8 oz)     Physical Exam   Constitutional: He appears well-developed and well-nourished.   HENT:   Head: Normocephalic and atraumatic.   Cardiovascular: Normal rate, regular rhythm and normal heart sounds.    Pulmonary/Chest: Effort normal and breath sounds normal.   Nursing note and vitals reviewed.      Lab Results   Component Value Date    INR 1.90 (A) 11/06/2018    INR 3.00 (A) 10/09/2018    INR 1.49 (H) 09/19/2018       Procedures    Assessment/Plan 1.  DVT    2.  Anticoagulation with Coumadin target range 2.0-3.0 plan Coumadin    Fu 3wks  Cont 5/2.5/2.5/milligrams Coumadin per day is three-day cycle    3.  Right elbow pain status post surgery for same plan follow-up with Dr. Manrique or contact him regarding ongoing pain medication    4.  Hypertension satisfactory day continue present medicine     Much of this encounter note is an electronic transcription/translation of spoken language to printed text.  The electronic translation  of spoken language may permit erroneous, or at times, nonsensical words or phrases to be inadvertently transcribed.  Although I have reviewed the note for such errors, some may still exist. If there are questions or for further clarification, please contact me.

## 2018-11-27 ENCOUNTER — OFFICE VISIT (OUTPATIENT)
Dept: FAMILY MEDICINE CLINIC | Facility: CLINIC | Age: 73
End: 2018-11-27

## 2018-11-27 VITALS
SYSTOLIC BLOOD PRESSURE: 150 MMHG | OXYGEN SATURATION: 98 % | HEIGHT: 72 IN | BODY MASS INDEX: 22.05 KG/M2 | TEMPERATURE: 97.8 F | DIASTOLIC BLOOD PRESSURE: 100 MMHG | WEIGHT: 162.8 LBS | HEART RATE: 66 BPM

## 2018-11-27 DIAGNOSIS — I82.4Z9 DEEP VEIN THROMBOSIS (DVT) OF DISTAL VEIN OF LOWER EXTREMITY, UNSPECIFIED CHRONICITY, UNSPECIFIED LATERALITY (HCC): Primary | ICD-10-CM

## 2018-11-27 DIAGNOSIS — Z79.01 LONG TERM CURRENT USE OF ANTICOAGULANTS WITH INR GOAL OF 2.0-3.0: ICD-10-CM

## 2018-11-27 DIAGNOSIS — J34.89 SINUS DRAINAGE: ICD-10-CM

## 2018-11-27 LAB — INR PPP: 3 (ref 0.9–1.1)

## 2018-11-27 PROCEDURE — 99213 OFFICE O/P EST LOW 20 MIN: CPT | Performed by: INTERNAL MEDICINE

## 2018-11-27 PROCEDURE — 85610 PROTHROMBIN TIME: CPT | Performed by: INTERNAL MEDICINE

## 2018-11-27 PROCEDURE — 36416 COLLJ CAPILLARY BLOOD SPEC: CPT | Performed by: INTERNAL MEDICINE

## 2018-11-27 RX ORDER — AMOXICILLIN 875 MG/1
875 TABLET, COATED ORAL 2 TIMES DAILY
Qty: 20 TABLET | Refills: 0 | Status: SHIPPED | OUTPATIENT
Start: 2018-11-27 | End: 2018-12-07

## 2018-11-27 NOTE — PROGRESS NOTES
Subjective   Narciso Romano is a 73 y.o. male.   Patient with DVT long-term anticoagulation for same  History of Present Illness   5/2.5/5/2.5 his current Coumadin dose for his DVT prophylaxis with Coumadin patient also has increased sinus drainage concern might be beginning his satisfaction no major pressure the fascia we will prescribe amoxicillin in case this does worsen he is to hold off and uses judgment on that until hospital comes a sinus infection no other complaints this point.  We will recheck blood pressure on follow-up  Review of Systems   All other systems reviewed and are negative.      Objective   Vitals:    11/27/18 0800   BP: 150/100   Pulse: 66   Temp: 97.8 °F (36.6 °C)   SpO2: 98%   Weight: 73.8 kg (162 lb 12.8 oz)     Physical Exam   Constitutional: He appears well-developed and well-nourished.   HENT:   Head: Normocephalic and atraumatic.   Right Ear: External ear normal.   Left Ear: External ear normal.   Mouth/Throat: Oropharynx is clear and moist.   Eyes: Pupils are equal, round, and reactive to light.   Cardiovascular: Normal rate, regular rhythm and normal heart sounds.   Pulmonary/Chest: Effort normal and breath sounds normal.       Lab Results   Component Value Date    INR 3.00 (A) 11/27/2018    INR 1.90 (A) 11/06/2018    INR 3.00 (A) 10/09/2018       Procedures    Assessment/Plan   1.  DVT    2.  Anticoagulation with Coumadin target range 2.05 3.0 plan continue 5/2.5/5/2.5 cycle follow-up INR in one month's time    3.  Sinus drainage plan prescription amoxicillin 875 twice a day for 10 days' time to be taken only if sinus infection worsens becomes a cloudy drainage amount of clear drainage as is this point        Much of this encounter note is an electronic transcription/translation of spoken language to printed text.  The electronic translation of spoken language may permit erroneous, or at times, nonsensical words or phrases to be inadvertently transcribed.  Although I have reviewed  the note for such errors, some may still exist. If there are questions or for further clarification, please contact me.

## 2018-12-21 ENCOUNTER — OFFICE VISIT (OUTPATIENT)
Dept: FAMILY MEDICINE CLINIC | Facility: CLINIC | Age: 73
End: 2018-12-21

## 2018-12-21 VITALS
OXYGEN SATURATION: 93 % | DIASTOLIC BLOOD PRESSURE: 80 MMHG | SYSTOLIC BLOOD PRESSURE: 110 MMHG | HEIGHT: 72 IN | TEMPERATURE: 96.8 F | HEART RATE: 66 BPM | WEIGHT: 164 LBS | BODY MASS INDEX: 22.21 KG/M2

## 2018-12-21 DIAGNOSIS — Z79.01 LONG TERM CURRENT USE OF ANTICOAGULANTS WITH INR GOAL OF 2.0-3.0: ICD-10-CM

## 2018-12-21 DIAGNOSIS — J20.9 BRONCHITIS WITH BRONCHOSPASM: Primary | ICD-10-CM

## 2018-12-21 DIAGNOSIS — J32.0 MAXILLARY SINUSITIS, UNSPECIFIED CHRONICITY: ICD-10-CM

## 2018-12-21 DIAGNOSIS — I82.4Z9 DEEP VEIN THROMBOSIS (DVT) OF DISTAL VEIN OF LOWER EXTREMITY, UNSPECIFIED CHRONICITY, UNSPECIFIED LATERALITY (HCC): ICD-10-CM

## 2018-12-21 LAB — INR PPP: 2.1 (ref 0.9–1.1)

## 2018-12-21 PROCEDURE — 99214 OFFICE O/P EST MOD 30 MIN: CPT | Performed by: INTERNAL MEDICINE

## 2018-12-21 PROCEDURE — 36416 COLLJ CAPILLARY BLOOD SPEC: CPT | Performed by: INTERNAL MEDICINE

## 2018-12-21 PROCEDURE — 85610 PROTHROMBIN TIME: CPT | Performed by: INTERNAL MEDICINE

## 2018-12-21 PROCEDURE — 71046 X-RAY EXAM CHEST 2 VIEWS: CPT | Performed by: INTERNAL MEDICINE

## 2018-12-21 RX ORDER — BENZONATATE 100 MG/1
CAPSULE ORAL
Qty: 30 CAPSULE | Refills: 1 | Status: SHIPPED | OUTPATIENT
Start: 2018-12-21 | End: 2019-02-18 | Stop reason: SDUPTHER

## 2018-12-21 RX ORDER — ALBUTEROL SULFATE 90 UG/1
2 AEROSOL, METERED RESPIRATORY (INHALATION) EVERY 4 HOURS PRN
Qty: 1 INHALER | Refills: 1 | Status: SHIPPED | OUTPATIENT
Start: 2018-12-21 | End: 2019-01-08

## 2018-12-21 RX ORDER — PREDNISONE 10 MG/1
10 TABLET ORAL DAILY
Qty: 20 TABLET | Refills: 0 | Status: SHIPPED | OUTPATIENT
Start: 2018-12-21 | End: 2019-02-18 | Stop reason: SDUPTHER

## 2018-12-21 RX ORDER — CLINDAMYCIN HYDROCHLORIDE 300 MG/1
300 CAPSULE ORAL 3 TIMES DAILY
Qty: 30 CAPSULE | Refills: 0 | Status: SHIPPED | OUTPATIENT
Start: 2018-12-21 | End: 2019-02-18 | Stop reason: SDUPTHER

## 2018-12-21 NOTE — PROGRESS NOTES
Subjective   Narciso Romano is a 73 y.o. male.   Patient with cough increased sputum no reported temperature but definitely wheezing also follow-up on his INR which takes for DVT sinuses draining as well only drainage from sinuses some pressure in face more so over maxillary sinus area.  History of Present Illness cough increased sputum wheezing has known COPD is definitely wheezing some coughing generally feels bad no reported temperature.  We will proceed with chest x-ray does include pneumonia with him compliant with his Coumadin dose INR days quite satisfactory at 2.1  2.5/2.5/5 cycle is his current cycle of Coumadin continue same  Regardless include Keflex.  And Cipro.  Family history for hypertension heart disease and kidney disease.  Patient was a former smoker no alcohol.  Review of Systems   All other systems reviewed and are negative.      Objective   Vitals:    12/21/18 0801   BP: 110/80   Pulse: 66   Temp: 96.8 °F (36 °C)   SpO2: 93%   Weight: 74.4 kg (164 lb)     Physical Exam   Constitutional: He appears well-developed and well-nourished.   HENT:   Head: Normocephalic and atraumatic.   Right Ear: External ear normal.   Left Ear: External ear normal.   Mild pressure over maxillary sinuses   Eyes: Conjunctivae are normal. Pupils are equal, round, and reactive to light.   Cardiovascular: Normal rate, regular rhythm and normal heart sounds.   Pulmonary/Chest: Effort normal and breath sounds normal.   Neurological: He is alert.   Unremarkable slightly slow gait and station   Skin: Skin is warm and dry.   Psychiatric: He has a normal mood and affect. His behavior is normal.       Lab Results   Component Value Date    INR 2.10 (A) 12/21/2018    INR 3.00 (A) 11/27/2018    INR 1.90 (A) 11/06/2018       Procedures  Chest x-ray PA and lateral obtained.  No definite active parenchymal infiltrates seen.  Pacemaker is present.  On lateral view there is questionable increased density right lower lung field at the  lower T-spine area cannot exclude super early infiltrate, have radiology review this..  Do have a comparison chest x-ray from 10/9/17 without any clear-cut changes.  No other bony or soft tissue abnormalities are noted.  Assessment/Plan   1.  Bronchitis with bronchospasm plan mPrednisone 40 mg ×2 days, 30 mg ×2 days, 20 mg ×2 days, 10 mg ×2 days.,  Tessalon, albuterol HFA, Dulera 200, clindamycin 300 3 times a day for 10 days follow-up if not well in 10 days' time    2.  Maxillary sinusitis plan clindamycin should suffice    3.  DVT    4.  Anticoagulation with Coumadin target range 2.0-3.0, plan continue present 2.5/2.5/5 three-day cycle follow-up INR in 1 month

## 2018-12-26 ENCOUNTER — TELEPHONE (OUTPATIENT)
Dept: FAMILY MEDICINE CLINIC | Facility: CLINIC | Age: 73
End: 2018-12-26

## 2018-12-31 DIAGNOSIS — N18.9 CHRONIC KIDNEY DISEASE, UNSPECIFIED CKD STAGE: Primary | ICD-10-CM

## 2019-01-02 RX ORDER — CLINDAMYCIN HYDROCHLORIDE 300 MG/1
300 CAPSULE ORAL 3 TIMES DAILY
Qty: 30 CAPSULE | Refills: 0 | OUTPATIENT
Start: 2019-01-02

## 2019-01-04 ENCOUNTER — HOSPITAL ENCOUNTER (OUTPATIENT)
Dept: CT IMAGING | Facility: HOSPITAL | Age: 74
Discharge: HOME | End: 2019-01-04
Attending: INTERNAL MEDICINE | Admitting: INTERNAL MEDICINE

## 2019-01-04 DIAGNOSIS — N18.9 CHRONIC KIDNEY DISEASE, UNSPECIFIED CKD STAGE: ICD-10-CM

## 2019-01-04 PROCEDURE — 71250 CT THORAX DX C-: CPT

## 2019-01-08 ENCOUNTER — OFFICE VISIT (OUTPATIENT)
Dept: FAMILY MEDICINE CLINIC | Facility: CLINIC | Age: 74
End: 2019-01-08

## 2019-01-08 VITALS
WEIGHT: 163 LBS | OXYGEN SATURATION: 95 % | HEART RATE: 80 BPM | SYSTOLIC BLOOD PRESSURE: 120 MMHG | TEMPERATURE: 97.8 F | BODY MASS INDEX: 22.08 KG/M2 | DIASTOLIC BLOOD PRESSURE: 70 MMHG | HEIGHT: 72 IN

## 2019-01-08 DIAGNOSIS — J18.9 PNEUMONITIS: Primary | ICD-10-CM

## 2019-01-08 DIAGNOSIS — I82.4Z9 DEEP VEIN THROMBOSIS (DVT) OF DISTAL VEIN OF LOWER EXTREMITY, UNSPECIFIED CHRONICITY, UNSPECIFIED LATERALITY (HCC): ICD-10-CM

## 2019-01-08 DIAGNOSIS — E78.49 OTHER HYPERLIPIDEMIA: ICD-10-CM

## 2019-01-08 DIAGNOSIS — R53.83 FATIGUE, UNSPECIFIED TYPE: ICD-10-CM

## 2019-01-08 DIAGNOSIS — Z79.01 LONG TERM CURRENT USE OF ANTICOAGULANTS WITH INR GOAL OF 2.0-3.0: ICD-10-CM

## 2019-01-08 LAB
ALBUMIN SERPL-MCNC: 4.4 G/DL (ref 3.5–5.2)
ALBUMIN/GLOB SERPL: 1.5 G/DL
ALP SERPL-CCNC: 48 U/L (ref 39–117)
ALT SERPL W P-5'-P-CCNC: 16 U/L (ref 1–41)
ANION GAP SERPL CALCULATED.3IONS-SCNC: 11.8 MMOL/L
AST SERPL-CCNC: 30 U/L (ref 1–40)
BILIRUB SERPL-MCNC: 0.4 MG/DL (ref 0.1–1.2)
BUN BLD-MCNC: 28 MG/DL (ref 8–23)
BUN/CREAT SERPL: 13 (ref 7–25)
CALCIUM SPEC-SCNC: 11.4 MG/DL (ref 8.6–10.5)
CHLORIDE SERPL-SCNC: 102 MMOL/L (ref 98–107)
CHOLEST SERPL-MCNC: 302 MG/DL (ref 0–200)
CO2 SERPL-SCNC: 26.2 MMOL/L (ref 22–29)
CREAT BLD-MCNC: 2.16 MG/DL (ref 0.76–1.27)
ERYTHROCYTE [DISTWIDTH] IN BLOOD BY AUTOMATED COUNT: 18 % (ref 4.5–15)
GFR SERPL CREATININE-BSD FRML MDRD: 37 ML/MIN/1.73
GLOBULIN UR ELPH-MCNC: 2.9 GM/DL
GLUCOSE BLD-MCNC: 79 MG/DL (ref 65–99)
HCT VFR BLD AUTO: 36.6 % (ref 35–60)
HDLC SERPL-MCNC: 88 MG/DL (ref 40–60)
HGB BLD-MCNC: 11.8 G/DL (ref 13.5–18)
INR PPP: 1.5 (ref 0.9–1.1)
LDLC SERPL CALC-MCNC: 181 MG/DL (ref 0–100)
LDLC/HDLC SERPL: 2.05 {RATIO}
LYMPHOCYTES # BLD AUTO: 1.2 10*3/MM3 (ref 1.2–3.4)
LYMPHOCYTES NFR BLD AUTO: 19.2 % (ref 21–51)
MCH RBC QN AUTO: 28.4 PG (ref 26.1–33.1)
MCHC RBC AUTO-ENTMCNC: 32.2 G/DL (ref 33–37)
MCV RBC AUTO: 88 FL (ref 80–99)
MONOCYTES # BLD AUTO: 0.4 10*3/MM3 (ref 0.1–0.6)
MONOCYTES NFR BLD AUTO: 6.1 % (ref 2–9)
NEUTROPHILS # BLD AUTO: 4.9 10*3/MM3 (ref 1.4–6.5)
NEUTROPHILS NFR BLD AUTO: 74.7 % (ref 42–75)
PLATELET # BLD AUTO: 239 10*3/MM3 (ref 150–450)
PMV BLD AUTO: 7.8 FL (ref 7.1–10.5)
POTASSIUM BLD-SCNC: 4.8 MMOL/L (ref 3.5–5.2)
PROT SERPL-MCNC: 7.3 G/DL (ref 6–8.5)
RBC # BLD AUTO: 4.16 10*6/MM3 (ref 4–6)
SODIUM BLD-SCNC: 140 MMOL/L (ref 136–145)
TRIGL SERPL-MCNC: 166 MG/DL (ref 0–150)
VLDLC SERPL-MCNC: 33.2 MG/DL (ref 5–40)
WBC NRBC COR # BLD: 6.5 10*3/MM3 (ref 4.5–10)

## 2019-01-08 PROCEDURE — 80061 LIPID PANEL: CPT | Performed by: INTERNAL MEDICINE

## 2019-01-08 PROCEDURE — 80053 COMPREHEN METABOLIC PANEL: CPT | Performed by: INTERNAL MEDICINE

## 2019-01-08 PROCEDURE — 36416 COLLJ CAPILLARY BLOOD SPEC: CPT | Performed by: INTERNAL MEDICINE

## 2019-01-08 PROCEDURE — 36415 COLL VENOUS BLD VENIPUNCTURE: CPT | Performed by: INTERNAL MEDICINE

## 2019-01-08 PROCEDURE — 85025 COMPLETE CBC W/AUTO DIFF WBC: CPT | Performed by: INTERNAL MEDICINE

## 2019-01-08 PROCEDURE — 85610 PROTHROMBIN TIME: CPT | Performed by: INTERNAL MEDICINE

## 2019-01-08 PROCEDURE — 99214 OFFICE O/P EST MOD 30 MIN: CPT | Performed by: INTERNAL MEDICINE

## 2019-01-08 NOTE — PROGRESS NOTES
Subjective   Narciso Romano is a 73 y.o. male.   Follow-up on pneumonia status post hospitalization.  On Coumadin for DVT as above had antibiotics which he has completed  History of Present Illness     Finished antibiotics for pneumonia we'll check INR today for his DVT is well.  Doing fairly well complete antibiotics no sputum production no shortness of breath or increased temperature but does feel somewhat tired less energy status post his recent pneumonia.  Slightly more increased sleep no reported temperature blood pressures been satisfactory as is no chest pain complaints  Drug allergies  include Keflex and Cipro.    Family history for hypertension heart disease thyroid disease and kidney trouble.  Patient's former smoker no heavy alcohol.  Review of Systems   All other systems reviewed and are negative.      Objective   Vitals:    01/08/19 1106   BP: 120/70   Pulse: 80   Temp: 97.8 °F (36.6 °C)   SpO2: 95%   Weight: 73.9 kg (163 lb)     Physical Exam   Constitutional: He appears well-developed and well-nourished.   HENT:   Head: Normocephalic and atraumatic.   Eyes: Conjunctivae are normal. Pupils are equal, round, and reactive to light.   Cardiovascular: Normal rate, regular rhythm and normal heart sounds.   Pulmonary/Chest: Effort normal and breath sounds normal.   Slightly decreased breath sounds but no rales or wheezes heard   Abdominal: Soft. Bowel sounds are normal.   Neurological: He is alert.   Somewhat slow but stable gait and station   Skin: Skin is warm and dry.   Nursing note and vitals reviewed.      Lab Results   Component Value Date    INR 1.50 (A) 01/08/2019    INR 2.10 (A) 12/21/2018    INR 3.00 (A) 11/27/2018       Procedures    Assessment/Plan   1.  Pneumonitis plan status post treatment continue follow-up with INR approximately 1 month patient did have a CT the chest to look for pneumonia evidence of COPD tortuous aorta plan x-ray reading by radiologist did show a right lung infiltrate.   CT evidence several days later did not show evidence of pneumonia presumably resolution right lung pneumonia.  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    2.  DVT    3.  Anticoagulation with Coumadin target range 2.0-3.0 plan patient needs to void vitamin K rich foods such as CABG in [employer] he was in joining with repeat INR 2 weeks time continue present dose    4.  Fatigue unspecified plan await pending lab further recommendations to follow did discuss post pneumonia fatigue syndrome with patient although we cannot leave him such right now    5.  Hyperlipidemia plan await pending lab follow-up in 6 months if satisfactory    Much of this encounter note is an electronic transcription/translation of spoken language to printed text.  The electronic translation of spoken language may permit erroneous, or at times, nonsensical words or phrases to be inadvertently transcribed.  Although I have reviewed the note for such errors, some may still exist. If there are questions or for further clarification, please contact me.

## 2019-01-10 ENCOUNTER — TELEPHONE (OUTPATIENT)
Dept: FAMILY MEDICINE CLINIC | Facility: CLINIC | Age: 74
End: 2019-01-10

## 2019-01-10 NOTE — TELEPHONE ENCOUNTER
I'm not sure I know what that was.  I felt the patient had completed his course of antibiotics so as not going to give additional antibiotics.  Please check with patient see what else might be

## 2019-01-11 DIAGNOSIS — E78.5 HYPERLIPIDEMIA, UNSPECIFIED HYPERLIPIDEMIA TYPE: Primary | ICD-10-CM

## 2019-01-11 RX ORDER — AZELASTINE 1 MG/ML
2 SPRAY, METERED NASAL 2 TIMES DAILY
Qty: 1 EACH | Refills: 12 | Status: SHIPPED | OUTPATIENT
Start: 2019-01-11 | End: 2021-01-01 | Stop reason: SDUPTHER

## 2019-01-11 RX ORDER — ATORVASTATIN CALCIUM 20 MG/1
20 TABLET, FILM COATED ORAL DAILY
Qty: 30 TABLET | Refills: 1 | Status: SHIPPED | OUTPATIENT
Start: 2019-01-11

## 2019-01-21 ENCOUNTER — OFFICE VISIT (OUTPATIENT)
Dept: FAMILY MEDICINE CLINIC | Facility: CLINIC | Age: 74
End: 2019-01-21

## 2019-01-21 VITALS
BODY MASS INDEX: 22.16 KG/M2 | OXYGEN SATURATION: 98 % | HEIGHT: 72 IN | WEIGHT: 163.6 LBS | TEMPERATURE: 97.6 F | DIASTOLIC BLOOD PRESSURE: 90 MMHG | SYSTOLIC BLOOD PRESSURE: 150 MMHG | HEART RATE: 76 BPM

## 2019-01-21 DIAGNOSIS — J01.00 ACUTE MAXILLARY SINUSITIS, RECURRENCE NOT SPECIFIED: ICD-10-CM

## 2019-01-21 DIAGNOSIS — I82.4Z9 DEEP VEIN THROMBOSIS (DVT) OF DISTAL VEIN OF LOWER EXTREMITY, UNSPECIFIED CHRONICITY, UNSPECIFIED LATERALITY (HCC): Primary | ICD-10-CM

## 2019-01-21 DIAGNOSIS — H66.002 ACUTE SUPPURATIVE OTITIS MEDIA OF LEFT EAR WITHOUT SPONTANEOUS RUPTURE OF TYMPANIC MEMBRANE, RECURRENCE NOT SPECIFIED: ICD-10-CM

## 2019-01-21 DIAGNOSIS — Z79.01 LONG TERM CURRENT USE OF ANTICOAGULANTS WITH INR GOAL OF 2.0-3.0: ICD-10-CM

## 2019-01-21 LAB — INR PPP: 2.1 (ref 0.9–1.1)

## 2019-01-21 PROCEDURE — 85610 PROTHROMBIN TIME: CPT | Performed by: INTERNAL MEDICINE

## 2019-01-21 PROCEDURE — 99213 OFFICE O/P EST LOW 20 MIN: CPT | Performed by: INTERNAL MEDICINE

## 2019-01-21 PROCEDURE — 36416 COLLJ CAPILLARY BLOOD SPEC: CPT | Performed by: INTERNAL MEDICINE

## 2019-01-21 RX ORDER — AMOXICILLIN 875 MG/1
875 TABLET, COATED ORAL 2 TIMES DAILY
Qty: 20 TABLET | Refills: 0 | Status: SHIPPED | OUTPATIENT
Start: 2019-01-21 | End: 2019-01-31

## 2019-01-21 NOTE — PROGRESS NOTES
Subjective   Narciso Romano is a 73 y.o. male.   Coumadin for Chronic DVT.  Is Doing Fairly Well but Having Some Sinus Congestion  History of Present Illness DVT, Coumadin for same compliant with Coumadin regimen INR today is satisfactory.  Patient also is having some sinus drainage which been watched certainly survive whitish drainage persistent has reduced hearing on physical exam sent have a left otitis media as well.  Can tolerate penicillin but cannot tolerate cephalosporins.  5/2.5/2.5  inr 1mo    Review of Systems   All other systems reviewed and are negative.      Objective   Vitals:    01/21/19 0808   BP: 150/90   Pulse: 76   Temp: 97.6 °F (36.4 °C)   SpO2: 98%   Weight: 74.2 kg (163 lb 9.6 oz)     Physical Exam   Constitutional: He appears well-developed and well-nourished.   HENT:   Head: Normocephalic and atraumatic.   Right Ear: External ear normal.   Mouth/Throat: Oropharynx is clear and moist.   Left TM is dull consistent with otitis media   Eyes: Conjunctivae are normal. Pupils are equal, round, and reactive to light.   Cardiovascular: Normal rate, regular rhythm and normal heart sounds.   Pulmonary/Chest: Effort normal and breath sounds normal.   Nursing note and vitals reviewed.      Lab Results   Component Value Date    INR 2.10 (A) 01/21/2019    INR 1.50 (A) 01/08/2019    INR 2.10 (A) 12/21/2018       Procedures    Assessment/Plan   1.  DVT    2.  Anticoagulation with Coumadin target range 2.0-3.0 plan continue 5/2.5/2.5 mg cycle Coumadin regimen with INR 1 month's time    3.  Maxillary sinusitis plan amoxicillin 875 twice a day for 10 days' time follow-up or cough not well in 10 days    4.  Left otitis media plan amoxicillin as above    Much of this encounter note is an electronic transcription/translation of spoken language to printed text.  The electronic translation of spoken language may permit erroneous, or at times, nonsensical words or phrases to be inadvertently transcribed.  Although  I have reviewed the note for such errors, some may still exist. If there are questions or for further clarification, please contact me.

## 2019-02-18 ENCOUNTER — OFFICE VISIT (OUTPATIENT)
Dept: FAMILY MEDICINE CLINIC | Facility: CLINIC | Age: 74
End: 2019-02-18

## 2019-02-18 VITALS
OXYGEN SATURATION: 96 % | WEIGHT: 160.8 LBS | BODY MASS INDEX: 21.78 KG/M2 | HEIGHT: 72 IN | TEMPERATURE: 97.7 F | DIASTOLIC BLOOD PRESSURE: 86 MMHG | HEART RATE: 73 BPM | SYSTOLIC BLOOD PRESSURE: 154 MMHG

## 2019-02-18 DIAGNOSIS — Z00.00 MEDICARE ANNUAL WELLNESS VISIT, SUBSEQUENT: Primary | ICD-10-CM

## 2019-02-18 DIAGNOSIS — M25.562 ACUTE PAIN OF LEFT KNEE: ICD-10-CM

## 2019-02-18 DIAGNOSIS — Z79.01 LONG TERM CURRENT USE OF ANTICOAGULANTS WITH INR GOAL OF 2.0-3.0: ICD-10-CM

## 2019-02-18 DIAGNOSIS — I82.4Z9 DEEP VEIN THROMBOSIS (DVT) OF DISTAL VEIN OF LOWER EXTREMITY, UNSPECIFIED CHRONICITY, UNSPECIFIED LATERALITY (HCC): ICD-10-CM

## 2019-02-18 LAB — INR PPP: 1.9 (ref 0.9–1.1)

## 2019-02-18 PROCEDURE — G0439 PPPS, SUBSEQ VISIT: HCPCS | Performed by: INTERNAL MEDICINE

## 2019-02-18 PROCEDURE — 36416 COLLJ CAPILLARY BLOOD SPEC: CPT | Performed by: INTERNAL MEDICINE

## 2019-02-18 PROCEDURE — 96160 PT-FOCUSED HLTH RISK ASSMT: CPT | Performed by: INTERNAL MEDICINE

## 2019-02-18 PROCEDURE — 73560 X-RAY EXAM OF KNEE 1 OR 2: CPT | Performed by: INTERNAL MEDICINE

## 2019-02-18 PROCEDURE — 85610 PROTHROMBIN TIME: CPT | Performed by: INTERNAL MEDICINE

## 2019-02-18 PROCEDURE — 99214 OFFICE O/P EST MOD 30 MIN: CPT | Performed by: INTERNAL MEDICINE

## 2019-02-18 RX ORDER — HYDROCODONE BITARTRATE AND ACETAMINOPHEN 5; 325 MG/1; MG/1
1 TABLET ORAL EVERY 6 HOURS PRN
Qty: 12 TABLET | Refills: 0 | Status: SHIPPED | OUTPATIENT
Start: 2019-02-18 | End: 2019-03-22

## 2019-02-18 RX ORDER — METOPROLOL TARTRATE 50 MG/1
50 TABLET, FILM COATED ORAL 2 TIMES DAILY
Qty: 180 TABLET | Refills: 2 | Status: SHIPPED | OUTPATIENT
Start: 2019-02-18 | End: 2020-06-29 | Stop reason: DRUGHIGH

## 2019-02-18 NOTE — PATIENT INSTRUCTIONS
Medicare Wellness  Personal Prevention Plan of Service     Date of Office Visit:  2019  Encounter Provider:  Jacinto Black MD  Place of Service:  [facility]  Patient Name: Narciso Romano  :  1945    As part of the Medicare Wellness portion of your visit today, we are providing you with this personalized preventive plan of services (PPPS). This plan is based upon recommendations of the [place] Preventive Services Task Force (USPSTF) and the Advisory Committee on Immunization Practices (ACIP).    This lists the preventive care services that should be considered, and provides dates of when you are due. Items listed as completed are up-to-date and do not require any further intervention.    Health Maintenance   Topic Date Due   • TDAP/TD VACCINES (1 - Tdap) 1965   • ZOSTER VACCINE (1 of 2) 1996   • HEPATITIS C SCREENING  2016   • MEDICARE ANNUAL WELLNESS  2018   • COLONOSCOPY  2020   • LIPID PANEL  2020   • INFLUENZA VACCINE  Completed   • PNEUMOCOCCAL VACCINES (65+ LOW/MEDIUM RISK)  Completed   • AAA SCREEN (ONE-TIME)  Completed       No orders of the defined types were placed in this encounter.      No Follow-up on file.

## 2019-02-18 NOTE — PROGRESS NOTES
QUICK REFERENCE INFORMATION:  The ABCs of the Annual Wellness Visit    Subsequent Medicare Wellness Visit    HEALTH RISK ASSESSMENT    1945    Recent Hospitalizations:  No hospitalization(s) within the last year..        Current Medical Providers:  Patient Care Team:  Jacinto Black Jr., MD as PCP - General  Jacinto Black Jr., MD as PCP - Family Medicine        Smoking Status:  Social History     Tobacco Use   Smoking Status Former Smoker   • Packs/day: 1.00   • Years: 30.00   • Pack years: 30.00   Smokeless Tobacco Never Used       Alcohol Consumption:  Social History     Substance and Sexual Activity   Alcohol Use No       Depression Screen:   PHQ-2/PHQ-9 Depression Screening 2/18/2019   Little interest or pleasure in doing things 0   Feeling down, depressed, or hopeless 0   Trouble falling or staying asleep, or sleeping too much 0   Feeling tired or having little energy 1   Poor appetite or overeating 0   Feeling bad about yourself - or that you are a failure or have let yourself or your family down 0   Trouble concentrating on things, such as reading the newspaper or watching television 0   Moving or speaking so slowly that other people could have noticed. Or the opposite - being so fidgety or restless that you have been moving around a lot more than usual 0   Thoughts that you would be better off dead, or of hurting yourself in some way 0   Total Score 1   If you checked off any problems, how difficult have these problems made it for you to do your work, take care of things at home, or get along with other people? Somewhat difficult       Health Habits and Functional and Cognitive Screening:  Functional & Cognitive Status 2/18/2019   Do you have difficulty preparing food and eating? No   Do you have difficulty bathing yourself, getting dressed or grooming yourself? No   Do you have difficulty using the toilet? No   Do you have difficulty moving around from place to place? No   Do you have trouble  with steps or getting out of a bed or a chair? No   In the past year have you fallen or experienced a near fall? Yes   Current Diet Well Balanced Diet   Dental Exam Up to date   Eye Exam Up to date   Exercise (times per week) 7 times per week   Current Exercise Activities Include Walking   Do you need help using the phone?  No   Are you deaf or do you have serious difficulty hearing?  No   Do you need help with transportation? No   Do you need help shopping? No   Do you need help preparing meals?  No   Do you need help with housework?  No   Do you need help with laundry? No   Do you need help taking your medications? No   Do you need help managing money? No   Do you ever drive or ride in a car without wearing a seat belt? No   Have you felt unusual stress, anger or loneliness in the last month? No   Who do you live with? Spouse   If you need help, do you have trouble finding someone available to you? No   Have you been bothered in the last four weeks by sexual problems? No   Do you have difficulty concentrating, remembering or making decisions? No           Does the patient have evidence of cognitive impairment? No    Aspirin use counseling: Taking ASA appropriately as indicated      Recent Lab Results:  CMP:  Lab Results   Component Value Date    BUN 28 (H) 01/08/2019    CREATININE 2.16 (H) 01/08/2019    EGFRIFAFRI 37 (L) 01/08/2019    BCR 13.0 01/08/2019     01/08/2019    K 4.8 01/08/2019    CO2 26.2 01/08/2019    CALCIUM 11.4 (H) 01/08/2019    ALBUMIN 4.40 01/08/2019    BILITOT 0.4 01/08/2019    ALKPHOS 48 01/08/2019    AST 30 01/08/2019    ALT 16 01/08/2019     Lipid Panel:  Lab Results   Component Value Date    CHOL 302 (H) 01/08/2019    TRIG 166 (H) 01/08/2019    HDL 88 (H) 01/08/2019    VLDL 33.2 01/08/2019    LDLHDL 2.05 01/08/2019     HbA1c:       Visual Acuity:  No exam data present    Age-appropriate Screening Schedule:  Refer to the list below for future screening recommendations based on patient's  age, sex and/or medical conditions. Orders for these recommended tests are listed in the plan section. The patient has been provided with a written plan.    Health Maintenance   Topic Date Due   • TDAP/TD VACCINES (1 - Tdap) 11/22/1965   • ZOSTER VACCINE (1 of 2) 11/22/1996   • COLONOSCOPY  01/01/2020   • LIPID PANEL  01/08/2020   • INFLUENZA VACCINE  Completed   • PNEUMOCOCCAL VACCINES (65+ LOW/MEDIUM RISK)  Completed        Subjective   History of Present Illness    Narciso Romano is a 73 y.o. male who presents for an Subsequent Wellness Visit.    The following portions of the patient's history were reviewed and updated as appropriate: allergies, current medications, past family history, past medical history, past social history, past surgical history and problem list.    Outpatient Medications Prior to Visit   Medication Sig Dispense Refill   • acyclovir (ZOVIRAX) 800 MG tablet TAKE 5 TABLETS BY MOUTH EVERY DAY 35 tablet 2   • albuterol (PROVENTIL HFA;VENTOLIN HFA) 108 (90 BASE) MCG/ACT inhaler Inhale 2 puffs every 4 (four) hours as needed for wheezing.     • aspirin 81 MG EC tablet Take 81 mg by mouth daily.     • atorvastatin (LIPITOR) 20 MG tablet Take 1 tablet by mouth Daily. For cholesterol 30 tablet 1   • azelastine (ASTELIN) 0.1 % nasal spray 2 sprays into the nostril(s) as directed by provider 2 (Two) Times a Day. Use in each nostril as directed 1 each 12   • finasteride (PROSCAR) 5 MG tablet Take 5 mg by mouth daily.     • Fluticasone Furoate-Vilanterol (BREO ELLIPTA) 100-25 MCG/INH aerosol powder  Inhale 1 puff Daily. 1 each 0   • hydrALAZINE (APRESOLINE) 100 MG tablet TAKE 1 TABLET 3 TIMES DAILY 270 tablet 0   • isosorbide dinitrate (ISORDIL) 20 MG tablet TAKE 1 TABLET THREE TIMES A DAY (Patient taking differently: TAKE 1 TABLET TWO TIMES A DAY) 90 tablet 2   • levothyroxine (SYNTHROID) 100 MCG tablet Take 1 tablet by mouth Daily. 30 tablet 1   • metoprolol tartrate (LOPRESSOR) 50 MG tablet Take 50  mg by mouth 2 (Two) Times a Day.  2   • montelukast (SINGULAIR) 10 MG tablet Take 1 tablet by mouth Every Night. 30 tablet 5   • NIFEdipine CC (ADALAT CC) 90 MG 24 hr tablet Take 1 tablet by mouth Daily. 90 tablet 3   • nitroglycerin (NITROSTAT) 0.4 MG SL tablet PLACE ONE TABLET UNDER THE TONGUE EVERY 5 MINUTES AS NEEDED FOR CHEST PAIN . NO MORE THAN 3 IN 15MIN 25 tablet 0   • pantoprazole (PROTONIX) 40 MG EC tablet Take 40 mg by mouth daily.     • sildenafil (REVATIO) 20 MG tablet Take 1 tablet by mouth Daily. 10 tablet 3   • tamsulosin (FLOMAX) 0.4 MG capsule 24 hr capsule Take 1 capsule by mouth Every Night.     • warfarin (COUMADIN) 5 MG tablet Take 2 tablets by mouth Daily. (Patient taking differently: Take 5 mg by mouth Daily. HOLDING) 180 tablet 3   • warfarin (COUMADIN) 5 MG tablet TAKE 2 TABLETS EVERY DAY 60 tablet 4   • fluconazole (DIFLUCAN) 200 MG tablet Take 200 mg by mouth Every 7 (Seven) Days.  1   • benzonatate (TESSALON PERLES) 100 MG capsule 1-2 tabs q8h prn  #30 30 capsule 1   • clindamycin (CLEOCIN) 300 MG capsule Take 1 capsule by mouth 3 (Three) Times a Day. x10d 30 capsule 0   • predniSONE (DELTASONE) 10 MG tablet Take 1 tablet by mouth Daily. 4 tabs x 2d, 3 tabs x2d,2 tabs x 2d, 1 tab x 2d 20 tablet 0     No facility-administered medications prior to visit.        Patient Active Problem List   Diagnosis   • DVT (deep venous thrombosis) (CMS/HCC)   • Long term current use of anticoagulants with INR goal of 2.0-3.0   • Essential hypertension   • Postoperative hypothyroidism   • BPH (benign prostatic hyperplasia)   • Hyperlipidemia   • GERD (gastroesophageal reflux disease)   • COPD (chronic obstructive pulmonary disease) (CMS/HCC)   • Renal disease   • Myocardial infarction (CMS/HCC)       Advance Care Planning:  has an advance directive - a copy has been provided and is in file    Identification of Risk Factors:  Risk factors include: increased fall risk.    Review of Systems    Compared to  one year ago, the patient feels his physical health is the same.  Compared to one year ago, the patient feels his mental health is the same.    Objective     Physical Exam    There were no vitals filed for this visit.    Patient's There is no height or weight on file to calculate BMI. BMI is within normal parameters. No follow-up required..      Assessment/Plan   Patient Self-Management and Personalized Health Advice  The patient has been provided with information about: fall prevention and preventive services including:   · Fall Risk assessment done.    Visit Diagnoses:  No diagnosis found.    No orders of the defined types were placed in this encounter.      Outpatient Encounter Medications as of 2/18/2019   Medication Sig Dispense Refill   • acyclovir (ZOVIRAX) 800 MG tablet TAKE 5 TABLETS BY MOUTH EVERY DAY 35 tablet 2   • albuterol (PROVENTIL HFA;VENTOLIN HFA) 108 (90 BASE) MCG/ACT inhaler Inhale 2 puffs every 4 (four) hours as needed for wheezing.     • aspirin 81 MG EC tablet Take 81 mg by mouth daily.     • atorvastatin (LIPITOR) 20 MG tablet Take 1 tablet by mouth Daily. For cholesterol 30 tablet 1   • azelastine (ASTELIN) 0.1 % nasal spray 2 sprays into the nostril(s) as directed by provider 2 (Two) Times a Day. Use in each nostril as directed 1 each 12   • finasteride (PROSCAR) 5 MG tablet Take 5 mg by mouth daily.     • Fluticasone Furoate-Vilanterol (BREO ELLIPTA) 100-25 MCG/INH aerosol powder  Inhale 1 puff Daily. 1 each 0   • hydrALAZINE (APRESOLINE) 100 MG tablet TAKE 1 TABLET 3 TIMES DAILY 270 tablet 0   • isosorbide dinitrate (ISORDIL) 20 MG tablet TAKE 1 TABLET THREE TIMES A DAY (Patient taking differently: TAKE 1 TABLET TWO TIMES A DAY) 90 tablet 2   • levothyroxine (SYNTHROID) 100 MCG tablet Take 1 tablet by mouth Daily. 30 tablet 1   • metoprolol tartrate (LOPRESSOR) 50 MG tablet Take 50 mg by mouth 2 (Two) Times a Day.  2   • montelukast (SINGULAIR) 10 MG tablet Take 1 tablet by mouth Every  Night. 30 tablet 5   • NIFEdipine CC (ADALAT CC) 90 MG 24 hr tablet Take 1 tablet by mouth Daily. 90 tablet 3   • nitroglycerin (NITROSTAT) 0.4 MG SL tablet PLACE ONE TABLET UNDER THE TONGUE EVERY 5 MINUTES AS NEEDED FOR CHEST PAIN . NO MORE THAN 3 IN 15MIN 25 tablet 0   • pantoprazole (PROTONIX) 40 MG EC tablet Take 40 mg by mouth daily.     • sildenafil (REVATIO) 20 MG tablet Take 1 tablet by mouth Daily. 10 tablet 3   • tamsulosin (FLOMAX) 0.4 MG capsule 24 hr capsule Take 1 capsule by mouth Every Night.     • warfarin (COUMADIN) 5 MG tablet Take 2 tablets by mouth Daily. (Patient taking differently: Take 5 mg by mouth Daily. HOLDING) 180 tablet 3   • warfarin (COUMADIN) 5 MG tablet TAKE 2 TABLETS EVERY DAY 60 tablet 4   • [DISCONTINUED] fluconazole (DIFLUCAN) 200 MG tablet Take 200 mg by mouth Every 7 (Seven) Days.  1   • [DISCONTINUED] benzonatate (TESSALON PERLES) 100 MG capsule 1-2 tabs q8h prn  #30 30 capsule 1   • [DISCONTINUED] clindamycin (CLEOCIN) 300 MG capsule Take 1 capsule by mouth 3 (Three) Times a Day. x10d 30 capsule 0   • [DISCONTINUED] predniSONE (DELTASONE) 10 MG tablet Take 1 tablet by mouth Daily. 4 tabs x 2d, 3 tabs x2d,2 tabs x 2d, 1 tab x 2d 20 tablet 0     No facility-administered encounter medications on file as of 2/18/2019.        Reviewed use of high risk medication in the elderly: yes  Reviewed for potential of harmful drug interactions in the elderly: yes    Follow Up:  No Follow-up on file.     An After Visit Summary and PPPS with all of these plans were given to the patient.

## 2019-03-08 ENCOUNTER — OFFICE VISIT (OUTPATIENT)
Dept: FAMILY MEDICINE CLINIC | Facility: CLINIC | Age: 74
End: 2019-03-08

## 2019-03-08 VITALS
BODY MASS INDEX: 21.59 KG/M2 | TEMPERATURE: 97.6 F | DIASTOLIC BLOOD PRESSURE: 94 MMHG | SYSTOLIC BLOOD PRESSURE: 142 MMHG | HEIGHT: 72 IN | WEIGHT: 159.4 LBS

## 2019-03-08 DIAGNOSIS — R20.2 PARESTHESIAS: ICD-10-CM

## 2019-03-08 DIAGNOSIS — I10 HYPERTENSION, ESSENTIAL: ICD-10-CM

## 2019-03-08 DIAGNOSIS — Z79.01 LONG TERM CURRENT USE OF ANTICOAGULANTS WITH INR GOAL OF 2.0-3.0: Primary | ICD-10-CM

## 2019-03-08 DIAGNOSIS — I82.4Z9 DEEP VEIN THROMBOSIS (DVT) OF DISTAL VEIN OF LOWER EXTREMITY, UNSPECIFIED CHRONICITY, UNSPECIFIED LATERALITY (HCC): ICD-10-CM

## 2019-03-08 LAB — INR PPP: 1.7 (ref 0.9–1.1)

## 2019-03-08 PROCEDURE — 36416 COLLJ CAPILLARY BLOOD SPEC: CPT | Performed by: INTERNAL MEDICINE

## 2019-03-08 PROCEDURE — 85610 PROTHROMBIN TIME: CPT | Performed by: INTERNAL MEDICINE

## 2019-03-08 PROCEDURE — 99213 OFFICE O/P EST LOW 20 MIN: CPT | Performed by: INTERNAL MEDICINE

## 2019-03-08 NOTE — PROGRESS NOTES
Subjective   Narciso Romano is a 73 y.o. male.   Patient history of DVT on Coumadin for same.  Takes 2.5/2 .5/5 cycle  History of Present Illness DVT on Coumadin blood pressure slightly elevated today we will monitor this as well and recheck it on return patient about medications also no dietary indiscretions.  C patient's current Coumadin dose is 2.5/2 .5/5  .  New dose will be 2.5/5/2.5/5 patient also complained about tingling in the tips of his fingers no weakness with this does not preclude the right hand it seems to go up to the forearm when he extends his right arm pick something up.  Plan on tingling tips of fingers both hands.  With the right hand occasional extension to the proximal forearm with extending arm to  something.  No clumsiness no weakness noted.  Review of Systems   All other systems reviewed and are negative.      Objective   Vitals:    03/08/19 0748   BP: 142/94   Temp: 97.6 °F (36.4 °C)   Weight: 72.3 kg (159 lb 6.4 oz)     Physical Exam   Constitutional: He appears well-developed and well-nourished.   HENT:   Head: Normocephalic and atraumatic.   Eyes: Conjunctivae are normal. Pupils are equal, round, and reactive to light.   Cardiovascular: Normal rate, regular rhythm and normal heart sounds.   Pulmonary/Chest: Effort normal and breath sounds normal.   Musculoskeletal:   Left and right radial and ulnar pulse are 2+ normal  bilaterally.   Nursing note and vitals reviewed.      Lab Results   Component Value Date    INR 1.70 (A) 03/08/2019    INR 1.90 (A) 02/18/2019    INR 2.10 (A) 01/21/2019       Procedures    Assessment/Plan     1.  DVT  2.  Anticoagulation with Coumadin target range 2.5-3.0  Change from 2.5/2.5/5  To 2.5/5 alternating with INR 1 week   B6 50 mg    3.  Paresthesias of hands try B6 50 g daily is not resolved problem refer to hand surgery    4.  Hypertension slightly elevated will recheck on return.    Much of this encounter note is an electronic  transcription/translation of spoken language to printed text.  The electronic translation of spoken language may permit erroneous, or at times, nonsensical words or phrases to be inadvertently transcribed.  Although I have reviewed the note for such errors, some may still exist. If there are questions or for further clarification, please contact me.

## 2019-03-15 ENCOUNTER — OFFICE VISIT (OUTPATIENT)
Dept: FAMILY MEDICINE CLINIC | Facility: CLINIC | Age: 74
End: 2019-03-15

## 2019-03-15 VITALS
RESPIRATION RATE: 18 BRPM | BODY MASS INDEX: 21.81 KG/M2 | OXYGEN SATURATION: 96 % | TEMPERATURE: 98 F | SYSTOLIC BLOOD PRESSURE: 140 MMHG | HEART RATE: 80 BPM | WEIGHT: 161 LBS | HEIGHT: 72 IN | DIASTOLIC BLOOD PRESSURE: 70 MMHG

## 2019-03-15 DIAGNOSIS — J40 BRONCHITIS: Primary | ICD-10-CM

## 2019-03-15 DIAGNOSIS — J01.00 ACUTE MAXILLARY SINUSITIS, RECURRENCE NOT SPECIFIED: ICD-10-CM

## 2019-03-15 DIAGNOSIS — I82.4Z9 DEEP VEIN THROMBOSIS (DVT) OF DISTAL VEIN OF LOWER EXTREMITY, UNSPECIFIED CHRONICITY, UNSPECIFIED LATERALITY (HCC): ICD-10-CM

## 2019-03-15 DIAGNOSIS — Z79.01 LONG TERM CURRENT USE OF ANTICOAGULANTS WITH INR GOAL OF 2.0-3.0: ICD-10-CM

## 2019-03-15 DIAGNOSIS — H61.21 IMPACTED CERUMEN OF RIGHT EAR: ICD-10-CM

## 2019-03-15 LAB — INR PPP: 1.6 (ref 0.9–1.1)

## 2019-03-15 PROCEDURE — 85610 PROTHROMBIN TIME: CPT | Performed by: INTERNAL MEDICINE

## 2019-03-15 PROCEDURE — 69209 REMOVE IMPACTED EAR WAX UNI: CPT | Performed by: INTERNAL MEDICINE

## 2019-03-15 PROCEDURE — 36416 COLLJ CAPILLARY BLOOD SPEC: CPT | Performed by: INTERNAL MEDICINE

## 2019-03-15 PROCEDURE — 71046 X-RAY EXAM CHEST 2 VIEWS: CPT | Performed by: INTERNAL MEDICINE

## 2019-03-15 PROCEDURE — 99214 OFFICE O/P EST MOD 30 MIN: CPT | Performed by: INTERNAL MEDICINE

## 2019-03-15 RX ORDER — ACYCLOVIR 800 MG/1
800 TABLET ORAL
Qty: 35 TABLET | Refills: 2 | Status: SHIPPED | OUTPATIENT
Start: 2019-03-15 | End: 2021-01-01 | Stop reason: SDUPTHER

## 2019-03-15 RX ORDER — NITROGLYCERIN 0.4 MG/1
0.4 TABLET SUBLINGUAL
Qty: 25 TABLET | Refills: 3 | Status: SHIPPED | OUTPATIENT
Start: 2019-03-15 | End: 2021-01-01 | Stop reason: SDUPTHER

## 2019-03-15 RX ORDER — CLINDAMYCIN HYDROCHLORIDE 300 MG/1
300 CAPSULE ORAL 3 TIMES DAILY
Qty: 30 CAPSULE | Refills: 0 | Status: SHIPPED | OUTPATIENT
Start: 2019-03-15 | End: 2019-04-19

## 2019-03-15 NOTE — PROGRESS NOTES
Subjective   Narciso Romano is a 73 y.o. male.   Patient on Coumadin for DVT target range 2.0-3.0 here for follow-up on that.  Also is having increased cough sinus trouble.  History of Present Illness   Patient here on Coumadin for DVT try to get an target range for patient as he is low today or again increasing to a 2.5/5 as well follow-up in 1 week's time.  Also some increasing cough congestion sinus pressure not responding to amoxicillin will switch to clindamycin due to persistence of cough for the chest x-ray as well today.  No reported increased temperature patient complains of above pressure in the right ear some diminished hearing as well.  Physical exam does show a serum impaction on the right but not on left  Family history positive for heart disease hypertension thyroid disease kidney disease and lung disease patient is a former smoker.  Drug allergies include Keflex causing hives Cipro causing hives Levoxyl lisinopril Norvasc and Tiazac  Review of Systems   All other systems reviewed and are negative.      Objective   Vitals:    03/15/19 0759   Resp: 18   Weight: 73 kg (161 lb)     Physical Exam   Constitutional: He appears well-developed and well-nourished.   HENT:   Head: Normocephalic and atraumatic.   Left Ear: External ear normal.   Right ear canal blocked by wax   Eyes: Conjunctivae are normal. Pupils are equal, round, and reactive to light.   Cardiovascular: Normal rate, regular rhythm and normal heart sounds.   Pulmonary/Chest: Effort normal and breath sounds normal.   No wheezes heard today   Neurological: He is alert.   Unremarkable gait and station   Skin: Skin is warm and dry.   Nursing note and vitals reviewed.      Lab Results   Component Value Date    INR 1.70 (A) 03/08/2019    INR 1.90 (A) 02/18/2019    INR 2.10 (A) 01/21/2019       Ear Cerumen Removal Instrumentation  Date/Time: 3/15/2019 9:48 AM  Performed by: Jacinto Black Jr., MD  Authorized by: Jacinto Black Jr., MD    Consent: Verbal consent obtained.  Consent given by: patient  Patient identity confirmed: verbally with patient    Anesthesia:  Local Anesthetic: none  Location details: right ear  Patient tolerance: Patient tolerated the procedure well with no immediate complications  Comments: Right ear irrigated with H2O total resolution of cerumen.  Post procedure inspection of ear shows no obvious injury to either TM or irritation of the ear canal.  With resolution of cerumen impaction patient does not does note increased ability to hear.  Procedure tolerated well  Procedure type: irrigation   Sedation:  Patient sedated: no          Right ear irrigation seizure tolerated well chest x-ray AP and lateral obtained.  No active peripheral infiltrate seen.  Bony or soft tissue normalities noted.  Do have a comparison chest x-ray without significant changes noted comparison chest x-ray from 12/21/2018 obtain pacemaker present then and now unchanged.  Assessment/Plan     1.  DVT    2.  And a question with Coumadin plan change Coumadin dose to 2.5/5 alternating with INR 1 week's time    3.  Bronchitis    4.  Maxillary   Sinusitis plan clindamycin 300 3 times daily for 10 days time for both these patient discussed how well he is doing on follow-up in 1 week's time    5.  Right cerumen impaction irrigated with H2O with resolution of the ear follow-up as needed    Much of this encounter note is an electronic transcription/translation of spoken language to printed text.  The electronic translation of spoken language may permit erroneous, or at times, nonsensical words or phrases to be inadvertently transcribed.  Although I have reviewed the note for such errors, some may still exist. If there are questions or for further clarification, please contact me.

## 2019-03-22 ENCOUNTER — OFFICE VISIT (OUTPATIENT)
Dept: FAMILY MEDICINE CLINIC | Facility: CLINIC | Age: 74
End: 2019-03-22

## 2019-03-22 VITALS
HEART RATE: 60 BPM | RESPIRATION RATE: 18 BRPM | WEIGHT: 164 LBS | TEMPERATURE: 98.2 F | OXYGEN SATURATION: 95 % | SYSTOLIC BLOOD PRESSURE: 130 MMHG | BODY MASS INDEX: 22.21 KG/M2 | DIASTOLIC BLOOD PRESSURE: 70 MMHG | HEIGHT: 72 IN

## 2019-03-22 DIAGNOSIS — I10 HYPERTENSION, ESSENTIAL: ICD-10-CM

## 2019-03-22 DIAGNOSIS — I82.4Z9 DEEP VEIN THROMBOSIS (DVT) OF DISTAL VEIN OF LOWER EXTREMITY, UNSPECIFIED CHRONICITY, UNSPECIFIED LATERALITY (HCC): ICD-10-CM

## 2019-03-22 DIAGNOSIS — Z79.01 LONG TERM CURRENT USE OF ANTICOAGULANTS WITH INR GOAL OF 2.0-3.0: Primary | ICD-10-CM

## 2019-03-22 LAB — INR PPP: 1.5 (ref 0.9–1.1)

## 2019-03-22 PROCEDURE — 36416 COLLJ CAPILLARY BLOOD SPEC: CPT | Performed by: INTERNAL MEDICINE

## 2019-03-22 PROCEDURE — 85610 PROTHROMBIN TIME: CPT | Performed by: INTERNAL MEDICINE

## 2019-03-22 PROCEDURE — 99213 OFFICE O/P EST LOW 20 MIN: CPT | Performed by: INTERNAL MEDICINE

## 2019-03-22 NOTE — PROGRESS NOTES
Subjective   Narciso Romano is a 73 y.o. male.   Patient on Coumadin long-term for DVT doing fairly well blood pressure was quite satisfactory day  History of Present Illness   On Coumadin for DVT coronary disease without complaints hypertension doing well today patient is not sure why his Coumadin dose is low no dietary indiscretions he is aware of the last couple times we will going to increase his Coumadin dose to 5/5/2 0.5 cycle with follow-up in 1 week's time no complaints.    Review of Systems   All other systems reviewed and are negative.      Objective   Vitals:    03/22/19 0755   BP: 130/70   Pulse: 60   Resp: 18   Temp: 98.2 °F (36.8 °C)   SpO2: 95%   Weight: 74.4 kg (164 lb)     Physical Exam   Constitutional: He appears well-developed and well-nourished.   HENT:   Head: Normocephalic and atraumatic.   Eyes: Conjunctivae are normal. Pupils are equal, round, and reactive to light.   Cardiovascular: Normal rate, regular rhythm and normal heart sounds.   Pulmonary/Chest: Effort normal and breath sounds normal.   Nursing note and vitals reviewed.      Lab Results   Component Value Date    INR 1.60 (A) 03/15/2019    INR 1.70 (A) 03/08/2019    INR 1.90 (A) 02/18/2019       Procedures    Assessment/Plan  1.  DVT    2.  Anticoagulation with Coumadin plan change to new dose    5/5/2.5 milligrams as a 3-day cycle with follow-up in 1 week's time    3.  Hypertension controlled continue present medicine    Much of this encounter note is an electronic transcription/translation of spoken language to printed text.  The electronic translation of spoken language may permit erroneous, or at times, nonsensical words or phrases to be inadvertently transcribed.  Although I have reviewed the note for such errors, some may still exist. If there are questions or for further clarification, please contact me.

## 2019-03-29 ENCOUNTER — OFFICE VISIT (OUTPATIENT)
Dept: FAMILY MEDICINE CLINIC | Facility: CLINIC | Age: 74
End: 2019-03-29

## 2019-03-29 VITALS
DIASTOLIC BLOOD PRESSURE: 82 MMHG | OXYGEN SATURATION: 93 % | HEART RATE: 68 BPM | HEIGHT: 72 IN | WEIGHT: 161.8 LBS | SYSTOLIC BLOOD PRESSURE: 144 MMHG | BODY MASS INDEX: 21.91 KG/M2 | TEMPERATURE: 98 F

## 2019-03-29 DIAGNOSIS — Z79.01 LONG TERM CURRENT USE OF ANTICOAGULANTS WITH INR GOAL OF 2.0-3.0: Primary | ICD-10-CM

## 2019-03-29 DIAGNOSIS — I82.4Z9 DEEP VEIN THROMBOSIS (DVT) OF DISTAL VEIN OF LOWER EXTREMITY, UNSPECIFIED CHRONICITY, UNSPECIFIED LATERALITY (HCC): ICD-10-CM

## 2019-03-29 DIAGNOSIS — J30.89 NON-SEASONAL ALLERGIC RHINITIS, UNSPECIFIED TRIGGER: ICD-10-CM

## 2019-03-29 LAB — INR PPP: 1.5 (ref 0.9–1.1)

## 2019-03-29 PROCEDURE — 99213 OFFICE O/P EST LOW 20 MIN: CPT | Performed by: INTERNAL MEDICINE

## 2019-03-29 PROCEDURE — 36416 COLLJ CAPILLARY BLOOD SPEC: CPT | Performed by: INTERNAL MEDICINE

## 2019-03-29 PROCEDURE — 85610 PROTHROMBIN TIME: CPT | Performed by: INTERNAL MEDICINE

## 2019-03-29 RX ORDER — MONTELUKAST SODIUM 10 MG/1
10 TABLET ORAL NIGHTLY
Qty: 30 TABLET | Refills: 0 | Status: SHIPPED | OUTPATIENT
Start: 2019-03-29 | End: 2019-04-19 | Stop reason: SDUPTHER

## 2019-03-29 NOTE — PROGRESS NOTES
Subjective   Narciso Romano is a 73 y.o. male.  Follow-up on Coumadin for DVT  History of Present Illness   On Coumadin for DVT target range 2.0-3.0 currently is on a two-point 5/5/5 regimen INR today is still lacking he is avoiding vitamin K rich foods etiology of low INR not obvious we will increase his Coumadin dose to 5/5/5/2 0.5 today continues to have allergic rhinitis we will have patient do Singulair 10 mg daily he is already doing Astelin nasal spray needs to add Flonase nasal spray if he is not doing that tissue will have patient take an Singulair 10 mg daily if this does not resolve problem he is to see ENT.  There is right knee problems encouraged him to see orthopedic surgeon with probable steroid injection for ongoing knee issues.    Review of Systems   All other systems reviewed and are negative.      Objective   Vitals:    03/29/19 0747   BP: 144/82   Pulse: 68   Temp: 98 °F (36.7 °C)   SpO2: 93%   Weight: 73.4 kg (161 lb 12.8 oz)     Physical Exam   Constitutional: He appears well-developed and well-nourished.   HENT:   Head: Normocephalic and atraumatic.   Eyes: Conjunctivae are normal. Pupils are equal, round, and reactive to light.   Cardiovascular: Normal rate, regular rhythm and normal heart sounds.   Pulmonary/Chest: Effort normal and breath sounds normal.   Nursing note and vitals reviewed.      Lab Results   Component Value Date    INR 1.50 (A) 03/29/2019    INR 1.50 (A) 03/22/2019    INR 1.60 (A) 03/15/2019       Procedures    Assessment/Plan .  1.  DVT    2.  Anticoagulation with Coumadin target range 2.5-3.0 plan  Change to5/ 5/5/2 0.5 Coumadin dose INR 1 week's time Singulair 10     3.  Nasal drainage plan Singulair 10 mg daily Flonase nasal spray continue last nasal spray if addition of Singulair does not resolve problems patient is to see ENT    Much of this encounter note is an electronic transcription/translation of spoken language to printed text.  The electronic translation of  spoken language may permit erroneous, or at times, nonsensical words or phrases to be inadvertently transcribed.  Although I have reviewed the note for such errors, some may still exist. If there are questions or for further clarification, please contact me.

## 2019-04-05 ENCOUNTER — OFFICE VISIT (OUTPATIENT)
Dept: FAMILY MEDICINE CLINIC | Facility: CLINIC | Age: 74
End: 2019-04-05

## 2019-04-05 VITALS
HEART RATE: 59 BPM | SYSTOLIC BLOOD PRESSURE: 130 MMHG | HEIGHT: 72 IN | OXYGEN SATURATION: 95 % | BODY MASS INDEX: 21.81 KG/M2 | DIASTOLIC BLOOD PRESSURE: 70 MMHG | TEMPERATURE: 98.2 F | WEIGHT: 161 LBS

## 2019-04-05 DIAGNOSIS — J01.00 ACUTE MAXILLARY SINUSITIS, RECURRENCE NOT SPECIFIED: ICD-10-CM

## 2019-04-05 DIAGNOSIS — Z79.01 LONG TERM CURRENT USE OF ANTICOAGULANTS WITH INR GOAL OF 2.0-3.0: ICD-10-CM

## 2019-04-05 DIAGNOSIS — I82.4Z9 DEEP VEIN THROMBOSIS (DVT) OF DISTAL VEIN OF LOWER EXTREMITY, UNSPECIFIED CHRONICITY, UNSPECIFIED LATERALITY (HCC): Primary | ICD-10-CM

## 2019-04-05 LAB — INR PPP: 1.5 (ref 0.9–1.1)

## 2019-04-05 PROCEDURE — 85610 PROTHROMBIN TIME: CPT | Performed by: INTERNAL MEDICINE

## 2019-04-05 PROCEDURE — 36416 COLLJ CAPILLARY BLOOD SPEC: CPT | Performed by: INTERNAL MEDICINE

## 2019-04-05 PROCEDURE — 99213 OFFICE O/P EST LOW 20 MIN: CPT | Performed by: INTERNAL MEDICINE

## 2019-04-05 RX ORDER — AMOXICILLIN 875 MG/1
875 TABLET, COATED ORAL 2 TIMES DAILY
Qty: 20 TABLET | Refills: 0 | Status: SHIPPED | OUTPATIENT
Start: 2019-04-05 | End: 2019-04-15

## 2019-04-05 NOTE — PROGRESS NOTES
Subjective   Narciso Romano is a 73 y.o. male.   On Coumadin for DVT long-term also is having some ongoing and increased sinus drainage now green to yellow.  History of Present Illness   Coumadin several dose changes she is now on 0552.5 regimen INR is still low we will increase Coumadin 5 mg every day with recheck in 2 weeks time regarding sinus drainage continues to drip Singulair was not effective we will give him amoxicillin for the now cloudy greenish color he will need to see ENT if he is agreeable to do so no other complaints at this point blood pressure satisfactory    Review of Systems   All other systems reviewed and are negative.      Objective   Vitals:    04/05/19 0750   BP: 130/70   Pulse: 59   Temp: 98.2 °F (36.8 °C)   SpO2: 95%   Weight: 73 kg (161 lb)     Physical Exam   Constitutional: He appears well-developed and well-nourished.   HENT:   Head: Normocephalic and atraumatic.   Complain of pressure over maxillary sinuses.   Eyes: Conjunctivae are normal. Pupils are equal, round, and reactive to light.   Cardiovascular: Normal rate, regular rhythm and normal heart sounds.   Pulmonary/Chest: Effort normal and breath sounds normal.   Vitals reviewed.      Lab Results   Component Value Date    INR 1.50 (A) 04/05/2019    INR 1.50 (A) 03/29/2019    INR 1.50 (A) 03/22/2019       Procedures    Assessment/Plan 1.  DVT    2.  Anticoagulation with Coumadin target range 2.0-3.0 plan Coumadin changed to 5 mg daily with follow-up in 2 weeks time    3.  Maxillary sinusitis plan amoxicillin 875 twice daily for 10 days time.resolve 10 days suggest ENT consult    Much of this encounter note is an electronic transcription/translation of spoken language to printed text.  The electronic translation of spoken language may permit erroneous, or at times, nonsensical words or phrases to be inadvertently transcribed.  Although I have reviewed the note for such errors, some may still exist. If there are questions or for  further clarification, please contact me.

## 2019-04-19 ENCOUNTER — OFFICE VISIT (OUTPATIENT)
Dept: FAMILY MEDICINE CLINIC | Facility: CLINIC | Age: 74
End: 2019-04-19

## 2019-04-19 VITALS
OXYGEN SATURATION: 95 % | BODY MASS INDEX: 21.81 KG/M2 | HEIGHT: 72 IN | HEART RATE: 58 BPM | RESPIRATION RATE: 18 BRPM | SYSTOLIC BLOOD PRESSURE: 122 MMHG | TEMPERATURE: 98 F | DIASTOLIC BLOOD PRESSURE: 70 MMHG | WEIGHT: 161 LBS

## 2019-04-19 DIAGNOSIS — Z79.01 LONG TERM CURRENT USE OF ANTICOAGULANTS WITH INR GOAL OF 2.0-3.0: ICD-10-CM

## 2019-04-19 DIAGNOSIS — I82.4Z9 DEEP VEIN THROMBOSIS (DVT) OF DISTAL VEIN OF LOWER EXTREMITY, UNSPECIFIED CHRONICITY, UNSPECIFIED LATERALITY (HCC): Primary | ICD-10-CM

## 2019-04-19 DIAGNOSIS — I10 HYPERTENSION, ESSENTIAL: ICD-10-CM

## 2019-04-19 LAB — INR PPP: 1.9 (ref 0.9–1.1)

## 2019-04-19 PROCEDURE — 99213 OFFICE O/P EST LOW 20 MIN: CPT | Performed by: INTERNAL MEDICINE

## 2019-04-19 PROCEDURE — 36416 COLLJ CAPILLARY BLOOD SPEC: CPT | Performed by: INTERNAL MEDICINE

## 2019-04-19 PROCEDURE — 85610 PROTHROMBIN TIME: CPT | Performed by: INTERNAL MEDICINE

## 2019-04-19 RX ORDER — HYDRALAZINE HYDROCHLORIDE 100 MG/1
100 TABLET, FILM COATED ORAL 3 TIMES DAILY
Qty: 90 TABLET | Refills: 10 | Status: SHIPPED | OUTPATIENT
Start: 2019-04-19 | End: 2020-06-09

## 2019-04-19 NOTE — PROGRESS NOTES
Subjective   Narciso Romano is a 73 y.o. male.   History of DVT long-term Coumadin for same recent dose adjustment.  History of Present Illness   DVT, on Coumadin for same reason dose adjustment to 5 mg every day INR today is improved to 1.9 from 1.5 so not quite it is 2.0-3.0 range target we will further adjust this blood pressures not be satisfactory today we will continue same dosage there    Review of Systems   All other systems reviewed and are negative.      Objective   Vitals:    04/19/19 0803   Resp: 18   Weight: 73 kg (161 lb)     Physical Exam   Constitutional: He appears well-developed and well-nourished.   HENT:   Head: Normocephalic and atraumatic.   Eyes: Conjunctivae are normal. Pupils are equal, round, and reactive to light.   Cardiovascular: Normal rate, regular rhythm and normal heart sounds.   Pulmonary/Chest: Effort normal and breath sounds normal.   Neurological: He is alert.   Unremarkable gait and station   Nursing note and vitals reviewed.      Lab Results   Component Value Date    INR 1.50 (A) 04/05/2019    INR 1.50 (A) 03/29/2019    INR 1.50 (A) 03/22/2019       Procedures    Assessment/Plan 1.  DVT    2.  Anticoagulation with Coumadin target range 2-3 plan new dose of s 7.5/ 5/5/5  Repeat INR in 2 weeks time    3.  Hypertension controlled continue same    Much of this encounter note is an electronic transcription/translation of spoken language to printed text.  The electronic translation of spoken language may permit erroneous, or at times, nonsensical words or phrases to be inadvertently transcribed.  Although I have reviewed the note for such errors, some may still exist. If there are questions or for further clarification, please contact me.

## 2019-05-06 ENCOUNTER — OFFICE VISIT (OUTPATIENT)
Dept: FAMILY MEDICINE CLINIC | Facility: CLINIC | Age: 74
End: 2019-05-06

## 2019-05-06 ENCOUNTER — HOSPITAL ENCOUNTER (OUTPATIENT)
Dept: CARDIOLOGY | Facility: HOSPITAL | Age: 74
Discharge: HOME | End: 2019-05-06
Admitting: INTERNAL MEDICINE

## 2019-05-06 VITALS
HEIGHT: 72 IN | TEMPERATURE: 97.6 F | OXYGEN SATURATION: 95 % | HEART RATE: 57 BPM | BODY MASS INDEX: 21.78 KG/M2 | SYSTOLIC BLOOD PRESSURE: 124 MMHG | WEIGHT: 160.8 LBS | DIASTOLIC BLOOD PRESSURE: 84 MMHG

## 2019-05-06 DIAGNOSIS — Z79.01 LONG TERM CURRENT USE OF ANTICOAGULANTS WITH INR GOAL OF 2.0-3.0: ICD-10-CM

## 2019-05-06 DIAGNOSIS — L60.8 TOENAIL DEFORMITY: ICD-10-CM

## 2019-05-06 DIAGNOSIS — Z86.718 HISTORY OF DVT OF LOWER EXTREMITY: Primary | ICD-10-CM

## 2019-05-06 DIAGNOSIS — R60.0 LEG EDEMA, LEFT: ICD-10-CM

## 2019-05-06 LAB
BH CV LOW VAS LEFT DISTAL FEMORAL SPONT: 1
BH CV LOW VAS LEFT POPLITEAL SPONT: 1
BH CV LOWER VASCULAR LEFT COMMON FEMORAL AUGMENT: NORMAL
BH CV LOWER VASCULAR LEFT COMMON FEMORAL COMPETENT: NORMAL
BH CV LOWER VASCULAR LEFT COMMON FEMORAL COMPRESS: NORMAL
BH CV LOWER VASCULAR LEFT COMMON FEMORAL PHASIC: NORMAL
BH CV LOWER VASCULAR LEFT COMMON FEMORAL SPONT: NORMAL
BH CV LOWER VASCULAR LEFT DISTAL FEMORAL AUGMENT: NORMAL
BH CV LOWER VASCULAR LEFT DISTAL FEMORAL COMPETENT: NORMAL
BH CV LOWER VASCULAR LEFT DISTAL FEMORAL COMPRESS: NORMAL
BH CV LOWER VASCULAR LEFT DISTAL FEMORAL PHASIC: NORMAL
BH CV LOWER VASCULAR LEFT DISTAL FEMORAL SPONT: NORMAL
BH CV LOWER VASCULAR LEFT DISTAL FEMORAL THROMBUS: NORMAL
BH CV LOWER VASCULAR LEFT GASTRONEMIUS COMPRESS: NORMAL
BH CV LOWER VASCULAR LEFT GREATER SAPH AK COMPRESS: NORMAL
BH CV LOWER VASCULAR LEFT GREATER SAPH BK COMPRESS: NORMAL
BH CV LOWER VASCULAR LEFT LESSER SAPH COMPRESS: NORMAL
BH CV LOWER VASCULAR LEFT MID FEMORAL AUGMENT: NORMAL
BH CV LOWER VASCULAR LEFT MID FEMORAL COMPETENT: NORMAL
BH CV LOWER VASCULAR LEFT MID FEMORAL COMPRESS: NORMAL
BH CV LOWER VASCULAR LEFT MID FEMORAL PHASIC: NORMAL
BH CV LOWER VASCULAR LEFT MID FEMORAL SPONT: NORMAL
BH CV LOWER VASCULAR LEFT PERONEAL COMPRESS: NORMAL
BH CV LOWER VASCULAR LEFT POPLITEAL AUGMENT: NORMAL
BH CV LOWER VASCULAR LEFT POPLITEAL COMPETENT: NORMAL
BH CV LOWER VASCULAR LEFT POPLITEAL COMPRESS: NORMAL
BH CV LOWER VASCULAR LEFT POPLITEAL PHASIC: NORMAL
BH CV LOWER VASCULAR LEFT POPLITEAL SPONT: NORMAL
BH CV LOWER VASCULAR LEFT POPLITEAL THROMBUS: NORMAL
BH CV LOWER VASCULAR LEFT POSTERIOR TIBIAL COMPRESS: NORMAL
BH CV LOWER VASCULAR LEFT PROXIMAL FEMORAL COMPRESS: NORMAL
BH CV LOWER VASCULAR LEFT SAPHENOFEMORAL JUNCTION AUGMENT: NORMAL
BH CV LOWER VASCULAR LEFT SAPHENOFEMORAL JUNCTION COMPETENT: NORMAL
BH CV LOWER VASCULAR LEFT SAPHENOFEMORAL JUNCTION COMPRESS: NORMAL
BH CV LOWER VASCULAR LEFT SAPHENOFEMORAL JUNCTION PHASIC: NORMAL
BH CV LOWER VASCULAR LEFT SAPHENOFEMORAL JUNCTION SPONT: NORMAL
BH CV LOWER VASCULAR RIGHT COMMON FEMORAL AUGMENT: NORMAL
BH CV LOWER VASCULAR RIGHT COMMON FEMORAL COMPETENT: NORMAL
BH CV LOWER VASCULAR RIGHT COMMON FEMORAL COMPRESS: NORMAL
BH CV LOWER VASCULAR RIGHT COMMON FEMORAL PHASIC: NORMAL
BH CV LOWER VASCULAR RIGHT COMMON FEMORAL SPONT: NORMAL
INR PPP: 3.3 (ref 0.9–1.1)

## 2019-05-06 PROCEDURE — 85610 PROTHROMBIN TIME: CPT | Performed by: INTERNAL MEDICINE

## 2019-05-06 PROCEDURE — 36416 COLLJ CAPILLARY BLOOD SPEC: CPT | Performed by: INTERNAL MEDICINE

## 2019-05-06 PROCEDURE — 93971 EXTREMITY STUDY: CPT

## 2019-05-06 PROCEDURE — 99214 OFFICE O/P EST MOD 30 MIN: CPT | Performed by: INTERNAL MEDICINE

## 2019-05-06 RX ORDER — MONTELUKAST SODIUM 10 MG/1
TABLET ORAL
Qty: 30 TABLET | Refills: 11 | Status: SHIPPED | OUTPATIENT
Start: 2019-05-06 | End: 2019-05-20 | Stop reason: SDUPTHER

## 2019-05-06 NOTE — PROGRESS NOTES
Subjective   Narciso Romano is a 73 y.o. male.   Patient follow-up with DVT on Coumadin taking 5 mg daily  History of Present Illness on Coumadin for DVT long-standing several dose changes recently.  Medication  5mg qd  5mg qd INR is slightly elevated today we will change him to 5 mg 3 days with 2.5 mg for today we will follow-up INR in 2 weeks time patient also notes leg edema on left leg recently also has a very hypertrophic probably fungal toenail left first is just cc podiatrist for that he seen Dr. Ramos before schedule that he is on Diflucan which is not been effective for him.  Regarding the leg swelling there is no injury no specific reason for this history of DVT we will worry about another DVT and get venous Doppler done today for left leg.  Rule out same  Family history positive for hypertension heart disease thyroid trouble lung cancer and kidney disease..  Patient is former smoker 1 pack/day for 30 years drug allergies several including Keflex causing hives Cipro causing hives Levoxyl lisinopril Norvasc and Tiazac  Review of Systems   All other systems reviewed and are negative.      Objective   Vitals:    05/06/19 0815   BP: 124/84   Pulse: 57   Temp: 97.6 °F (36.4 °C)   SpO2: 95%   Weight: 72.9 kg (160 lb 12.8 oz)     Physical Exam   Constitutional: He appears well-developed and well-nourished.   HENT:   Head: Normocephalic and atraumatic.   Eyes: Conjunctivae are normal. Pupils are equal, round, and reactive to light.   Cardiovascular: Normal rate, regular rhythm and normal heart sounds.   Pulmonary/Chest: Effort normal.   Abdominal: Soft. Bowel sounds are normal.   Musculoskeletal:   Left leg with mild swelling foot and leg both left lower leg is 1+ foot is 1-2+ edema posterior tibial pulses 2+.  Negative Homans negative cords no evidence of cellulitis.    Onychomycosis of toenails very deformed hypertrophic left first toenail plan for patient see podiatrist   Neurological: He is alert.    Unremarkable gait and station   Nursing note reviewed.      Lab Results   Component Value Date    INR 3.30 (A) 05/06/2019    INR 1.90 (A) 04/19/2019    INR 1.50 (A) 04/05/2019       Procedures    Assessment/Plan   1.  DVT    2.  Anticoagulation with Coumadin target range 2.5-3.0 plan downward adjust Coumadin to 5 mg 3 days with 2.5 mg for today follow-up INR in 2 weeks.    3.  Left leg edema new plans venous Doppler today to rule out DVT new    4.  Toenail deformity left foot very hypertrophic elevated toenail appears to be fungal in etiology patient needs podiatrist to try to shave off toenail etc. patient sees Dr. Ramos before if he will self refer for same.    Much of this encounter note is an electronic transcription/translation of spoken language to printed text.  The electronic translation of spoken language may permit erroneous, or at times, nonsensical words or phrases to be inadvertently transcribed.  Although I have reviewed the note for such errors, some may still exist. If there are questions or for further clarification, please contact me.

## 2019-05-14 ENCOUNTER — TELEPHONE (OUTPATIENT)
Dept: FAMILY MEDICINE CLINIC | Facility: CLINIC | Age: 74
End: 2019-05-14

## 2019-05-14 DIAGNOSIS — L60.8 TOENAIL DEFORMITY: Primary | ICD-10-CM

## 2019-05-20 ENCOUNTER — OFFICE VISIT (OUTPATIENT)
Dept: FAMILY MEDICINE CLINIC | Facility: CLINIC | Age: 74
End: 2019-05-20

## 2019-05-20 VITALS
BODY MASS INDEX: 22 KG/M2 | DIASTOLIC BLOOD PRESSURE: 80 MMHG | OXYGEN SATURATION: 91 % | WEIGHT: 162.4 LBS | TEMPERATURE: 91 F | SYSTOLIC BLOOD PRESSURE: 150 MMHG | HEIGHT: 72 IN | HEART RATE: 65 BPM

## 2019-05-20 DIAGNOSIS — I10 HYPERTENSION, ESSENTIAL: ICD-10-CM

## 2019-05-20 DIAGNOSIS — I82.4Z9 DEEP VEIN THROMBOSIS (DVT) OF DISTAL VEIN OF LOWER EXTREMITY, UNSPECIFIED CHRONICITY, UNSPECIFIED LATERALITY (HCC): ICD-10-CM

## 2019-05-20 DIAGNOSIS — Z79.01 LONG TERM CURRENT USE OF ANTICOAGULANTS WITH INR GOAL OF 2.0-3.0: Primary | ICD-10-CM

## 2019-05-20 LAB — INR PPP: 3.3 (ref 0.9–1.1)

## 2019-05-20 PROCEDURE — 85610 PROTHROMBIN TIME: CPT | Performed by: INTERNAL MEDICINE

## 2019-05-20 PROCEDURE — 36416 COLLJ CAPILLARY BLOOD SPEC: CPT | Performed by: INTERNAL MEDICINE

## 2019-05-20 PROCEDURE — 99213 OFFICE O/P EST LOW 20 MIN: CPT | Performed by: INTERNAL MEDICINE

## 2019-05-20 NOTE — PROGRESS NOTES
Subjective   Narciso Romano is a 73 y.o. male.   Patient with history of DVT on Coumadin for same target range of 2-3  History of Present Illness     DVT on Coumadin dosage range 2-3 on INR no complaints.  INR is elevated today without obvious reason as to why.  Patient change his dosage to a 2.5 /5/5/5 cycle with INR in 2 weeks time.  Blood pressure is minimally elevated will observe for now with follow-up on return  Review of Systems   All other systems reviewed and are negative.      Objective   Vitals:    05/20/19 0815   BP: 150/80   Pulse: 65   Temp: (!) 91 °F (32.8 °C)   SpO2: 91%   Weight: 73.7 kg (162 lb 6.4 oz)     Physical Exam   Constitutional: He appears well-developed and well-nourished.   HENT:   Head: Normocephalic and atraumatic.   Eyes: Conjunctivae are normal. Pupils are equal, round, and reactive to light.   Cardiovascular: Normal rate, regular rhythm and normal heart sounds.   Pulmonary/Chest: Effort normal and breath sounds normal.   Nursing note and vitals reviewed.      Lab Results   Component Value Date    INR 3.30 (A) 05/20/2019    INR 3.30 (A) 05/06/2019    INR 1.90 (A) 04/19/2019       Procedures    Assessment/Plan     1.  DVT    2.  Anticoagulation with Coumadin target range 2.1-3.0 plan reduce dosage to to 2.5/5/5/5 cycle follow-up in 2 weeks time    3.  Hypertension slightly elevated plan continue present medicine recheck in 1 week's time or 2 weeks time on return if still elevated will upward adjust medication    Much of this encounter note is an electronic transcription/translation of spoken language to printed text.  The electronic translation of spoken language may permit erroneous, or at times, nonsensical words or phrases to be inadvertently transcribed.  Although I have reviewed the note for such errors, some may still exist. If there are questions or for further clarification, please contact me.

## 2019-06-03 ENCOUNTER — OFFICE VISIT (OUTPATIENT)
Dept: FAMILY MEDICINE CLINIC | Facility: CLINIC | Age: 74
End: 2019-06-03

## 2019-06-03 VITALS
SYSTOLIC BLOOD PRESSURE: 130 MMHG | HEART RATE: 72 BPM | BODY MASS INDEX: 21.91 KG/M2 | WEIGHT: 161.8 LBS | DIASTOLIC BLOOD PRESSURE: 86 MMHG | OXYGEN SATURATION: 90 % | HEIGHT: 72 IN | TEMPERATURE: 97.7 F

## 2019-06-03 DIAGNOSIS — M79.642 HAND PAIN, LEFT: ICD-10-CM

## 2019-06-03 DIAGNOSIS — I10 HYPERTENSION, ESSENTIAL: ICD-10-CM

## 2019-06-03 DIAGNOSIS — I82.4Z9 DEEP VEIN THROMBOSIS (DVT) OF DISTAL VEIN OF LOWER EXTREMITY, UNSPECIFIED CHRONICITY, UNSPECIFIED LATERALITY (HCC): Primary | ICD-10-CM

## 2019-06-03 DIAGNOSIS — Z79.01 LONG TERM CURRENT USE OF ANTICOAGULANTS WITH INR GOAL OF 2.0-3.0: ICD-10-CM

## 2019-06-03 LAB — INR PPP: 2.7 (ref 0.9–1.1)

## 2019-06-03 PROCEDURE — 99214 OFFICE O/P EST MOD 30 MIN: CPT | Performed by: INTERNAL MEDICINE

## 2019-06-03 PROCEDURE — 73130 X-RAY EXAM OF HAND: CPT | Performed by: INTERNAL MEDICINE

## 2019-06-03 PROCEDURE — 36416 COLLJ CAPILLARY BLOOD SPEC: CPT | Performed by: INTERNAL MEDICINE

## 2019-06-03 PROCEDURE — 85610 PROTHROMBIN TIME: CPT | Performed by: INTERNAL MEDICINE

## 2019-06-03 NOTE — PROGRESS NOTES
Subjective   Narciso Romano is a 73 y.o. male.   DVT on Coumadin long-term for same.  With target range of 2.0-3.0 also recent left hand pain and swelling  History of Present Illness DVT on Coumadin with below Coumadin 4-day cycle  5/5/5/2.5 milligrams Coumadin recent hand swelling over the last few days time no injury just began swelling while driving.  Dorsum of left hand has resolved with ice.  No specific injury no history of ganglion cyst hand is better but we will probably proceed with x-ray today.  Blood pressure issatisfactory  Hand pain l family history of heart disease hypertension thyroid disease lung disease and kidney disease..  Patient's former smoker drug allergies are Keflex causing hives Cipro hives Levoxyl and fine lisinopril and fine Norvasc undefined Tiazac undefined  Review of Systems   All other systems reviewed and are negative.      Objective   Vitals:    06/03/19 0754   BP: 130/86   Pulse: 72   Temp: 97.7 °F (36.5 °C)   SpO2: 90%   Weight: 73.4 kg (161 lb 12.8 oz)     Physical Exam   Constitutional: He appears well-developed and well-nourished.   HENT:   Head: Normocephalic and atraumatic.   Eyes: Conjunctivae are normal. Pupils are equal, round, and reactive to light.   Cardiovascular: Normal rate, regular rhythm and normal heart sounds.   Pulmonary/Chest: Effort normal and breath sounds normal.   Musculoskeletal:   Left hand is nontender to palpation.  Normal /normal flexion extension of hand.  Complains of pain dorsal third and fourth metatarsals no discrete masses palpated.  Minimal swelling vaguely detected.  Left right radial pulses 2+   Neurological: He is alert.   Unremarkable gait and station   Skin: Skin is warm and dry.   Nursing note and vitals reviewed.      Lab Results   Component Value Date    INR 2.70 (A) 06/03/2019    INR 3.30 (A) 05/20/2019    INR 3.30 (A) 05/06/2019       Procedures  X-ray left hand 3 views obtained.  No comparison available.  No bony or soft tissue  normalities noted.  Assessment/Plan   1.  DVT    2.  Anticoagulation with Coumadin continue patient 5/5/5/2 0.54-day cycle  cont same inr 1mo/follow-up 1 month    3.  Left hand pain undefined patient is to continue to observe if discomfort and swelling are not totally gone in 4 days time i.e. Friday we will initiate a hand referral for him    4.  Hypertension controlled continue same    Much of this encounter note is an electronic transcription/translation of spoken language to printed text.  The electronic translation of spoken language may permit erroneous, or at times, nonsensical words or phrases to be inadvertently transcribed.  Although I have reviewed the note for such errors, some may still exist. If there are questions or for further clarification, please contact me.

## 2019-06-14 RX ORDER — TAMSULOSIN HYDROCHLORIDE 0.4 MG/1
CAPSULE ORAL
Qty: 90 CAPSULE | Refills: 3 | Status: SHIPPED | OUTPATIENT
Start: 2019-06-14 | End: 2020-07-06

## 2019-06-17 RX ORDER — ISOSORBIDE DINITRATE 20 MG/1
TABLET ORAL
Qty: 90 TABLET | Refills: 0 | Status: SHIPPED | OUTPATIENT
Start: 2019-06-17 | End: 2020-06-29 | Stop reason: DRUGHIGH

## 2019-07-01 ENCOUNTER — OFFICE VISIT (OUTPATIENT)
Dept: FAMILY MEDICINE CLINIC | Facility: CLINIC | Age: 74
End: 2019-07-01

## 2019-07-01 VITALS
DIASTOLIC BLOOD PRESSURE: 80 MMHG | SYSTOLIC BLOOD PRESSURE: 136 MMHG | HEIGHT: 72 IN | BODY MASS INDEX: 20.97 KG/M2 | HEART RATE: 60 BPM | OXYGEN SATURATION: 97 % | WEIGHT: 154.8 LBS | TEMPERATURE: 98 F

## 2019-07-01 DIAGNOSIS — J01.00 ACUTE MAXILLARY SINUSITIS, RECURRENCE NOT SPECIFIED: ICD-10-CM

## 2019-07-01 DIAGNOSIS — Z79.01 LONG TERM CURRENT USE OF ANTICOAGULANTS WITH INR GOAL OF 2.0-3.0: ICD-10-CM

## 2019-07-01 DIAGNOSIS — I82.4Z9 DEEP VEIN THROMBOSIS (DVT) OF DISTAL VEIN OF LOWER EXTREMITY, UNSPECIFIED CHRONICITY, UNSPECIFIED LATERALITY (HCC): Primary | ICD-10-CM

## 2019-07-01 LAB — INR PPP: 2.1 (ref 0.9–1.1)

## 2019-07-01 PROCEDURE — 36416 COLLJ CAPILLARY BLOOD SPEC: CPT | Performed by: INTERNAL MEDICINE

## 2019-07-01 PROCEDURE — 99213 OFFICE O/P EST LOW 20 MIN: CPT | Performed by: INTERNAL MEDICINE

## 2019-07-01 PROCEDURE — 85610 PROTHROMBIN TIME: CPT | Performed by: INTERNAL MEDICINE

## 2019-07-01 RX ORDER — AMOXICILLIN 875 MG/1
875 TABLET, COATED ORAL 2 TIMES DAILY
Qty: 20 TABLET | Refills: 0 | Status: SHIPPED | OUTPATIENT
Start: 2019-07-01 | End: 2019-07-11

## 2019-07-01 NOTE — PROGRESS NOTES
Subjective   Narciso Romano is a 73 y.o. male.   Patient with a history of recurrent DVT on Coumadin for same.  Target range 2-3 also sinus pressure drainage last few days  History of Present Illness   5/5/5//2.5 milligrams as a 4-day cycle current Coumadin dosing INR is satisfactory 2.1 he is on this for DVT patient's had recent upper respiratory congestion lungs seem to be okay now sinuses predominate over some exercises are cloudy draining and causing problems.  No associated temperature decreased hearing with this patient also has questions regarding a something of insurance requesting a different formulation of his long-acting nitroglycerin he is on Isordil given with his significant heart disease I will have his cardiologist address that but we have not received the appropriate paperwork yet to ascertain the specifics of what he is discussing.  Just this his insurance is wanting a different and preferred formulation of long-acting nitrates.  No other complaints no reported temperature compliant with medications no dietary indiscretions    Review of Systems   All other systems reviewed and are negative.      Objective   Vitals:    07/01/19 0759   BP: 136/80   Pulse: 60   Temp: 98 °F (36.7 °C)   SpO2: 97%   Weight: 70.2 kg (154 lb 12.8 oz)     Physical Exam   Constitutional: He appears well-developed and well-nourished.   HENT:   Head: Normocephalic and atraumatic.   Eyes: Conjunctivae are normal. Pupils are equal, round, and reactive to light.   Cardiovascular: Normal rate, regular rhythm and normal heart sounds.   Pulmonary/Chest: Effort normal and breath sounds normal.   Abdominal: Soft. Bowel sounds are normal.   Neurological: He is alert.   Somewhat slow but stable gait and station   Nursing note and vitals reviewed.      Lab Results   Component Value Date    INR 2.10 (A) 07/01/2019    INR 2.70 (A) 06/03/2019    INR 3.30 (A) 05/20/2019       Procedures    Assessment/Plan   1.  DVT chronic    2.   Anticoagulation with Coumadin target range 2-3 plan continue present Coumadin at 5/5/5/ 2.5 mg as a 4-day cycle follow-up INR and visit in 1 month's time    3.  Acute maxillary sinusitis this plan amoxicillin 875 twice daily for 10 days time patient is allergic to cephalosporins causing hives but can take penicillin follow-up if not well in 10 days time is needed    Much of this encounter note is an electronic transcription/translation of spoken language to printed text.  The electronic translation of spoken language may permit erroneous, or at times, nonsensical words or phrases to be inadvertently transcribed.  Although I have reviewed the note for such errors, some may still exist. If there are questions or for further clarification, please contact me.

## 2019-07-30 ENCOUNTER — OFFICE VISIT (OUTPATIENT)
Dept: FAMILY MEDICINE CLINIC | Facility: CLINIC | Age: 74
End: 2019-07-30

## 2019-07-30 VITALS
WEIGHT: 150.5 LBS | HEIGHT: 72 IN | DIASTOLIC BLOOD PRESSURE: 70 MMHG | BODY MASS INDEX: 20.39 KG/M2 | HEART RATE: 59 BPM | OXYGEN SATURATION: 94 % | TEMPERATURE: 97.6 F | SYSTOLIC BLOOD PRESSURE: 120 MMHG

## 2019-07-30 DIAGNOSIS — R68.81 EARLY SATIETY: ICD-10-CM

## 2019-07-30 DIAGNOSIS — J39.9 UPPER RESPIRATORY DISEASE: ICD-10-CM

## 2019-07-30 DIAGNOSIS — I82.4Z9 DEEP VEIN THROMBOSIS (DVT) OF DISTAL VEIN OF LOWER EXTREMITY, UNSPECIFIED CHRONICITY, UNSPECIFIED LATERALITY (HCC): Primary | ICD-10-CM

## 2019-07-30 DIAGNOSIS — R63.4 WEIGHT LOSS: ICD-10-CM

## 2019-07-30 DIAGNOSIS — Z79.01 LONG TERM CURRENT USE OF ANTICOAGULANTS WITH INR GOAL OF 2.0-3.0: ICD-10-CM

## 2019-07-30 DIAGNOSIS — I10 HYPERTENSION, ESSENTIAL: ICD-10-CM

## 2019-07-30 LAB
ALBUMIN SERPL-MCNC: 4.2 G/DL (ref 3.5–5.2)
ALBUMIN/GLOB SERPL: 1.4 G/DL
ALP SERPL-CCNC: 43 U/L (ref 39–117)
ALT SERPL W P-5'-P-CCNC: 21 U/L (ref 1–41)
AMYLASE SERPL-CCNC: 166 U/L (ref 28–100)
ANION GAP SERPL CALCULATED.3IONS-SCNC: 11.1 MMOL/L (ref 5–15)
AST SERPL-CCNC: 54 U/L (ref 1–40)
BACTERIA UR QL AUTO: NORMAL /HPF
BILIRUB SERPL-MCNC: 0.4 MG/DL (ref 0.2–1.2)
BILIRUB UR QL STRIP: NEGATIVE
BUN BLD-MCNC: 31 MG/DL (ref 8–23)
BUN/CREAT SERPL: 12.8 (ref 7–25)
CALCIUM SPEC-SCNC: 11.7 MG/DL (ref 8.6–10.5)
CHLORIDE SERPL-SCNC: 106 MMOL/L (ref 98–107)
CLARITY UR: CLEAR
CO2 SERPL-SCNC: 23.9 MMOL/L (ref 22–29)
COLOR UR: YELLOW
CREAT BLD-MCNC: 2.42 MG/DL (ref 0.76–1.27)
ERYTHROCYTE [DISTWIDTH] IN BLOOD BY AUTOMATED COUNT: 17.4 % (ref 12.3–15.4)
GFR SERPL CREATININE-BSD FRML MDRD: 32 ML/MIN/1.73
GLOBULIN UR ELPH-MCNC: 3.1 GM/DL
GLUCOSE BLD-MCNC: 80 MG/DL (ref 65–99)
GLUCOSE UR STRIP-MCNC: NEGATIVE MG/DL
HCT VFR BLD AUTO: 37.3 % (ref 37.5–51)
HGB BLD-MCNC: 11.9 G/DL (ref 13–17.7)
HGB UR QL STRIP.AUTO: NEGATIVE
INR PPP: 2.4 (ref 0.9–1.1)
KETONES UR QL STRIP: NEGATIVE
LEUKOCYTE ESTERASE UR QL STRIP.AUTO: NEGATIVE
LIPASE SERPL-CCNC: 22 U/L (ref 13–60)
LYMPHOCYTES # BLD AUTO: 1.1 10*3/MM3 (ref 0.7–3.1)
LYMPHOCYTES NFR BLD AUTO: 20.1 % (ref 19.6–45.3)
MCH RBC QN AUTO: 27.8 PG (ref 26.6–33)
MCHC RBC AUTO-ENTMCNC: 31.9 G/DL (ref 31.5–35.7)
MCV RBC AUTO: 87.1 FL (ref 79–97)
MONOCYTES # BLD AUTO: 0.2 10*3/MM3 (ref 0.1–0.9)
MONOCYTES NFR BLD AUTO: 3.9 % (ref 5–12)
NEUTROPHILS # BLD AUTO: 4.1 10*3/MM3 (ref 1.7–7)
NEUTROPHILS NFR BLD AUTO: 76 % (ref 42.7–76)
NITRITE UR QL STRIP: NEGATIVE
PH UR STRIP.AUTO: 5.5 [PH] (ref 4.6–8)
PLATELET # BLD AUTO: 209 10*3/MM3 (ref 140–450)
PMV BLD AUTO: 8.9 FL (ref 6–12)
POTASSIUM BLD-SCNC: 5.1 MMOL/L (ref 3.5–5.2)
PROT SERPL-MCNC: 7.3 G/DL (ref 6–8.5)
PROT UR QL STRIP: ABNORMAL
RBC # BLD AUTO: 4.28 10*6/MM3 (ref 4.14–5.8)
RBC # UR: NORMAL /HPF
REF LAB TEST METHOD: NORMAL
SODIUM BLD-SCNC: 141 MMOL/L (ref 136–145)
SP GR UR STRIP: 1.02 (ref 1–1.03)
SQUAMOUS #/AREA URNS HPF: NORMAL /HPF
TSH SERPL DL<=0.05 MIU/L-ACNC: 105 MIU/ML (ref 0.27–4.2)
UROBILINOGEN UR QL STRIP: ABNORMAL
WBC NRBC COR # BLD: 5.4 10*3/MM3 (ref 3.4–10.8)
WBC UR QL AUTO: NORMAL /HPF

## 2019-07-30 PROCEDURE — 36416 COLLJ CAPILLARY BLOOD SPEC: CPT | Performed by: INTERNAL MEDICINE

## 2019-07-30 PROCEDURE — 84443 ASSAY THYROID STIM HORMONE: CPT | Performed by: INTERNAL MEDICINE

## 2019-07-30 PROCEDURE — 85610 PROTHROMBIN TIME: CPT | Performed by: INTERNAL MEDICINE

## 2019-07-30 PROCEDURE — 81001 URINALYSIS AUTO W/SCOPE: CPT | Performed by: INTERNAL MEDICINE

## 2019-07-30 PROCEDURE — 80053 COMPREHEN METABOLIC PANEL: CPT | Performed by: INTERNAL MEDICINE

## 2019-07-30 PROCEDURE — 99214 OFFICE O/P EST MOD 30 MIN: CPT | Performed by: INTERNAL MEDICINE

## 2019-07-30 PROCEDURE — 85025 COMPLETE CBC W/AUTO DIFF WBC: CPT | Performed by: INTERNAL MEDICINE

## 2019-07-30 PROCEDURE — 82150 ASSAY OF AMYLASE: CPT | Performed by: INTERNAL MEDICINE

## 2019-07-30 PROCEDURE — 36415 COLL VENOUS BLD VENIPUNCTURE: CPT | Performed by: INTERNAL MEDICINE

## 2019-07-30 PROCEDURE — 71046 X-RAY EXAM CHEST 2 VIEWS: CPT | Performed by: INTERNAL MEDICINE

## 2019-07-30 PROCEDURE — 83690 ASSAY OF LIPASE: CPT | Performed by: INTERNAL MEDICINE

## 2019-07-30 NOTE — PROGRESS NOTES
Subjective   Narciso Romano is a 73 y.o. male.   History of DVT long-term anticoagulation Coumadin target range 2-3 no changes in diet etc.  No dietary indiscretions also reports weight loss  History of Present Illness   Patient here for a follow-up on DVT and Coumadin treatment there with target range of 2-3 patient's INR today is 2.4 we will have patient continue his 5/5/5/2 0.5 regimen is a 4-day cycle with recheck in 1 month patient does report some weight loss 50 pounds over the last few months he is not trying to lose weight does have some mild early satiety problems no blood in stool no vomiting no abdominal pain complaints he does have respiratory complaints as well is a past smoker more so sized minimally in the lungs we will do a chest x-ray today overall feels fair get some updated lab work to see other clues for weight loss including thyroid with early satiety he will need to see GI and he seen Dr. Franks before   15 lb wt loss eating less feels up easily cup of coffee is falling so some evidence of early satiety  Drug allergies Keflex causing hives Cipro hives Levoxyl and fine lisinopril Norvasc and Tiazac.  Patient's former smoker family history positive for heart disease, hypertension, thyroid disease, lung disease and kidney disease  Review of Systems   All other systems reviewed and are negative.      Objective   Vitals:    07/30/19 0751   BP: 120/70   Pulse: 59   Temp: 97.6 °F (36.4 °C)   SpO2: 94%   Weight: 68.3 kg (150 lb 8 oz)     Physical Exam   Constitutional: He appears well-developed and well-nourished.   HENT:   Head: Normocephalic and atraumatic.   Eyes: Conjunctivae are normal. Pupils are equal, round, and reactive to light.   Cardiovascular: Normal rate, regular rhythm and normal heart sounds.   Pulmonary/Chest: Effort normal and breath sounds normal.   Abdominal: Soft. Bowel sounds are normal.   No organomegaly nor abdominal pain   Musculoskeletal:   No increased cervical, axillary,  supra/infraclavicular, or inguinal lymph node enlargement.   Neurological: He is alert.   Unremarkable gait and station   Skin: Skin is warm and dry.   Nursing note and vitals reviewed.      Lab Results   Component Value Date    INR 2.40 (A) 07/30/2019    INR 2.10 (A) 07/01/2019    INR 2.70 (A) 06/03/2019       Procedures  Chest x-ray PA lateral obtained.  Comparison from 3/15/2019 available without change.  No active parenchymal infiltrate seen.  Does have a pacemaker present.  Mildly tortuous aorta.  No other bony or soft tissue normalities are noted.  Assessment/Plan   1.  DVT    2.  Anticoagulation with Coumadin long-term target range 2-3 plan Coumadin  5/5/5/2.5 milligrams as a 4-day cycle follow-up in 1 month's time continue same    3.  Weight loss undefined weight pending lab for further direction    4.  Early satiety refer to Dr. Franks who is seen before    5.  URI symptoms chest x-ray unremarkable unremarkable observe for now    6.  Hypertension controlled continue present medicine recheck in 6 months    Much of this encounter note is an electronic transcription/translation of spoken language to printed text.  The electronic translation of spoken language may permit erroneous, or at times, nonsensical words or phrases to be inadvertently transcribed.  Although I have reviewed the note for such errors, some may still exist. If there are questions or for further clarification, please contact me.

## 2019-07-31 DIAGNOSIS — E83.52 SERUM CALCIUM ELEVATED: ICD-10-CM

## 2019-07-31 DIAGNOSIS — E78.5 HYPERLIPIDEMIA, UNSPECIFIED HYPERLIPIDEMIA TYPE: Primary | ICD-10-CM

## 2019-07-31 RX ORDER — LEVOTHYROXINE SODIUM 0.1 MG/1
100 TABLET ORAL DAILY
Qty: 30 TABLET | Refills: 1 | Status: SHIPPED | OUTPATIENT
Start: 2019-07-31 | End: 2019-08-05 | Stop reason: DRUGHIGH

## 2019-08-05 DIAGNOSIS — E03.9 HYPOTHYROIDISM, UNSPECIFIED TYPE: Primary | ICD-10-CM

## 2019-08-05 RX ORDER — LEVOTHYROXINE SODIUM 25 UG/1
25 CAPSULE ORAL DAILY
Qty: 30 CAPSULE | Refills: 2 | Status: SHIPPED | OUTPATIENT
Start: 2019-08-05 | End: 2021-01-01 | Stop reason: SDUPTHER

## 2019-08-27 ENCOUNTER — OFFICE VISIT (OUTPATIENT)
Dept: FAMILY MEDICINE CLINIC | Facility: CLINIC | Age: 74
End: 2019-08-27

## 2019-08-27 VITALS
HEART RATE: 62 BPM | WEIGHT: 156 LBS | BODY MASS INDEX: 21.13 KG/M2 | SYSTOLIC BLOOD PRESSURE: 136 MMHG | TEMPERATURE: 98 F | HEIGHT: 72 IN | DIASTOLIC BLOOD PRESSURE: 80 MMHG | OXYGEN SATURATION: 96 %

## 2019-08-27 DIAGNOSIS — I82.4Z9 DEEP VEIN THROMBOSIS (DVT) OF DISTAL VEIN OF LOWER EXTREMITY, UNSPECIFIED CHRONICITY, UNSPECIFIED LATERALITY (HCC): ICD-10-CM

## 2019-08-27 LAB — INR PPP: 3.9 (ref 0.9–1.1)

## 2019-08-27 PROCEDURE — 85610 PROTHROMBIN TIME: CPT | Performed by: INTERNAL MEDICINE

## 2019-08-27 PROCEDURE — 36416 COLLJ CAPILLARY BLOOD SPEC: CPT | Performed by: INTERNAL MEDICINE

## 2019-08-27 PROCEDURE — 99213 OFFICE O/P EST LOW 20 MIN: CPT | Performed by: INTERNAL MEDICINE

## 2019-08-27 NOTE — PROGRESS NOTES
Subjective   Narciso Romano is a 73 y.o. male.   Patient with history of DVT long-term Coumadin with a target range of 2-3 no new complaints blood pressure satisfactory  History of Present Illness   Coumadin dose is 5/5/5/2.5 no missed doses extra doses or dietary indiscretions patient aware of INR is elevated at 3.9 without obvious reason.  Illness for DVT no other complaints pressure good no chest pain complaints    Review of Systems   All other systems reviewed and are negative.      Objective   Vitals:    08/27/19 0748   BP: 136/80   Pulse: 62   Temp: 98 °F (36.7 °C)   SpO2: 96%   Weight: 70.8 kg (156 lb)     Physical Exam   Constitutional: He appears well-developed and well-nourished.   HENT:   Head: Normocephalic and atraumatic.   Eyes: Conjunctivae are normal. Pupils are equal, round, and reactive to light.   Cardiovascular: Normal rate, regular rhythm and normal heart sounds.   Pulmonary/Chest: Effort normal and breath sounds normal.   Nursing note and vitals reviewed.      Lab Results   Component Value Date    INR 3.90 (A) 08/27/2019    INR 2.40 (A) 07/30/2019    INR 2.10 (A) 07/01/2019       Procedures    Assessment/Plan .  DVT    2.  Anticoagulation with Coumadin target range 2-3 plan Coumadin    0 milligrams today then 2.5/5/5/ as new cycle follow-up in 1 week's time    Much of this encounter note is an electronic transcription/translation of spoken language to printed text.  The electronic translation of spoken language may permit erroneous, or at times, nonsensical words or phrases to be inadvertently transcribed.  Although I have reviewed the note for such errors, some may still exist. If there are questions or for further clarification, please contact me.

## 2019-09-03 ENCOUNTER — OFFICE VISIT (OUTPATIENT)
Dept: FAMILY MEDICINE CLINIC | Facility: CLINIC | Age: 74
End: 2019-09-03

## 2019-09-03 VITALS
TEMPERATURE: 97.9 F | OXYGEN SATURATION: 95 % | HEIGHT: 72 IN | WEIGHT: 154.4 LBS | DIASTOLIC BLOOD PRESSURE: 70 MMHG | BODY MASS INDEX: 20.91 KG/M2 | HEART RATE: 66 BPM | SYSTOLIC BLOOD PRESSURE: 110 MMHG

## 2019-09-03 DIAGNOSIS — Z79.01 LONG TERM CURRENT USE OF ANTICOAGULANTS WITH INR GOAL OF 2.0-3.0: Primary | ICD-10-CM

## 2019-09-03 DIAGNOSIS — I82.4Z9 DEEP VEIN THROMBOSIS (DVT) OF DISTAL VEIN OF LOWER EXTREMITY, UNSPECIFIED CHRONICITY, UNSPECIFIED LATERALITY (HCC): ICD-10-CM

## 2019-09-03 LAB — INR PPP: 3.1 (ref 0.9–1.1)

## 2019-09-03 PROCEDURE — 85610 PROTHROMBIN TIME: CPT | Performed by: INTERNAL MEDICINE

## 2019-09-03 PROCEDURE — 36416 COLLJ CAPILLARY BLOOD SPEC: CPT | Performed by: INTERNAL MEDICINE

## 2019-09-03 PROCEDURE — 99213 OFFICE O/P EST LOW 20 MIN: CPT | Performed by: INTERNAL MEDICINE

## 2019-09-03 NOTE — PROGRESS NOTES
Subjective   Narciso Romano is a 73 y.o. male.   History of DVT long-term Coumadin for same target range 2-3  History of Present Illness on long-term Coumadin recent dose change with follow-up today INR is dropped from 3.93.1 compliant with medications we will continue this dose if he does drop down further.  And to 2.0-3.0 range blood pressure satisfactory known CAD but no chest pain complaints.  5/5/2.5  inr 1mo    Review of Systems   All other systems reviewed and are negative.      Objective   Vitals:    09/03/19 0755   BP: 110/70   Pulse: 66   Temp: 97.9 °F (36.6 °C)   SpO2: 95%   Weight: 70 kg (154 lb 6.4 oz)     Physical Exam   Constitutional: He appears well-developed and well-nourished.   HENT:   Head: Normocephalic and atraumatic.   Eyes: Conjunctivae are normal. Pupils are equal, round, and reactive to light.   Cardiovascular: Normal rate, regular rhythm and normal heart sounds.   Pulmonary/Chest: Effort normal and breath sounds normal.   Nursing note and vitals reviewed.      Lab Results   Component Value Date    INR 3.10 (A) 09/03/2019    INR 3.90 (A) 08/27/2019    INR 2.40 (A) 07/30/2019       Procedures    Assessment/Plan   1.  DVT    2.  Anticoagulation with Coumadin target range 2-3 plan continue patient's 5/5/2 0.5 regimen with follow-up INR in 1 month's time    3.  Hypertension controlled    Much of this encounter note is an electronic transcription/translation of spoken language to printed text.  The electronic translation of spoken language may permit erroneous, or at times, nonsensical words or phrases to be inadvertently transcribed.  Although I have reviewed the note for such errors, some may still exist. If there are questions or for further clarification, please contact me.

## 2019-09-05 ENCOUNTER — OFFICE VISIT (OUTPATIENT)
Dept: GASTROENTEROLOGY | Facility: CLINIC | Age: 74
End: 2019-09-05

## 2019-09-05 VITALS
DIASTOLIC BLOOD PRESSURE: 94 MMHG | BODY MASS INDEX: 20.65 KG/M2 | SYSTOLIC BLOOD PRESSURE: 170 MMHG | HEART RATE: 60 BPM | HEIGHT: 72 IN | WEIGHT: 152.5 LBS

## 2019-09-05 DIAGNOSIS — Z86.010 HISTORY OF COLON POLYPS: Primary | ICD-10-CM

## 2019-09-05 DIAGNOSIS — Z79.01 WARFARIN ANTICOAGULATION: ICD-10-CM

## 2019-09-05 DIAGNOSIS — R19.4 CHANGE IN BOWEL HABITS: ICD-10-CM

## 2019-09-05 PROCEDURE — 99214 OFFICE O/P EST MOD 30 MIN: CPT | Performed by: INTERNAL MEDICINE

## 2019-09-05 RX ORDER — SODIUM CHLORIDE, SODIUM LACTATE, POTASSIUM CHLORIDE, CALCIUM CHLORIDE 600; 310; 30; 20 MG/100ML; MG/100ML; MG/100ML; MG/100ML
30 INJECTION, SOLUTION INTRAVENOUS CONTINUOUS
Status: CANCELLED | OUTPATIENT
Start: 2019-09-05

## 2019-09-05 NOTE — PROGRESS NOTES
Subjective   Narciso Romano is a 73 y.o.. male is being seen for consultation today at the request of No ref. provider found    Chief Complaint   Patient presents with   • Weight Loss   • Abdominal Pain     Left Side   • Anorexia   • GI Problem     Change bowel habits       History of Present Illness  Patient presents with 10 to 15 pound weight loss over the last several months along with a nagging left-sided abdominal discomfort and change in bowel habit as manifested by increasing constipation.  He has no idea what may have triggered this.  He is not sure what makes it better.  He does not feel well.  He was diagnosed with deep venous thrombosis of the lower extremities and is currently under therapy for that.  Describes the symptoms as moderate.  Finally, he has a history of colon polyps and is due for checkup.    The following portions of the patient's history were reviewed and updated as appropriate: allergies, current medications, past family history, past medical history, past social history, past surgical history and problem list.      Current Outpatient Medications:   •  acyclovir (ZOVIRAX) 800 MG tablet, Take 1 tablet by mouth 5 (Five) Times a Day., Disp: 35 tablet, Rfl: 2  •  albuterol (PROVENTIL HFA;VENTOLIN HFA) 108 (90 BASE) MCG/ACT inhaler, Inhale 2 puffs every 4 (four) hours as needed for wheezing., Disp: , Rfl:   •  aspirin 81 MG EC tablet, Take 81 mg by mouth daily., Disp: , Rfl:   •  atorvastatin (LIPITOR) 20 MG tablet, Take 1 tablet by mouth Daily. For cholesterol, Disp: 30 tablet, Rfl: 1  •  azelastine (ASTELIN) 0.1 % nasal spray, 2 sprays into the nostril(s) as directed by provider 2 (Two) Times a Day. Use in each nostril as directed, Disp: 1 each, Rfl: 12  •  finasteride (PROSCAR) 5 MG tablet, Take 5 mg by mouth daily., Disp: , Rfl:   •  Fluticasone Furoate-Vilanterol (BREO ELLIPTA) 100-25 MCG/INH aerosol powder , Inhale 1 puff Daily., Disp: 1 each, Rfl: 0  •  hydrALAZINE (APRESOLINE) 100 MG  tablet, Take 1 tablet by mouth 3 (Three) Times a Day., Disp: 90 tablet, Rfl: 10  •  isosorbide dinitrate (ISORDIL) 20 MG tablet, TAKE 1 TABLET THREE TIMES A DAY, Disp: 90 tablet, Rfl: 0  •  levothyroxine sodium (TIROSINT) 25 MCG capsule, Take 1 capsule by mouth Daily. Take this amt only!!!!, Disp: 30 capsule, Rfl: 2  •  metoprolol tartrate (LOPRESSOR) 50 MG tablet, Take 1 tablet by mouth 2 (Two) Times a Day., Disp: 180 tablet, Rfl: 2  •  montelukast (SINGULAIR) 10 MG tablet, Take 1 tablet by mouth Every Night., Disp: 30 tablet, Rfl: 5  •  NIFEdipine CC (ADALAT CC) 90 MG 24 hr tablet, Take 1 tablet by mouth Daily., Disp: 90 tablet, Rfl: 3  •  nitroglycerin (NITROSTAT) 0.4 MG SL tablet, Place 1 tablet under the tongue Every 5 (Five) Minutes As Needed for Chest Pain. Take no more than 3 doses in 15 minutes., Disp: 25 tablet, Rfl: 3  •  pantoprazole (PROTONIX) 40 MG EC tablet, Take 40 mg by mouth daily., Disp: , Rfl:   •  sildenafil (REVATIO) 20 MG tablet, Take 1 tablet by mouth Daily., Disp: 10 tablet, Rfl: 3  •  tamsulosin (FLOMAX) 0.4 MG capsule 24 hr capsule, TAKE ONE CAPSULE EVERY DAY, Disp: 90 capsule, Rfl: 3  •  warfarin (COUMADIN) 5 MG tablet, Take 2 tablets by mouth Daily. (Patient taking differently: Take 5 mg by mouth Daily. HOLDING), Disp: 180 tablet, Rfl: 3  •  warfarin (COUMADIN) 5 MG tablet, TAKE 2 TABLETS EVERY DAY, Disp: 60 tablet, Rfl: 4    Family History   Problem Relation Age of Onset   • Heart disease Other    • Hypertension Other    • Thyroid disease Other    • Lung disease Other    • Kidney disease Other    • Malig Hyperthermia Neg Hx    • Colon cancer Neg Hx    • Colon polyps Neg Hx    • Liver disease Neg Hx    • Irritable bowel syndrome Neg Hx        Review of Systems   Constitutional: Positive for unexpected weight change. Negative for appetite change, diaphoresis, fatigue and fever.   HENT: Negative for hearing loss, mouth sores, sore throat and trouble swallowing.    Eyes: Negative for pain  and redness.   Respiratory: Negative for choking and shortness of breath.    Cardiovascular: Negative for chest pain and leg swelling.   Gastrointestinal: Negative for abdominal distention, abdominal pain, anal bleeding, blood in stool, constipation, diarrhea, nausea, rectal pain and vomiting.   Genitourinary: Negative for flank pain and hematuria.   Musculoskeletal: Negative for arthralgias and joint swelling.   Skin: Negative for color change and rash.   Allergic/Immunologic: Negative for food allergies and immunocompromised state.   Neurological: Negative for dizziness, seizures and headaches.   Hematological: Does not bruise/bleed easily.   Psychiatric/Behavioral: Negative for confusion, sleep disturbance and suicidal ideas. The patient is not nervous/anxious.        Objective   Physical Exam   Constitutional: He is oriented to person, place, and time. He appears well-developed and well-nourished.   HENT:   Head: Normocephalic and atraumatic.   Nose: Nose normal.   Eyes: Conjunctivae are normal. Pupils are equal, round, and reactive to light.   Neck: Normal range of motion. Neck supple. No thyromegaly present.   Cardiovascular: Normal heart sounds. Exam reveals no gallop and no friction rub.   No murmur heard.  Pulmonary/Chest: Effort normal and breath sounds normal.   Abdominal: Soft. Bowel sounds are normal. He exhibits no distension and no mass. There is no tenderness.   Musculoskeletal: He exhibits no edema.   Lymphadenopathy:     He has no cervical adenopathy.   Neurological: He is alert and oriented to person, place, and time.   Skin: No rash noted. No erythema.   Psychiatric: He has a normal mood and affect. His behavior is normal.   Nursing note and vitals reviewed.      Pertinent laboratory results were reviewed. , Pertinent old records were reviewed.  and Pertinent radiology results were reviewed.     Assessment/Plan   Problems Addressed this Visit        Digestive    Change in bowel habits        Hematopoietic and Hemostatic    Warfarin anticoagulation    Relevant Orders    Protime-INR       Other    History of colon polyps - Primary    Relevant Orders    Case Request (Completed)        We will proceed with colonoscopy to fully evaluate this.  I do not believe he should come off his warfarin and so we will do him anticoagulated.  We will check his INR 3 days prior to the procedure and if it is 2.5 or lower we will proceed as scheduled.

## 2019-09-09 PROBLEM — Z79.01 WARFARIN ANTICOAGULATION: Status: ACTIVE | Noted: 2019-09-09

## 2019-09-09 PROBLEM — Z86.010 HISTORY OF COLON POLYPS: Status: ACTIVE | Noted: 2019-09-09

## 2019-09-09 PROBLEM — R19.4 CHANGE IN BOWEL HABITS: Status: ACTIVE | Noted: 2019-09-09

## 2019-09-16 ENCOUNTER — TELEPHONE (OUTPATIENT)
Dept: GASTROENTEROLOGY | Facility: CLINIC | Age: 74
End: 2019-09-16

## 2019-09-16 NOTE — TELEPHONE ENCOUNTER
Called to schedule colonoscopy. Patient has been in [facility] for about 4 days. He is going to do some Rehab. When he is feeling better he will call. To let us know if he is going to do colonoscopy.

## 2019-10-01 ENCOUNTER — OFFICE VISIT (OUTPATIENT)
Dept: FAMILY MEDICINE CLINIC | Facility: CLINIC | Age: 74
End: 2019-10-01

## 2019-10-01 VITALS
OXYGEN SATURATION: 93 % | TEMPERATURE: 98 F | DIASTOLIC BLOOD PRESSURE: 76 MMHG | HEART RATE: 62 BPM | HEIGHT: 72 IN | WEIGHT: 159.8 LBS | SYSTOLIC BLOOD PRESSURE: 136 MMHG | BODY MASS INDEX: 21.64 KG/M2

## 2019-10-01 DIAGNOSIS — M10.00 IDIOPATHIC GOUT, UNSPECIFIED CHRONICITY, UNSPECIFIED SITE: ICD-10-CM

## 2019-10-01 DIAGNOSIS — I50.9 CHRONIC CONGESTIVE HEART FAILURE, UNSPECIFIED HEART FAILURE TYPE (HCC): ICD-10-CM

## 2019-10-01 DIAGNOSIS — Z79.01 LONG TERM CURRENT USE OF ANTICOAGULANTS WITH INR GOAL OF 2.0-3.0: Primary | ICD-10-CM

## 2019-10-01 DIAGNOSIS — I82.4Z9 DEEP VEIN THROMBOSIS (DVT) OF DISTAL VEIN OF LOWER EXTREMITY, UNSPECIFIED CHRONICITY, UNSPECIFIED LATERALITY (HCC): ICD-10-CM

## 2019-10-01 DIAGNOSIS — N18.4 CRD (CHRONIC RENAL DISEASE), STAGE IV (HCC): ICD-10-CM

## 2019-10-01 LAB
ANION GAP SERPL CALCULATED.3IONS-SCNC: 12.5 MMOL/L (ref 5–15)
BUN BLD-MCNC: 28 MG/DL (ref 8–23)
BUN/CREAT SERPL: 13.9 (ref 7–25)
CALCIUM SPEC-SCNC: 10 MG/DL (ref 8.6–10.5)
CHLORIDE SERPL-SCNC: 105 MMOL/L (ref 98–107)
CO2 SERPL-SCNC: 23.5 MMOL/L (ref 22–29)
CREAT BLD-MCNC: 2.01 MG/DL (ref 0.76–1.27)
GFR SERPL CREATININE-BSD FRML MDRD: 40 ML/MIN/1.73
GLUCOSE BLD-MCNC: 75 MG/DL (ref 65–99)
INR PPP: 1.7 (ref 0.9–1.1)
NT-PROBNP SERPL-MCNC: ABNORMAL PG/ML (ref 5–900)
POTASSIUM BLD-SCNC: 4.6 MMOL/L (ref 3.5–5.2)
SODIUM BLD-SCNC: 141 MMOL/L (ref 136–145)

## 2019-10-01 PROCEDURE — 99214 OFFICE O/P EST MOD 30 MIN: CPT | Performed by: INTERNAL MEDICINE

## 2019-10-01 PROCEDURE — 36415 COLL VENOUS BLD VENIPUNCTURE: CPT | Performed by: INTERNAL MEDICINE

## 2019-10-01 PROCEDURE — 85610 PROTHROMBIN TIME: CPT | Performed by: INTERNAL MEDICINE

## 2019-10-01 PROCEDURE — 83880 ASSAY OF NATRIURETIC PEPTIDE: CPT | Performed by: INTERNAL MEDICINE

## 2019-10-01 PROCEDURE — 80048 BASIC METABOLIC PNL TOTAL CA: CPT | Performed by: INTERNAL MEDICINE

## 2019-10-01 RX ORDER — HYDROCODONE BITARTRATE AND ACETAMINOPHEN 7.5; 325 MG/1; MG/1
TABLET ORAL
Refills: 0 | COMMUNITY
Start: 2019-09-20 | End: 2020-01-01 | Stop reason: DRUGHIGH

## 2019-10-01 RX ORDER — COLCHICINE 0.6 MG/1
0.6 TABLET ORAL DAILY
Qty: 30 TABLET | Refills: 0 | Status: SHIPPED | OUTPATIENT
Start: 2019-10-01 | End: 2019-11-07 | Stop reason: SDUPTHER

## 2019-10-01 NOTE — PROGRESS NOTES
Subjective   Narciso Romano is a 73 y.o. male.   Patient is on Coumadin for DVT is here for follow-up today.  Target range is 2-3..  Recent was held for several days duration of hospital stay.  History of Present Illness DVT long-term anti-correlation with Coumadin target range 2-3 for same.  Follow-up today did have Coumadin held for several days time during hospitalization around 914 he was hospitalized for 3 days time with CHF was placed on diuretics which improved his breathing he did have a subsequent visit back to the ER on 919 with acute gout attack left knee no history of this felt to be diuretic induced.  Got around steroids with the 75% better but still some soreness and mild swelling he is felt to be stage IV on his chronic renal disease now to repeat on that did ask about getting flu shot possible pneumonia shot today he is probably too soon after completing course of antibiotics will probably be able to get that done on follow-up visit in 2 weeks time.  No other complaints at this point time breathing feels good does need to follow-up with Dr. Bailey his nephrologist.  Back  On 5/5/5/2 .5 Coumadin has a 4-day cycle  .  Keflex causing hives Cipro same Levoxyl lisinopril as cause hives no rash causing hives diltiazem undefined.  Patient is a former smoker.  Family history positive for heart disease hypertension thyroid disease lung disease kidney disease   Review of Systems   All other systems reviewed and are negative.      Objective   Vitals:    10/01/19 0755   BP: 136/76   Pulse: 62   Temp: 98 °F (36.7 °C)   SpO2: 93%   Weight: 72.5 kg (159 lb 12.8 oz)     Physical Exam   Constitutional: He appears well-developed and well-nourished.   HENT:   Head: Normocephalic and atraumatic.   Eyes: Conjunctivae are normal. Pupils are equal, round, and reactive to light.   Cardiovascular: Normal rate, regular rhythm and normal heart sounds.   Pulmonary/Chest: Effort normal and breath sounds normal.   No rales or  wheezes heard patient seems dry breathing is fair.   Abdominal: Soft. Bowel sounds are normal.   Musculoskeletal:   Right knee with mild effusion still.  Recent gout attack by history   Neurological: He is alert.   Slow but stable gait and station   Skin: Skin is warm and dry.   Nursing note and vitals reviewed.      Lab Results   Component Value Date    INR 1.70 (A) 10/01/2019    INR 3.10 (A) 09/03/2019    INR 3.90 (A) 08/27/2019       Procedures    Assessment/Plan  1.  DVT    2.  A anticoagulation with Coumadin plan Coumadin      7.5 mg today then resume the 5/5/5/2 0.5 mg as a 4 day cycle follow-up in 2 weeks time    3.  CHF recent exacerbation get updated BNP breathing is back to normal his standards    4.  Crd stage IV get updated BMP    5.  Gout left knee syndrome sent better after a round of steroids plan will let patient try colchicine 0.6 mg daily if side effects do occur he is to let us know we will consider another course of antibiotics if not tolerating colchicine patient is also due to follow-up with his nephrologist.    Much of this encounter note is an electronic transcription/translation of spoken language to printed text.  The electronic translation of spoken language may permit erroneous, or at times, nonsensical words or phrases to be inadvertently transcribed.  Although I have reviewed the note for such errors, some may still exist. If there are questions or for further clarification, please contact me.

## 2019-10-03 RX ORDER — WARFARIN SODIUM 5 MG/1
10 TABLET ORAL DAILY
Qty: 180 TABLET | Refills: 3 | Status: SHIPPED | OUTPATIENT
Start: 2019-10-03 | End: 2020-01-10 | Stop reason: SDUPTHER

## 2019-10-17 ENCOUNTER — OFFICE VISIT (OUTPATIENT)
Dept: FAMILY MEDICINE CLINIC | Facility: CLINIC | Age: 74
End: 2019-10-17

## 2019-10-17 VITALS
BODY MASS INDEX: 22.81 KG/M2 | OXYGEN SATURATION: 90 % | TEMPERATURE: 97.6 F | HEIGHT: 72 IN | HEART RATE: 66 BPM | DIASTOLIC BLOOD PRESSURE: 74 MMHG | SYSTOLIC BLOOD PRESSURE: 148 MMHG | WEIGHT: 168.4 LBS

## 2019-10-17 DIAGNOSIS — I50.20 SYSTOLIC CONGESTIVE HEART FAILURE, UNSPECIFIED HF CHRONICITY (HCC): ICD-10-CM

## 2019-10-17 DIAGNOSIS — Z79.01 LONG TERM CURRENT USE OF ANTICOAGULANTS WITH INR GOAL OF 2.0-3.0: ICD-10-CM

## 2019-10-17 DIAGNOSIS — I82.4Z9 DEEP VEIN THROMBOSIS (DVT) OF DISTAL VEIN OF LOWER EXTREMITY, UNSPECIFIED CHRONICITY, UNSPECIFIED LATERALITY (HCC): ICD-10-CM

## 2019-10-17 DIAGNOSIS — R06.02 SHORTNESS OF BREATH: ICD-10-CM

## 2019-10-17 DIAGNOSIS — R60.0 BILATERAL LEG EDEMA: ICD-10-CM

## 2019-10-17 DIAGNOSIS — R07.9 CHEST PAIN, UNSPECIFIED TYPE: Primary | ICD-10-CM

## 2019-10-17 LAB — INR PPP: 3.4 (ref 0.9–1.1)

## 2019-10-17 PROCEDURE — 85610 PROTHROMBIN TIME: CPT | Performed by: INTERNAL MEDICINE

## 2019-10-17 PROCEDURE — 93000 ELECTROCARDIOGRAM COMPLETE: CPT | Performed by: INTERNAL MEDICINE

## 2019-10-17 PROCEDURE — 36416 COLLJ CAPILLARY BLOOD SPEC: CPT | Performed by: INTERNAL MEDICINE

## 2019-10-17 PROCEDURE — 99214 OFFICE O/P EST MOD 30 MIN: CPT | Performed by: INTERNAL MEDICINE

## 2019-10-17 NOTE — PROGRESS NOTES
Subjective   Narciso Romano is a 73 y.o. male.   Shortness of breath chest discomfort leg swelling for last several days time known history of CHF needs INR check long-term Coumadin for DVT.  History of Present Illness INR today is 3.4 here for checking DVT long-term Coumadin for same somewhat recent hospitalization at [facility] do not have those EKGs I do have a comparison EKG from a year ago with changes of mild discomfort more so shortness of breath with activity is having some difficulty laying down at night and has significant leg edema bilaterally.  Does have crd stage IV by recent diagnosis still urinating without particular odor to urine concern is chest pain and known cardiac patient dialysis mild chest discomfort and increased shortness of breath with leg edema patient with known CHF and worsening renal function.  Will have him go to the ER.  inr 3.4    Review of Systems   All other systems reviewed and are negative.      Objective   Vitals:    10/17/19 0759   BP: 148/74   Pulse: 66   Temp: 97.6 °F (36.4 °C)   SpO2: 90%   Weight: 76.4 kg (168 lb 6.4 oz)     Physical Exam   Constitutional: He appears well-developed and well-nourished.   HENT:   Head: Normocephalic and atraumatic.   Eyes: Conjunctivae are normal. Pupils are equal, round, and reactive to light.   Cardiovascular: Normal rate and regular rhythm.   Grade 2 murmur has regular rate   Pulmonary/Chest: Effort normal and breath sounds normal.   No rales or wheezes noted.   Abdominal: Soft. Bowel sounds are normal.   Musculoskeletal:   Bilateral leg edema 2-3+ left leg worse than right leg no cords negative Homans no increase heat to suggest cellulitis.   Neurological: He is alert.   Slow but stable gait and station   Skin: Skin is dry.   Nursing note and vitals reviewed.      Lab Results   Component Value Date    INR 3.40 (A) 10/17/2019    INR 1.70 (A) 10/01/2019    INR 3.10 (A) 09/03/2019         ECG 12 Lead  Date/Time: 10/17/2019 8:19  AM  Performed by: Jacinto Black Jr., MD  Authorized by: Jacinto Black Jr., MD   Comparison: compared with previous ECG   Comparison to previous ECG: EKG today shows T wave inversion in lead I and aVL as well as T wave inversion V5 and 6 with ST depression V6 clear-cut change for comparison I have it from 9/19/2018.  Which showed basically normal sinus rhythm with T wave inversion in the 1 with occasional unifocal PVC  Rhythm: sinus rhythm  Rate: normal  ST Depression: V6  T inversion: I, aVL, V5 and V6    Clinical impression: abnormal EKG  Comments: Abnormal EKG with T wave inversion in lead I, aVL, V5 and V6 with ST depression in V6 change from comparison 9/19/2018 showing T wave inversion in lead V1 with unifocal PVCs today's rhythm is sinus.  Rate is 64.  OH interval is 192 MS, QRS duration is 100 MS, QT intervals 416 MS impression was very abnormal EKG change from ER we are directing patient to ER            Assessment/Plan   1.  DVT    2.  Anticoagulation with Coumadin target range 2-3 INR today is 3.4 plan hold Coumadin today and change dosage to a 2. 5/5/5 as 3-day cycle follow-up on Monday    3.  Chest pain in setting of known coronary disease plan go to ER    4.  Shortness of breath/known CHF again go to ER    5.  Bilateral leg edema several possibilities including worsening renal function favor this being a CHF exacerbation ER for this as well.    6.  Abnormal EKG with change from comparison available to me again go to ER    Much of this encounter note is an electronic transcription/translation of spoken language to printed text.  The electronic translation of spoken language may permit erroneous, or at times, nonsensical words or phrases to be inadvertently transcribed.  Although I have reviewed the note for such errors, some may still exist. If there are questions or for further clarification, please contact me.

## 2019-11-08 RX ORDER — COLCHICINE 0.6 MG/1
TABLET, FILM COATED ORAL
Qty: 30 TABLET | Refills: 0 | Status: SHIPPED | OUTPATIENT
Start: 2019-11-08 | End: 2019-12-10 | Stop reason: SDUPTHER

## 2019-11-14 ENCOUNTER — OFFICE VISIT (OUTPATIENT)
Dept: FAMILY MEDICINE CLINIC | Facility: CLINIC | Age: 74
End: 2019-11-14

## 2019-11-14 VITALS
HEART RATE: 66 BPM | WEIGHT: 158.4 LBS | TEMPERATURE: 97.6 F | HEIGHT: 72 IN | SYSTOLIC BLOOD PRESSURE: 142 MMHG | OXYGEN SATURATION: 95 % | DIASTOLIC BLOOD PRESSURE: 80 MMHG | BODY MASS INDEX: 21.45 KG/M2

## 2019-11-14 DIAGNOSIS — Z79.01 LONG TERM CURRENT USE OF ANTICOAGULANTS WITH INR GOAL OF 2.0-3.0: ICD-10-CM

## 2019-11-14 DIAGNOSIS — J01.00 ACUTE MAXILLARY SINUSITIS, RECURRENCE NOT SPECIFIED: ICD-10-CM

## 2019-11-14 DIAGNOSIS — I82.4Z9 DEEP VEIN THROMBOSIS (DVT) OF DISTAL VEIN OF LOWER EXTREMITY, UNSPECIFIED CHRONICITY, UNSPECIFIED LATERALITY (HCC): Primary | ICD-10-CM

## 2019-11-14 DIAGNOSIS — I10 HYPERTENSION, ESSENTIAL: ICD-10-CM

## 2019-11-14 DIAGNOSIS — N18.4 STAGE 4 CHRONIC KIDNEY DISEASE (HCC): ICD-10-CM

## 2019-11-14 LAB — INR PPP: 2.3 (ref 0.9–1.1)

## 2019-11-14 PROCEDURE — G0008 ADMIN INFLUENZA VIRUS VAC: HCPCS | Performed by: INTERNAL MEDICINE

## 2019-11-14 PROCEDURE — 90653 IIV ADJUVANT VACCINE IM: CPT | Performed by: INTERNAL MEDICINE

## 2019-11-14 PROCEDURE — 90732 PPSV23 VACC 2 YRS+ SUBQ/IM: CPT | Performed by: INTERNAL MEDICINE

## 2019-11-14 PROCEDURE — 36416 COLLJ CAPILLARY BLOOD SPEC: CPT | Performed by: INTERNAL MEDICINE

## 2019-11-14 PROCEDURE — 99214 OFFICE O/P EST MOD 30 MIN: CPT | Performed by: INTERNAL MEDICINE

## 2019-11-14 PROCEDURE — G0009 ADMIN PNEUMOCOCCAL VACCINE: HCPCS | Performed by: INTERNAL MEDICINE

## 2019-11-14 PROCEDURE — 85610 PROTHROMBIN TIME: CPT | Performed by: INTERNAL MEDICINE

## 2019-11-14 RX ORDER — BUMETANIDE 2 MG/1
TABLET ORAL
Refills: 11 | COMMUNITY
Start: 2019-10-20

## 2019-11-14 RX ORDER — AMOXICILLIN 875 MG/1
875 TABLET, COATED ORAL 2 TIMES DAILY
Qty: 20 TABLET | Refills: 0 | Status: SHIPPED | OUTPATIENT
Start: 2019-11-14 | End: 2019-11-24

## 2019-11-14 RX ORDER — CARVEDILOL 12.5 MG/1
TABLET ORAL
Refills: 11 | COMMUNITY
Start: 2019-10-20 | End: 2020-06-29

## 2019-11-14 NOTE — PROGRESS NOTES
Subjective   Narciso Romano is a 73 y.o. male.  Follow-up on DVT on Coumadin for same, get updated labs for crd stage IV recent heart catheterization  Body mass index is 21.48 kg/m².  History of Present Illness   Patient on DVT Coumadin for same target range 2.0-3.0.  Recent hospitalization with angina with a new heart catheterization is being treated medically for this.  This done at [facility]  With a heart cath that did not get updated lab for his existing crd stage III-IV.  Blood pressure is been running okay at home we got 142/80 we will have patient continue to monitor that still up we will upward adjust his meds because readings in couple weeks patient also having some sinus drainage as well.  Now cloudy and green has been exposed to strep throat with 2 grandchildren but no sore throat complaints or increased temperature.  Can take amoxicillin he is allergic to cephalosporins.  5/5/5/2.5 milligrams 4-day cycle Coumadin current regimen.  Patient's former smoker drug allergies include cephalosporins causing hives.  Cipro hives locks on fine lisinopril hives Norvasc hives and Tiazac family is positive heart disease hypertension thyroid disease lung disease kidney disease  Review of Systems   All other systems reviewed and are negative.      Objective   Vitals:    11/14/19 0759   BP: 142/80   Pulse: 66   Temp: 97.6 °F (36.4 °C)   SpO2: 95%   Weight: 71.8 kg (158 lb 6.4 oz)     Physical Exam   Constitutional: He appears well-developed and well-nourished.   HENT:   Head: Normocephalic and atraumatic.   Right Ear: External ear normal.   Left Ear: External ear normal.   Mouth/Throat: Oropharynx is clear and moist.   Mild pressure over maxillary sinuses.   Eyes: Conjunctivae are normal. Pupils are equal, round, and reactive to light.   Cardiovascular: Normal rate, regular rhythm and normal heart sounds.   Pulmonary/Chest: Effort normal and breath sounds normal.   Abdominal: Soft. Bowel sounds are normal.    Neurological: He is alert.   Unremarkable gait and station   Skin: Skin is warm and dry.   Nursing note and vitals reviewed.      Lab Results   Component Value Date    INR 2.30 (A) 11/14/2019    INR 3.40 (A) 10/17/2019    INR 1.70 (A) 10/01/2019       Procedures    Assessment/Plan   1.  DVT    2.  Anticoagulation with Coumadin with INR of 2.3 today continue his 5/5/5/2.5 Coumadin regimen repeat INR 1 month    3.  Hypertension continue monitor at home satisfactory patient's checking will have him call in readings in couple weeks time    4.  Crd stage III/IV get updated values    5.  Maxillary sinusitis amoxicillin 875 twice daily for 10 days time follow-up or call if not well in 10 days time      Much of this encounter note is an electronic transcription/translation of spoken language to printed text.  The electronic translation of spoken language may permit erroneous, or at times, nonsensical words or phrases to be inadvertently transcribed.  Although I have reviewed the note for such errors, some may still exist. If there are questions or for further clarification, please contact me.

## 2019-12-10 RX ORDER — COLCHICINE 0.6 MG/1
TABLET, FILM COATED ORAL
Qty: 30 TABLET | Refills: 0 | Status: SHIPPED | OUTPATIENT
Start: 2019-12-10 | End: 2019-12-13 | Stop reason: SDUPTHER

## 2019-12-12 ENCOUNTER — TELEPHONE (OUTPATIENT)
Dept: FAMILY MEDICINE CLINIC | Facility: CLINIC | Age: 74
End: 2019-12-12

## 2019-12-12 ENCOUNTER — OFFICE VISIT (OUTPATIENT)
Dept: FAMILY MEDICINE CLINIC | Facility: CLINIC | Age: 74
End: 2019-12-12

## 2019-12-12 VITALS
BODY MASS INDEX: 20.45 KG/M2 | HEIGHT: 72 IN | TEMPERATURE: 98.9 F | WEIGHT: 151 LBS | HEART RATE: 66 BPM | DIASTOLIC BLOOD PRESSURE: 70 MMHG | OXYGEN SATURATION: 97 % | RESPIRATION RATE: 18 BRPM | SYSTOLIC BLOOD PRESSURE: 124 MMHG

## 2019-12-12 DIAGNOSIS — Z79.01 LONG TERM CURRENT USE OF ANTICOAGULANTS WITH INR GOAL OF 2.0-3.0: ICD-10-CM

## 2019-12-12 DIAGNOSIS — I82.4Z9 DEEP VEIN THROMBOSIS (DVT) OF DISTAL VEIN OF LOWER EXTREMITY, UNSPECIFIED CHRONICITY, UNSPECIFIED LATERALITY (HCC): ICD-10-CM

## 2019-12-12 DIAGNOSIS — E03.9 HYPOTHYROIDISM, UNSPECIFIED TYPE: ICD-10-CM

## 2019-12-12 DIAGNOSIS — Z87.09 HISTORY OF PLEURAL EFFUSION: ICD-10-CM

## 2019-12-12 DIAGNOSIS — N18.4 CRD (CHRONIC RENAL DISEASE), STAGE IV (HCC): ICD-10-CM

## 2019-12-12 DIAGNOSIS — R06.00 DYSPNEA, UNSPECIFIED TYPE: Primary | ICD-10-CM

## 2019-12-12 DIAGNOSIS — I50.20 SYSTOLIC CONGESTIVE HEART FAILURE, UNSPECIFIED HF CHRONICITY (HCC): ICD-10-CM

## 2019-12-12 DIAGNOSIS — E83.52 SERUM CALCIUM ELEVATED: ICD-10-CM

## 2019-12-12 LAB
ANION GAP SERPL CALCULATED.3IONS-SCNC: 12.3 MMOL/L (ref 5–15)
BUN BLD-MCNC: 26 MG/DL (ref 8–23)
BUN/CREAT SERPL: 9.6 (ref 7–25)
CA-I BLD-MCNC: 6 MG/DL (ref 4.6–5.4)
CA-I SERPL ISE-MCNC: 1.5 MMOL/L (ref 1.15–1.35)
CALCIUM SPEC-SCNC: 12 MG/DL (ref 8.6–10.5)
CHLORIDE SERPL-SCNC: 100 MMOL/L (ref 98–107)
CO2 SERPL-SCNC: 29.7 MMOL/L (ref 22–29)
CREAT BLD-MCNC: 2.71 MG/DL (ref 0.76–1.27)
ERYTHROCYTE [DISTWIDTH] IN BLOOD BY AUTOMATED COUNT: 17.6 % (ref 12.3–15.4)
GFR SERPL CREATININE-BSD FRML MDRD: 28 ML/MIN/1.73
GLUCOSE BLD-MCNC: 83 MG/DL (ref 65–99)
HCT VFR BLD AUTO: 41.7 % (ref 37.5–51)
HGB BLD-MCNC: 13.3 G/DL (ref 13–17.7)
INR PPP: 2.1 (ref 0.9–1.1)
LYMPHOCYTES # BLD AUTO: 1.1 10*3/MM3 (ref 0.7–3.1)
LYMPHOCYTES NFR BLD AUTO: 20.1 % (ref 19.6–45.3)
MCH RBC QN AUTO: 27.8 PG (ref 26.6–33)
MCHC RBC AUTO-ENTMCNC: 31.9 G/DL (ref 31.5–35.7)
MCV RBC AUTO: 87.1 FL (ref 79–97)
MONOCYTES # BLD AUTO: 0.2 10*3/MM3 (ref 0.1–0.9)
MONOCYTES NFR BLD AUTO: 3.3 % (ref 5–12)
NEUTROPHILS # BLD AUTO: 4.1 10*3/MM3 (ref 1.7–7)
NEUTROPHILS NFR BLD AUTO: 76.6 % (ref 42.7–76)
NT-PROBNP SERPL-MCNC: ABNORMAL PG/ML (ref 5–900)
PLATELET # BLD AUTO: 255 10*3/MM3 (ref 140–450)
PMV BLD AUTO: 8.3 FL (ref 6–12)
POTASSIUM BLD-SCNC: 4.5 MMOL/L (ref 3.5–5.2)
RBC # BLD AUTO: 4.78 10*6/MM3 (ref 4.14–5.8)
SODIUM BLD-SCNC: 142 MMOL/L (ref 136–145)
TSH SERPL DL<=0.05 MIU/L-ACNC: 111 UIU/ML (ref 0.27–4.2)
WBC NRBC COR # BLD: 5.3 10*3/MM3 (ref 3.4–10.8)

## 2019-12-12 PROCEDURE — 82330 ASSAY OF CALCIUM: CPT | Performed by: INTERNAL MEDICINE

## 2019-12-12 PROCEDURE — 36415 COLL VENOUS BLD VENIPUNCTURE: CPT | Performed by: INTERNAL MEDICINE

## 2019-12-12 PROCEDURE — 85610 PROTHROMBIN TIME: CPT | Performed by: INTERNAL MEDICINE

## 2019-12-12 PROCEDURE — 85025 COMPLETE CBC W/AUTO DIFF WBC: CPT | Performed by: INTERNAL MEDICINE

## 2019-12-12 PROCEDURE — 87040 BLOOD CULTURE FOR BACTERIA: CPT | Performed by: INTERNAL MEDICINE

## 2019-12-12 PROCEDURE — 80048 BASIC METABOLIC PNL TOTAL CA: CPT | Performed by: INTERNAL MEDICINE

## 2019-12-12 PROCEDURE — 36416 COLLJ CAPILLARY BLOOD SPEC: CPT | Performed by: INTERNAL MEDICINE

## 2019-12-12 PROCEDURE — 71046 X-RAY EXAM CHEST 2 VIEWS: CPT | Performed by: INTERNAL MEDICINE

## 2019-12-12 PROCEDURE — 83880 ASSAY OF NATRIURETIC PEPTIDE: CPT | Performed by: INTERNAL MEDICINE

## 2019-12-12 PROCEDURE — 99214 OFFICE O/P EST MOD 30 MIN: CPT | Performed by: INTERNAL MEDICINE

## 2019-12-12 PROCEDURE — 84443 ASSAY THYROID STIM HORMONE: CPT | Performed by: INTERNAL MEDICINE

## 2019-12-12 RX ORDER — CLINDAMYCIN HYDROCHLORIDE 300 MG/1
300 CAPSULE ORAL 3 TIMES DAILY
Qty: 30 CAPSULE | Refills: 0 | Status: SHIPPED | OUTPATIENT
Start: 2019-12-12 | End: 2019-12-22

## 2019-12-12 NOTE — TELEPHONE ENCOUNTER
Per Dr. Black call [facility] and make sure clindamycin 300 mg tid is ok for pt due to he has stage 4 renal disease. Called and spoke with Duy, and he said it is ok.

## 2019-12-12 NOTE — PROGRESS NOTES
Subjective   Narciso Romano is a 74 y.o. male.  Follow-up on INR history of DVT anticoagulation for same some increased shortness of breath recently no COPD  Body mass index is 20.48 kg/m².  History of Present Illness DVT on Coumadin dose at 5/5/2 0.5 INR is 2.1 today patient continue same pending follow-up will be tomorrow or Monday has been going to give him antibiotics today increased shortness of breath history of pleural effusion pneumonia COPD and CHF.  Did have pleural effusion in September no increased temperature minimal sputum just more short of breath.  Sob needs follow-up and elevated calcium and abnormal TSH as well.  CRD stage IV  Hx pleural effusion chf  Hx pneu copd  Blood pressure satisfactory no other complaints.  Several drug allergies including Cipro Keflex Levoxyl lisinopril causing hives Norvasc causing hives Tiazac undefined.  Patient's former smoker family is positive heart disease hypertension thyroid disease lung disease and kidney disease  Review of Systems   Respiratory: Positive for shortness of breath.    All other systems reviewed and are negative.      Objective   Vitals:    12/12/19 0748   BP: 124/70   Pulse: 66   Resp: 18   Temp: 98.9 °F (37.2 °C)   SpO2: 97%   Weight: 68.5 kg (151 lb)     Physical Exam   Constitutional: He appears well-developed and well-nourished.   HENT:   Head: Normocephalic and atraumatic.   Right Ear: External ear normal.   Left Ear: External ear normal.   Mouth/Throat: Oropharynx is clear and moist.   Eyes: Pupils are equal, round, and reactive to light. Conjunctivae are normal.   Cardiovascular: Normal rate, regular rhythm and normal heart sounds.   Pulmonary/Chest: Effort normal.   Few rales left lung base posteriorly.  Diminished breath sounds left lung base history of left-sided pleural effusion September 2019 as well.  No wheezes heard either side.   Abdominal: Soft. Bowel sounds are normal.   Musculoskeletal:   No significant leg edema.    Neurological: He is alert.   Slow but steady gait and station   Skin: Skin is warm and dry.   Nursing note and vitals reviewed.      Lab Results   Component Value Date    INR 2.10 (A) 12/12/2019    INR 2.30 (A) 11/14/2019    INR 3.40 (A) 10/17/2019       Procedures  Chest x-ray paralateral obtained.  Evidence of pacemaker.  On the PA and lateral view both some increased markings right lower lung field slightly posterior.  This does seem to be subtle increase from comparison from 7/30/19 no other changes are noted.  We will have radiology over read this as well.  Assessment/Plan   .  1.  DVT    2.  Anticoagulation with Coumadin target range 2-3 plan continue 5/5/2 0.5 cycle 10 of INR follow-up either tomorrow or Monday    3.  Pneumonitis plan clindamycin 300 3 times daily for 10 days time awaiting sputum blood cultures which are patient can produce today go to ER follow-up if condition should worsen in the interim.    4.  History of pleural effusion which chest x-ray do not reflect today    5.  CHF on diuretic currently reduced ejection fraction 42%    2.  Elevated calcium get ionized calcium    7.  CRD stage IV get updated BMP    8.  Hypothyroidism get updated TSH as well    Much of this encounter note is an electronic transcription/translation of spoken language to printed text.  The electronic translation of spoken language may permit erroneous, or at times, nonsensical words or phrases to be inadvertently transcribed.  Although I have reviewed the note for such errors, some may still exist. If there are questions or for further clarification, please contact me.

## 2019-12-13 ENCOUNTER — TELEPHONE (OUTPATIENT)
Dept: FAMILY MEDICINE CLINIC | Facility: CLINIC | Age: 74
End: 2019-12-13

## 2019-12-13 ENCOUNTER — OFFICE VISIT (OUTPATIENT)
Dept: FAMILY MEDICINE CLINIC | Facility: CLINIC | Age: 74
End: 2019-12-13

## 2019-12-13 VITALS
OXYGEN SATURATION: 93 % | WEIGHT: 150.8 LBS | BODY MASS INDEX: 20.42 KG/M2 | DIASTOLIC BLOOD PRESSURE: 82 MMHG | TEMPERATURE: 97.6 F | HEIGHT: 72 IN | SYSTOLIC BLOOD PRESSURE: 126 MMHG | HEART RATE: 66 BPM

## 2019-12-13 DIAGNOSIS — I82.4Z9 DEEP VEIN THROMBOSIS (DVT) OF DISTAL VEIN OF LOWER EXTREMITY, UNSPECIFIED CHRONICITY, UNSPECIFIED LATERALITY (HCC): ICD-10-CM

## 2019-12-13 DIAGNOSIS — Z79.01 LONG TERM CURRENT USE OF ANTICOAGULANTS WITH INR GOAL OF 2.0-3.0: ICD-10-CM

## 2019-12-13 DIAGNOSIS — I50.9 ACUTE CONGESTIVE HEART FAILURE, UNSPECIFIED HEART FAILURE TYPE (HCC): ICD-10-CM

## 2019-12-13 DIAGNOSIS — N18.4 CRD (CHRONIC RENAL DISEASE), STAGE IV (HCC): ICD-10-CM

## 2019-12-13 DIAGNOSIS — J18.9 PNEUMONITIS: Primary | ICD-10-CM

## 2019-12-13 LAB — INR PPP: 2.3 (ref 0.9–1.1)

## 2019-12-13 PROCEDURE — 99214 OFFICE O/P EST MOD 30 MIN: CPT | Performed by: INTERNAL MEDICINE

## 2019-12-13 PROCEDURE — 85610 PROTHROMBIN TIME: CPT | Performed by: INTERNAL MEDICINE

## 2019-12-13 PROCEDURE — 36416 COLLJ CAPILLARY BLOOD SPEC: CPT | Performed by: INTERNAL MEDICINE

## 2019-12-13 RX ORDER — COLCHICINE 0.6 MG/1
0.6 TABLET ORAL DAILY
Qty: 30 TABLET | Refills: 0 | Status: SHIPPED | OUTPATIENT
Start: 2019-12-13 | End: 2020-01-02 | Stop reason: SDUPTHER

## 2019-12-13 NOTE — PROGRESS NOTES
Subjective  Follow-up on pneumonia doing slightly better.  Breathing is better also follow-up on INR as well he is on Coumadin for DVT  Narciso Romano is a 74 y.o. male. Body mass index is 20.45 kg/m².  History of Present Illness   Pneumonia probably right lower lobe doing better with clindamycin he is aware he needs to schedule appointment with Dr. Bailey his nephrologist is been over 3 years.  Breathing somewhat better BNP is elevated consistent with CHF as well.  He will be needing see his cardiologist as well overall doing all right creatinine is up slightly at 2.71 or so.  We will get a BMP on return and follow-up in 1 week's time make sure he does get well overall is holding as an outpatient no other complaints DVT on Coumadin for same target range 2-3 known CRD stage IV we believe improved with pneumonia CHF currently fair but will need to see cardiologist as well.  Drug allergies include Keflex causing hives Cipro same Norvasc hives lisinopril hives Levoxyl undefined Tiazac undefined.  Patient's former smoker family is positive heart disease hypertension thyroid disease lung disease and kidney disease.  Review of Systems   All other systems reviewed and are negative.      Objective   Vitals:    12/13/19 0746   BP: 126/82   Pulse: 66   Temp: 97.6 °F (36.4 °C)   SpO2: 93%   Weight: 68.4 kg (150 lb 12.8 oz)     Physical Exam   Constitutional: He appears well-developed and well-nourished.   HENT:   Head: Normocephalic and atraumatic.   Eyes: Pupils are equal, round, and reactive to light. Conjunctivae are normal.   Cardiovascular: Normal rate, regular rhythm and normal heart sounds.   Pulmonary/Chest: Effort normal and breath sounds normal.   Decreased breath sounds but no rales or wheezes.   Abdominal: Soft. Bowel sounds are normal.   Neurological: He is alert.   Unremarkable gait and station   Skin: Skin is warm and dry.   Nursing note and vitals reviewed.      Lab Results   Component Value Date    INR 2.10  (A) 12/12/2019    INR 2.30 (A) 11/14/2019    INR 3.40 (A) 10/17/2019       Procedures    Assessment/Plan   1.  Pneumonitis improved plan continue clindamycin follow-up a week from today  Need a follow-up chest x-ray at the 1 month [facility] as well.  2.  CRD stage IV creatinine up slightly at 2.71 we will get a follow-up BMP 1 week's time also he will schedule appointment with his nephrologist Dr. Bailey    3.  CHF seemingly by history is doing well with his Bumex 2 mg a day being he is upset I do want him follow-up with cardiologist    4.  DVT    5.  Anticoagulation with Coumadin with patient's INR 2.3 today  To do same with recheck in 1 month's time    Much of this encounter note is an electronic transcription/translation of spoken language to printed text.  The electronic translation of spoken language may permit erroneous, or at times, nonsensical words or phrases to be inadvertently transcribed.  Although I have reviewed the note for such errors, some may still exist. If there are questions or for further clarification, please contact me.

## 2019-12-13 NOTE — TELEPHONE ENCOUNTER
PT NEEDS YOU TO RESEND HIS REFILL ON [facility]. HIS PHARM KEEPS SAYING THEY DON'T HAVE ANYTHING EVEN THO IS SAYS RECEIPT CONFIRMED BY PHARM.      SEND TO [employer] ON FILE

## 2019-12-17 LAB
BACTERIA SPEC AEROBE CULT: NORMAL
BACTERIA SPEC AEROBE CULT: NORMAL

## 2019-12-20 ENCOUNTER — OFFICE VISIT (OUTPATIENT)
Dept: FAMILY MEDICINE CLINIC | Facility: CLINIC | Age: 74
End: 2019-12-20

## 2019-12-20 VITALS
OXYGEN SATURATION: 94 % | SYSTOLIC BLOOD PRESSURE: 128 MMHG | TEMPERATURE: 97.6 F | BODY MASS INDEX: 20.26 KG/M2 | HEART RATE: 69 BPM | HEIGHT: 72 IN | WEIGHT: 149.6 LBS | DIASTOLIC BLOOD PRESSURE: 70 MMHG

## 2019-12-20 DIAGNOSIS — Z79.01 LONG TERM CURRENT USE OF ANTICOAGULANTS WITH INR GOAL OF 2.0-3.0: Primary | ICD-10-CM

## 2019-12-20 DIAGNOSIS — I10 HYPERTENSION, ESSENTIAL: ICD-10-CM

## 2019-12-20 DIAGNOSIS — I82.4Z9 DEEP VEIN THROMBOSIS (DVT) OF DISTAL VEIN OF LOWER EXTREMITY, UNSPECIFIED CHRONICITY, UNSPECIFIED LATERALITY (HCC): ICD-10-CM

## 2019-12-20 DIAGNOSIS — I50.9 CHRONIC CONGESTIVE HEART FAILURE, UNSPECIFIED HEART FAILURE TYPE (HCC): ICD-10-CM

## 2019-12-20 LAB — INR PPP: 2.1 (ref 0.9–1.1)

## 2019-12-20 PROCEDURE — 36416 COLLJ CAPILLARY BLOOD SPEC: CPT | Performed by: INTERNAL MEDICINE

## 2019-12-20 PROCEDURE — 85610 PROTHROMBIN TIME: CPT | Performed by: INTERNAL MEDICINE

## 2019-12-20 PROCEDURE — 99213 OFFICE O/P EST LOW 20 MIN: CPT | Performed by: INTERNAL MEDICINE

## 2019-12-20 NOTE — PROGRESS NOTES
Subjective   Narciso Romano is a 74 y.o. male.   Body mass index is 20.29 kg/m².  Patient with recent hospitalization at [facility] did have a hernia protruding that did have to be reduced also an elevated troponin really some fluid on lung negative cardiac work-up for that also.  Does have CRD stage IV now.  History of Present Illness recent hospitalization presenting to [facility] with bulging hernia which was reduced did see surgeon with possible elective surgery planned in future elevated troponin history of CHF stage IV renal disease evaluated he is due to see Dr. Bailey his nephrologist in the next few weeks.  Currently on Coumadin still for DVT at a 5/5/2.5 regimen recent hospitalization with  r shortness of air.  Now on O2 continuously at home but they would like him to at all time O2 sats on room air are 94% right now.  Not sure if this was CHF versus other.    5/5/2.5  Review of Systems   All other systems reviewed and are negative.      Objective   Vitals:    12/20/19 0754   BP: 128/70   Pulse: 69   Temp: 97.6 °F (36.4 °C)   SpO2: 94%   Weight: 67.9 kg (149 lb 9.6 oz)     Physical Exam   Constitutional: He appears well-developed and well-nourished.   HENT:   Head: Normocephalic and atraumatic.   Eyes: Pupils are equal, round, and reactive to light. Conjunctivae are normal.   Cardiovascular: Normal rate, regular rhythm and normal heart sounds.   Pulmonary/Chest: Effort normal and breath sounds normal.   Abdominal: Soft. Bowel sounds are normal.   Neurological: He is alert.   Unremarkable gait and station   Skin: Skin is warm and dry.   Nursing note and vitals reviewed.      Lab Results   Component Value Date    INR 2.10 (A) 12/20/2019    INR 2.30 (A) 12/13/2019    INR 2.10 (A) 12/12/2019       Procedures    Assessment/Plan   1.  DVT    2.  Anticoagulation with Coumadin target range 2-3 INR today is 2.1 plan follow-up in 3 weeks time    3.  Hypertension controlled continue present medicine    4.   CHF stable at this point time no rales heard in lungs no shortness of breath this point O2 sats are good tentative follow-up in 3 weeks time    Anticipate updated BMP for CRD stage IV BNP for CHF as well as anticoagulation for Coumadin recheck in 3 weeks time.    Much of this encounter note is an electronic transcription/translation of spoken language to printed text.  The electronic translation of spoken language may permit erroneous, or at times, nonsensical words or phrases to be inadvertently transcribed.  Although I have reviewed the note for such errors, some may still exist. If there are questions or for further clarification, please contact me.

## 2020-01-01 ENCOUNTER — TELEPHONE (OUTPATIENT)
Dept: FAMILY MEDICINE CLINIC | Facility: CLINIC | Age: 75
End: 2020-01-01

## 2020-01-01 ENCOUNTER — OFFICE VISIT (OUTPATIENT)
Dept: FAMILY MEDICINE CLINIC | Facility: CLINIC | Age: 75
End: 2020-01-01

## 2020-01-01 ENCOUNTER — LAB (OUTPATIENT)
Dept: FAMILY MEDICINE CLINIC | Facility: CLINIC | Age: 75
End: 2020-01-01

## 2020-01-01 VITALS
HEART RATE: 71 BPM | HEIGHT: 72 IN | BODY MASS INDEX: 20.42 KG/M2 | DIASTOLIC BLOOD PRESSURE: 60 MMHG | SYSTOLIC BLOOD PRESSURE: 160 MMHG | OXYGEN SATURATION: 92 % | WEIGHT: 150.8 LBS | TEMPERATURE: 96.9 F

## 2020-01-01 DIAGNOSIS — N18.4 CRD (CHRONIC RENAL DISEASE), STAGE IV (HCC): Primary | ICD-10-CM

## 2020-01-01 DIAGNOSIS — E83.52 HYPERCALCEMIA: Primary | ICD-10-CM

## 2020-01-01 DIAGNOSIS — I10 HYPERTENSION, ESSENTIAL: ICD-10-CM

## 2020-01-01 DIAGNOSIS — N18.4 STAGE 4 CHRONIC KIDNEY DISEASE (HCC): Primary | ICD-10-CM

## 2020-01-01 DIAGNOSIS — E83.52 SERUM CALCIUM ELEVATED: Primary | ICD-10-CM

## 2020-01-01 DIAGNOSIS — N18.30 STAGE 3 CHRONIC KIDNEY DISEASE, UNSPECIFIED WHETHER STAGE 3A OR 3B CKD (HCC): ICD-10-CM

## 2020-01-01 DIAGNOSIS — M1A.9XX0 CHRONIC GOUT WITHOUT TOPHUS, UNSPECIFIED CAUSE, UNSPECIFIED SITE: Primary | ICD-10-CM

## 2020-01-01 DIAGNOSIS — E83.52 SERUM CALCIUM ELEVATED: ICD-10-CM

## 2020-01-01 DIAGNOSIS — Z51.81 MEDICATION MONITORING ENCOUNTER: ICD-10-CM

## 2020-01-01 LAB
25(OH)D3 SERPL-MCNC: 18.3 NG/ML (ref 30–100)
ANION GAP SERPL CALCULATED.3IONS-SCNC: 8.5 MMOL/L (ref 5–15)
BUN SERPL-MCNC: 36 MG/DL (ref 8–23)
BUN/CREAT SERPL: 15 (ref 7–25)
CA-I BLD-MCNC: 7.6 MG/DL (ref 4.6–5.4)
CA-I SERPL ISE-MCNC: 1.91 MMOL/L (ref 1.15–1.35)
CALCIUM SPEC-SCNC: 13.8 MG/DL (ref 8.6–10.5)
CHLORIDE SERPL-SCNC: 104 MMOL/L (ref 98–107)
CO2 SERPL-SCNC: 25.5 MMOL/L (ref 22–29)
CREAT SERPL-MCNC: 2.4 MG/DL (ref 0.76–1.27)
DRUGS UR: NORMAL
ERYTHROCYTE [DISTWIDTH] IN BLOOD BY AUTOMATED COUNT: 18.3 % (ref 12.3–15.4)
GFR SERPL CREATININE-BSD FRML MDRD: 32 ML/MIN/1.73
GLUCOSE SERPL-MCNC: 75 MG/DL (ref 65–99)
HCT VFR BLD AUTO: 38 % (ref 37.5–51)
HGB BLD-MCNC: 11.9 G/DL (ref 13–17.7)
LYMPHOCYTES # BLD AUTO: 1.3 10*3/MM3 (ref 0.7–3.1)
LYMPHOCYTES NFR BLD AUTO: 17.6 % (ref 19.6–45.3)
MCH RBC QN AUTO: 28.3 PG (ref 26.6–33)
MCHC RBC AUTO-ENTMCNC: 31.3 G/DL (ref 31.5–35.7)
MCV RBC AUTO: 90.1 FL (ref 79–97)
MONOCYTES # BLD AUTO: 0.4 10*3/MM3 (ref 0.1–0.9)
MONOCYTES NFR BLD AUTO: 5.6 % (ref 5–12)
NEUTROPHILS NFR BLD AUTO: 5.7 10*3/MM3 (ref 1.7–7)
NEUTROPHILS NFR BLD AUTO: 76.8 % (ref 42.7–76)
PHOSPHATE SERPL-MCNC: 2.1 MG/DL (ref 2.5–4.5)
PLATELET # BLD AUTO: 300 10*3/MM3 (ref 140–450)
PMV BLD AUTO: 8.3 FL (ref 6–12)
POTASSIUM SERPL-SCNC: 5.1 MMOL/L (ref 3.5–5.2)
PTH-INTACT SERPL-MCNC: 401 PG/ML (ref 15–65)
RBC # BLD AUTO: 4.22 10*6/MM3 (ref 4.14–5.8)
SODIUM SERPL-SCNC: 138 MMOL/L (ref 136–145)
WBC # BLD AUTO: 7.4 10*3/MM3 (ref 3.4–10.8)

## 2020-01-01 PROCEDURE — 36415 COLL VENOUS BLD VENIPUNCTURE: CPT | Performed by: INTERNAL MEDICINE

## 2020-01-01 PROCEDURE — 80048 BASIC METABOLIC PNL TOTAL CA: CPT | Performed by: INTERNAL MEDICINE

## 2020-01-01 PROCEDURE — 84100 ASSAY OF PHOSPHORUS: CPT | Performed by: INTERNAL MEDICINE

## 2020-01-01 PROCEDURE — 82330 ASSAY OF CALCIUM: CPT | Performed by: INTERNAL MEDICINE

## 2020-01-01 PROCEDURE — 82306 VITAMIN D 25 HYDROXY: CPT | Performed by: INTERNAL MEDICINE

## 2020-01-01 PROCEDURE — 99214 OFFICE O/P EST MOD 30 MIN: CPT | Performed by: INTERNAL MEDICINE

## 2020-01-01 PROCEDURE — 83970 ASSAY OF PARATHORMONE: CPT | Performed by: INTERNAL MEDICINE

## 2020-01-01 PROCEDURE — 85025 COMPLETE CBC W/AUTO DIFF WBC: CPT | Performed by: INTERNAL MEDICINE

## 2020-01-01 RX ORDER — METOPROLOL SUCCINATE 25 MG/1
25 TABLET, EXTENDED RELEASE ORAL 2 TIMES DAILY
Qty: 60 TABLET | Refills: 0 | Status: SHIPPED | OUTPATIENT
Start: 2020-01-01 | End: 2021-01-01 | Stop reason: SDUPTHER

## 2020-01-01 RX ORDER — HYDROCODONE BITARTRATE AND ACETAMINOPHEN 5; 325 MG/1; MG/1
1 TABLET ORAL EVERY 6 HOURS PRN
Qty: 30 TABLET | Refills: 0 | Status: SHIPPED | OUTPATIENT
Start: 2020-01-01

## 2020-01-01 RX ORDER — INFLUENZA A VIRUS A/MICHIGAN/45/2015 X-275 (H1N1) ANTIGEN (FORMALDEHYDE INACTIVATED), INFLUENZA A VIRUS A/SINGAPORE/INFIMH-16-0019/2016 IVR-186 (H3N2) ANTIGEN (FORMALDEHYDE INACTIVATED), INFLUENZA B VIRUS B/PHUKET/3073/2013 ANTIGEN (FORMALDEHYDE INACTIVATED), AND INFLUENZA B VIRUS B/MARYLAND/15/2016 BX-69A ANTIGEN (FORMALDEHYDE INACTIVATED) 60; 60; 60; 60 UG/.7ML; UG/.7ML; UG/.7ML; UG/.7ML
INJECTION, SUSPENSION INTRAMUSCULAR
COMMUNITY
Start: 2020-10-08 | End: 2021-01-01

## 2020-01-01 RX ORDER — AZELASTINE 1 MG/ML
SPRAY, METERED NASAL
Qty: 1 EACH | Refills: 12 | Status: SHIPPED | OUTPATIENT
Start: 2020-01-01 | End: 2021-01-01

## 2020-01-01 RX ORDER — TADALAFIL 5 MG/1
5 TABLET ORAL DAILY PRN
Qty: 30 TABLET | Refills: 0 | Status: SHIPPED | OUTPATIENT
Start: 2020-01-01 | End: 2021-01-01

## 2020-01-02 ENCOUNTER — TELEPHONE (OUTPATIENT)
Dept: FAMILY MEDICINE CLINIC | Facility: CLINIC | Age: 75
End: 2020-01-02

## 2020-01-02 RX ORDER — COLCHICINE 0.6 MG/1
0.6 TABLET, FILM COATED ORAL DAILY
Qty: 30 TABLET | Refills: 0 | Status: SHIPPED | OUTPATIENT
Start: 2020-01-02 | End: 2020-02-17

## 2020-01-02 NOTE — TELEPHONE ENCOUNTER
Pt states [employer] will not pay for medication in generic form.  Needs the name brand sent to [facility] [phone] please.    Message left with ans svc

## 2020-01-10 ENCOUNTER — OFFICE VISIT (OUTPATIENT)
Dept: FAMILY MEDICINE CLINIC | Facility: CLINIC | Age: 75
End: 2020-01-10

## 2020-01-10 VITALS
DIASTOLIC BLOOD PRESSURE: 76 MMHG | WEIGHT: 160.6 LBS | HEIGHT: 72 IN | TEMPERATURE: 97.9 F | BODY MASS INDEX: 21.75 KG/M2 | SYSTOLIC BLOOD PRESSURE: 138 MMHG | HEART RATE: 74 BPM | OXYGEN SATURATION: 94 %

## 2020-01-10 DIAGNOSIS — I82.4Z9 DEEP VEIN THROMBOSIS (DVT) OF DISTAL VEIN OF LOWER EXTREMITY, UNSPECIFIED CHRONICITY, UNSPECIFIED LATERALITY (HCC): ICD-10-CM

## 2020-01-10 DIAGNOSIS — J01.10 ACUTE FRONTAL SINUSITIS, RECURRENCE NOT SPECIFIED: ICD-10-CM

## 2020-01-10 DIAGNOSIS — Z79.01 LONG TERM CURRENT USE OF ANTICOAGULANTS WITH INR GOAL OF 2.0-3.0: Primary | ICD-10-CM

## 2020-01-10 PROCEDURE — 99213 OFFICE O/P EST LOW 20 MIN: CPT | Performed by: INTERNAL MEDICINE

## 2020-01-10 RX ORDER — DOXYCYCLINE HYCLATE 100 MG/1
100 CAPSULE ORAL 2 TIMES DAILY
Qty: 20 CAPSULE | Refills: 0 | Status: SHIPPED | OUTPATIENT
Start: 2020-01-10 | End: 2020-01-20

## 2020-01-10 NOTE — PROGRESS NOTES
"Subjective   Narciso Romano is a 74 y.o. male.  Patient has DVT on long-term Coumadin for same also sinus trouble some cough wheeze given clindamycin which did not help his sinuses looking for something else to help  Body mass index is 21.78 kg/m².  History of Present Illness   DVT\" with Coumadin target range 2-3 patient is currently on a 5/5/2.5 regimen INR comes back slightly low at 1.7 not aware of any dietary indiscretions    Patient changed to a 5/5/5/2 . 5 cycle is a 4-day cycle  Minimal cough no significant sputum is having draining sinus drainage and pressure slightly more on frontal than in the maxillary sinuses no reported temperature with this.    Review of Systems   Constitutional: Positive for appetite change.   HENT: Positive for congestion and postnasal drip.    Eyes: Negative.    Respiratory: Negative.    Cardiovascular: Negative.    Gastrointestinal: Negative.    Endocrine: Positive for cold intolerance.   Genitourinary: Negative.    Musculoskeletal: Positive for joint swelling.   Skin: Negative.    Allergic/Immunologic: Positive for environmental allergies and immunocompromised state.   Neurological: Positive for weakness.   Hematological: Negative.    Psychiatric/Behavioral: Negative.        Objective   Vitals:    01/10/20 0752   BP: 138/76   Pulse: 74   Temp: 97.9 °F (36.6 °C)   SpO2: 94%   Weight: 72.8 kg (160 lb 9.6 oz)     Physical Exam   Constitutional: He appears well-developed and well-nourished.   HENT:   Head: Normocephalic and atraumatic.   Right Ear: External ear normal.   Left Ear: External ear normal.   Mouth/Throat: Oropharynx is clear and moist.   Slight more pressure over frontal sinuses the maxillary   Eyes: Pupils are equal, round, and reactive to light. Conjunctivae are normal.   Cardiovascular: Normal rate, regular rhythm and normal heart sounds.   Pulmonary/Chest: Effort normal.   Slightly decreased breath sounds but no rales or wheezes   Neurological: He is alert. "   Unremarkable gait and station   Nursing note and vitals reviewed.      Lab Results   Component Value Date    INR 2.10 (A) 12/20/2019    INR 2.30 (A) 12/13/2019    INR 2.10 (A) 12/12/2019       Procedures    Assessment/Plan   1.  DVT    2.  Anticoagulation with Coumadin target range 2-3 plan new dose of 5/5/5/2.5 as a 4-day cycle follow-up INR in 6 days time    3.  Sinusitis frontal and maxillary doxycycline 1 mg twice daily for 10 days time follow-up if not well in 10 days time.    Much of this encounter note is an electronic transcription/translation of spoken language to printed text.  The electronic translation of spoken language may permit erroneous, or at times, nonsensical words or phrases to be inadvertently transcribed.  Although I have reviewed the note for such errors, some may still exist. If there are questions or for further clarification, please contact me.      5/5/5/2.5

## 2020-01-28 ENCOUNTER — TELEPHONE (OUTPATIENT)
Dept: FAMILY MEDICINE CLINIC | Facility: CLINIC | Age: 75
End: 2020-01-28

## 2020-01-28 NOTE — TELEPHONE ENCOUNTER
Check with patient to make sure there are no recent biopsies or other reasons he cannot be on dosed up to an INR of 2-3, such as recent biopsy not 1 have patient take Coumadin 10 mg today with repeat INR tomorrow make sure that today's value of 1.1 is from today

## 2020-01-28 NOTE — TELEPHONE ENCOUNTER
INR 1.1 TAKING 5MG COUMADIN  FOR 2 DAYS THEN 2.5. HE WAS OFF IT FOR ABOUT WEEK WHILE IN THE HOSPITAL.

## 2020-01-29 ENCOUNTER — TELEPHONE (OUTPATIENT)
Dept: FAMILY MEDICINE CLINIC | Facility: CLINIC | Age: 75
End: 2020-01-29

## 2020-01-29 DIAGNOSIS — I82.4Z9 DEEP VEIN THROMBOSIS (DVT) OF DISTAL VEIN OF LOWER EXTREMITY, UNSPECIFIED CHRONICITY, UNSPECIFIED LATERALITY (HCC): ICD-10-CM

## 2020-01-29 NOTE — TELEPHONE ENCOUNTER
I need more information.  What was last pro time.  How long has been on this milligrams of Coumadin etc.

## 2020-01-29 NOTE — TELEPHONE ENCOUNTER
Patient was admitted to the hospital on Jan 17th and discharged on Jan 25th for abd swelling.  He was not given any Warfarin during that time .  Just started yesterday on Warfarin again and was told to take 10mg per Dr. oviedo through  [facility].  Records of prior INR in chart and dose.  He does not know why he was not given this in the hospital at [facility]......

## 2020-01-31 ENCOUNTER — TELEPHONE (OUTPATIENT)
Dept: FAMILY MEDICINE CLINIC | Facility: CLINIC | Age: 75
End: 2020-01-31

## 2020-01-31 NOTE — TELEPHONE ENCOUNTER
If still on Coumadin 5/5/5/5/2.5 cycle and still on Coumadin.  Not another blood thinner since we last saw him he will resume his 5/5/5/5/2.5 dosage now with repeat INR Monday he should shoot up into the two-point range with his 2 large doses he is already had.

## 2020-02-03 ENCOUNTER — TELEPHONE (OUTPATIENT)
Dept: FAMILY MEDICINE CLINIC | Facility: CLINIC | Age: 75
End: 2020-02-03

## 2020-02-07 ENCOUNTER — TELEPHONE (OUTPATIENT)
Dept: FAMILY MEDICINE CLINIC | Facility: CLINIC | Age: 75
End: 2020-02-07

## 2020-02-07 ENCOUNTER — ANTICOAGULATION VISIT (OUTPATIENT)
Dept: FAMILY MEDICINE CLINIC | Facility: CLINIC | Age: 75
End: 2020-02-07

## 2020-02-07 DIAGNOSIS — I82.4Z9 DEEP VEIN THROMBOSIS (DVT) OF DISTAL VEIN OF LOWER EXTREMITY, UNSPECIFIED CHRONICITY, UNSPECIFIED LATERALITY (HCC): ICD-10-CM

## 2020-02-07 LAB — INR PPP: 2.8

## 2020-02-11 ENCOUNTER — ANTICOAGULATION VISIT (OUTPATIENT)
Dept: FAMILY MEDICINE CLINIC | Facility: CLINIC | Age: 75
End: 2020-02-11

## 2020-02-11 ENCOUNTER — TELEPHONE (OUTPATIENT)
Dept: FAMILY MEDICINE CLINIC | Facility: CLINIC | Age: 75
End: 2020-02-11

## 2020-02-11 LAB — INR PPP: 4

## 2020-02-11 NOTE — TELEPHONE ENCOUNTER
Per Dr. Black it should be hold today, then 2.5 mg for two days, recheck on Friday. Pt and Aaliyah with [facility] was informed.

## 2020-02-14 ENCOUNTER — TELEPHONE (OUTPATIENT)
Dept: FAMILY MEDICINE CLINIC | Facility: CLINIC | Age: 75
End: 2020-02-14

## 2020-02-17 ENCOUNTER — TELEPHONE (OUTPATIENT)
Dept: FAMILY MEDICINE CLINIC | Facility: CLINIC | Age: 75
End: 2020-02-17

## 2020-02-17 RX ORDER — COLCHICINE 0.6 MG/1
TABLET, FILM COATED ORAL
Qty: 30 TABLET | Refills: 0 | Status: SHIPPED | OUTPATIENT
Start: 2020-02-17 | End: 2020-03-16

## 2020-02-17 NOTE — TELEPHONE ENCOUNTER
Aaliyah informed 5/5/2.5. Patient also has edema legs advised appt in office.[facility]

## 2020-02-20 ENCOUNTER — TELEPHONE (OUTPATIENT)
Dept: FAMILY MEDICINE CLINIC | Facility: CLINIC | Age: 75
End: 2020-02-20

## 2020-02-20 NOTE — TELEPHONE ENCOUNTER
5 mg today INR tomorrow if he can come into office.  If not to a 5/5/2 0.5 cycle with INR on Monday.

## 2020-02-20 NOTE — TELEPHONE ENCOUNTER
INR 1.4      MON AND TUES  5MG     WED   2.5MG       Pt being d/c from home health, will need to fu in office

## 2020-02-24 ENCOUNTER — OFFICE VISIT (OUTPATIENT)
Dept: FAMILY MEDICINE CLINIC | Facility: CLINIC | Age: 75
End: 2020-02-24

## 2020-02-24 VITALS
WEIGHT: 151.4 LBS | HEART RATE: 68 BPM | OXYGEN SATURATION: 95 % | BODY MASS INDEX: 20.51 KG/M2 | DIASTOLIC BLOOD PRESSURE: 70 MMHG | TEMPERATURE: 97.5 F | SYSTOLIC BLOOD PRESSURE: 140 MMHG | HEIGHT: 72 IN | RESPIRATION RATE: 20 BRPM

## 2020-02-24 DIAGNOSIS — Z79.01 LONG TERM CURRENT USE OF ANTICOAGULANTS WITH INR GOAL OF 2.0-3.0: ICD-10-CM

## 2020-02-24 DIAGNOSIS — J01.00 ACUTE MAXILLARY SINUSITIS, RECURRENCE NOT SPECIFIED: ICD-10-CM

## 2020-02-24 DIAGNOSIS — I82.5Y2 CHRONIC DEEP VEIN THROMBOSIS (DVT) OF PROXIMAL VEIN OF LEFT LOWER EXTREMITY (HCC): Primary | ICD-10-CM

## 2020-02-24 LAB — INR PPP: 1.6 (ref 0.9–1.1)

## 2020-02-24 PROCEDURE — 85610 PROTHROMBIN TIME: CPT | Performed by: INTERNAL MEDICINE

## 2020-02-24 PROCEDURE — 36416 COLLJ CAPILLARY BLOOD SPEC: CPT | Performed by: INTERNAL MEDICINE

## 2020-02-24 PROCEDURE — 99213 OFFICE O/P EST LOW 20 MIN: CPT | Performed by: INTERNAL MEDICINE

## 2020-02-24 RX ORDER — AMOXICILLIN 875 MG/1
875 TABLET, COATED ORAL 2 TIMES DAILY
Qty: 20 TABLET | Refills: 0 | Status: SHIPPED | OUTPATIENT
Start: 2020-02-24 | End: 2020-05-12

## 2020-02-24 NOTE — PROGRESS NOTES
Subjective   Narciso Romano is a 74 y.o. male.  Patient recently status post hospitalization.  Did have umbilical hernia reduction also has chest pain  Body mass index is 20.53 kg/m².  Seen in hospital predominately at [facility] probably [facility].  History of Present Illness   No longer has visiting nurse in a.m. and is on long-term Coumadin INR is Ancelmo been 1.6 he is taking 5 mg daily to varying degree.  INR slightly low at 1.6 his target range is 2-3 is on this for atrial fibrillation lab patient take 7.5 mg today then go final grams daily thereafter follow-up INR on return on Thursday.  Breathing is satisfactory known CHF no chest pain complaints voiced.  Blood pressure satisfactory increased sinus drainage and pressure mainly blood pressure maxillary sinuses with previous acute UTI which he is has recurrently.    Review of Systems   All other systems reviewed and are negative.      Objective   Vitals:    02/24/20 0835   BP: 140/70   Pulse: 68   Resp: 20   Temp: 97.5 °F (36.4 °C)   SpO2: 95%   Weight: 68.7 kg (151 lb 6.4 oz)     Physical Exam   Constitutional: He appears well-developed and well-nourished.   HENT:   Head: Normocephalic and atraumatic.   Eyes: Pupils are equal, round, and reactive to light. Conjunctivae are normal.   Cardiovascular: Normal rate, regular rhythm and normal heart sounds.   Pulmonary/Chest: Effort normal and breath sounds normal.   No rales or wheezes noted.  Seems to be compensated for CHF by history   Abdominal: Soft. Bowel sounds are normal.   Nursing note and vitals reviewed.      Lab Results   Component Value Date    INR 1.60 (A) 02/24/2020    INR 4.00 02/11/2020    INR 2.80 02/07/2020       Procedures    Assessment/Plan  1.  DVT    2.  Anticoagulation with Coumadin target range 2-3 plan 3.  Acute maxillary sinusitis      Coumadin 7.5 mg today then 5 mg on Tuesday Wednesday with follow-up on Thursday    Acute maxillary sinusitis  Patient can take amoxicillin plan  amoxicillin 875 twice daily for 10 days time call follow-up if not well in 10 days time dissipate get an updated BMP for renal status status post recent hospitalization with CHF among other issues.  On return Thursday     Much of this encounter note is an electronic transcription/translation of spoken language to printed text.  The electronic translation of spoken language may permit erroneous, or at times, nonsensical words or phrases to be inadvertently transcribed.  Although I have reviewed the note for such errors, some may still exist. If there are questions or for further clarification, please contact me.

## 2020-02-27 ENCOUNTER — OFFICE VISIT (OUTPATIENT)
Dept: FAMILY MEDICINE CLINIC | Facility: CLINIC | Age: 75
End: 2020-02-27

## 2020-02-27 VITALS
DIASTOLIC BLOOD PRESSURE: 76 MMHG | HEIGHT: 72 IN | BODY MASS INDEX: 20.72 KG/M2 | HEART RATE: 82 BPM | OXYGEN SATURATION: 95 % | TEMPERATURE: 97.7 F | SYSTOLIC BLOOD PRESSURE: 124 MMHG | WEIGHT: 153 LBS

## 2020-02-27 DIAGNOSIS — I82.5Y2 CHRONIC DEEP VEIN THROMBOSIS (DVT) OF PROXIMAL VEIN OF LEFT LOWER EXTREMITY (HCC): Primary | ICD-10-CM

## 2020-02-27 DIAGNOSIS — Z79.01 LONG TERM CURRENT USE OF ANTICOAGULANTS WITH INR GOAL OF 2.0-3.0: ICD-10-CM

## 2020-02-27 DIAGNOSIS — I10 HYPERTENSION, ESSENTIAL: ICD-10-CM

## 2020-02-27 LAB — INR PPP: 1.9 (ref 0.9–1.1)

## 2020-02-27 PROCEDURE — 85610 PROTHROMBIN TIME: CPT | Performed by: INTERNAL MEDICINE

## 2020-02-27 PROCEDURE — 99213 OFFICE O/P EST LOW 20 MIN: CPT | Performed by: INTERNAL MEDICINE

## 2020-02-27 PROCEDURE — 36416 COLLJ CAPILLARY BLOOD SPEC: CPT | Performed by: INTERNAL MEDICINE

## 2020-02-27 NOTE — PROGRESS NOTES
Subjective   Narciso Romano is a 74 y.o. male.   Body mass index is 20.75 kg/m².  Patient with DVT minor adjustments to his Coumadin dose historically he is been taken 5/5/5/2.5 with his hospitalization Coumadin did seem to get off a little bit.  Doing fairly well breathing stamina problem  History of Present Illness   Follow-up with DVT long-term integration with Coumadin target range ~2-3 does have recent heart failure and other issues going on breathing is satisfactory this point time fluid status seems to be in balance by history sure satisfactory as well.    Review of Systems    Objective   Vitals:    02/27/20 0811   BP: 124/76   Pulse: 82   Temp: 97.7 °F (36.5 °C)   SpO2: 95%   Weight: 69.4 kg (153 lb)     Physical Exam   Constitutional: He appears well-developed and well-nourished.   HENT:   Head: Normocephalic and atraumatic.   Eyes: Pupils are equal, round, and reactive to light. Conjunctivae are normal.   Cardiovascular: Normal rate, regular rhythm and normal heart sounds.   Pulmonary/Chest: Effort normal and breath sounds normal.   No rales or wheezes at this point.   Neurological: He is alert.   Somewhat slow but stable gait and station   Skin: Skin is warm and dry.   Nursing note and vitals reviewed.      Lab Results   Component Value Date    INR 1.90 (A) 02/27/2020    INR 1.60 (A) 02/24/2020    INR 4.00 02/11/2020       Procedures    Assessment/Plan     1.  DVT    2.  Anticoagulation with Coumadin target range 2-3 plan resume Coumadin 5/5/5/2.5 with INR on Tuesday    3.  Hypertension well-controlled at this point  Patient with recent problems with fluid CHF seems to be in balance at this point    Much of this encounter note is an electronic transcription/translation of spoken language to printed text.  The electronic translation of spoken language may permit erroneous, or at times, nonsensical words or phrases to be inadvertently transcribed.  Although I have reviewed the note for such errors, some  may still exist. If there are questions or for further clarification, please contact me.

## 2020-03-03 ENCOUNTER — OFFICE VISIT (OUTPATIENT)
Dept: FAMILY MEDICINE CLINIC | Facility: CLINIC | Age: 75
End: 2020-03-03

## 2020-03-03 VITALS
OXYGEN SATURATION: 94 % | HEIGHT: 72 IN | TEMPERATURE: 98.1 F | BODY MASS INDEX: 21.16 KG/M2 | HEART RATE: 88 BPM | SYSTOLIC BLOOD PRESSURE: 138 MMHG | DIASTOLIC BLOOD PRESSURE: 88 MMHG | WEIGHT: 156.2 LBS

## 2020-03-03 DIAGNOSIS — N18.30 CRD (CHRONIC RENAL DISEASE), STAGE III (HCC): Primary | ICD-10-CM

## 2020-03-03 DIAGNOSIS — I82.5Y2 CHRONIC DEEP VEIN THROMBOSIS (DVT) OF PROXIMAL VEIN OF LEFT LOWER EXTREMITY (HCC): ICD-10-CM

## 2020-03-03 LAB
ANION GAP SERPL CALCULATED.3IONS-SCNC: 15.3 MMOL/L (ref 5–15)
BUN BLD-MCNC: 25 MG/DL (ref 8–23)
BUN/CREAT SERPL: 12 (ref 7–25)
CALCIUM SPEC-SCNC: 10 MG/DL (ref 8.6–10.5)
CHLORIDE SERPL-SCNC: 103 MMOL/L (ref 98–107)
CO2 SERPL-SCNC: 22.7 MMOL/L (ref 22–29)
CREAT BLD-MCNC: 2.08 MG/DL (ref 0.76–1.27)
GFR SERPL CREATININE-BSD FRML MDRD: 38 ML/MIN/1.73
GLUCOSE BLD-MCNC: 74 MG/DL (ref 65–99)
INR PPP: 2.2 (ref 0.9–1.1)
POTASSIUM BLD-SCNC: 4.9 MMOL/L (ref 3.5–5.2)
SODIUM BLD-SCNC: 141 MMOL/L (ref 136–145)

## 2020-03-03 PROCEDURE — 36415 COLL VENOUS BLD VENIPUNCTURE: CPT | Performed by: INTERNAL MEDICINE

## 2020-03-03 PROCEDURE — 96160 PT-FOCUSED HLTH RISK ASSMT: CPT | Performed by: INTERNAL MEDICINE

## 2020-03-03 PROCEDURE — G0439 PPPS, SUBSEQ VISIT: HCPCS | Performed by: INTERNAL MEDICINE

## 2020-03-03 PROCEDURE — 85610 PROTHROMBIN TIME: CPT | Performed by: INTERNAL MEDICINE

## 2020-03-03 PROCEDURE — 36416 COLLJ CAPILLARY BLOOD SPEC: CPT | Performed by: INTERNAL MEDICINE

## 2020-03-03 PROCEDURE — 80048 BASIC METABOLIC PNL TOTAL CA: CPT | Performed by: INTERNAL MEDICINE

## 2020-03-03 PROCEDURE — 99213 OFFICE O/P EST LOW 20 MIN: CPT | Performed by: INTERNAL MEDICINE

## 2020-03-03 NOTE — PROGRESS NOTES
The ABCs of the Annual Wellness Visit  Subsequent Medicare Wellness Visit    Chief Complaint   Patient presents with   • Anticoagulation   • Medicare Wellness-subsequent       Subjective   History of Present Illness:  Narciso Romano is a 74 y.o. male who presents for a Subsequent Medicare Wellness Visit.    HEALTH RISK ASSESSMENT    Recent Hospitalizations:  Recently treated at the following:  Other: [facility]    Current Medical Providers:  Patient Care Team:  Jacinto Black Jr., MD as PCP - General  Jacinto Black Jr., MD as PCP - Family Medicine    Smoking Status:  Social History     Tobacco Use   Smoking Status Former Smoker   • Packs/day: 1.00   • Years: 30.00   • Pack years: 30.00   Smokeless Tobacco Never Used       Alcohol Consumption:  Social History     Substance and Sexual Activity   Alcohol Use No       Depression Screen:   PHQ-2/PHQ-9 Depression Screening 3/3/2020   Little interest or pleasure in doing things 0   Feeling down, depressed, or hopeless 0   Trouble falling or staying asleep, or sleeping too much 0   Feeling tired or having little energy 0   Poor appetite or overeating 0   Feeling bad about yourself - or that you are a failure or have let yourself or your family down 0   Trouble concentrating on things, such as reading the newspaper or watching television 0   Moving or speaking so slowly that other people could have noticed. Or the opposite - being so fidgety or restless that you have been moving around a lot more than usual 0   Thoughts that you would be better off dead, or of hurting yourself in some way 0   Total Score 0   If you checked off any problems, how difficult have these problems made it for you to do your work, take care of things at home, or get along with other people? Not difficult at all       Fall Risk Screen:  STEADI Fall Risk Assessment has not been completed.    Health Habits and Functional and Cognitive Screening:  Functional & Cognitive Status 3/3/2020   Do you  have difficulty preparing food and eating? No   Do you have difficulty bathing yourself, getting dressed or grooming yourself? No   Do you have difficulty using the toilet? No   Do you have difficulty moving around from place to place? No   Do you have trouble with steps or getting out of a bed or a chair? No   Current Diet Well Balanced Diet   Dental Exam Up to date   Eye Exam Up to date   Exercise (times per week) 7 times per week   Current Exercise Activities Include Walking   Do you need help using the phone?  No   Are you deaf or do you have serious difficulty hearing?  No   Do you need help with transportation? No   Do you need help shopping? No   Do you need help preparing meals?  No   Do you need help with housework?  No   Do you need help with laundry? No   Do you need help taking your medications? No   Do you need help managing money? No   Do you ever drive or ride in a car without wearing a seat belt? No   Have you felt unusual stress, anger or loneliness in the last month? No   Who do you live with? Spouse   If you need help, do you have trouble finding someone available to you? No   Have you been bothered in the last four weeks by sexual problems? No   Do you have difficulty concentrating, remembering or making decisions? No         Does the patient have evidence of cognitive impairment? No    Asprin use counseling:Taking ASA appropriately as indicated    Age-appropriate Screening Schedule:  Refer to the list below for future screening recommendations based on patient's age, sex and/or medical conditions. Orders for these recommended tests are listed in the plan section. The patient has been provided with a written plan.    Health Maintenance   Topic Date Due   • TDAP/TD VACCINES (1 - Tdap) 11/22/1957   • ZOSTER VACCINE (1 of 2) 11/22/1996   • LIPID PANEL  01/08/2020   • COLONOSCOPY  05/07/2020 (Originally 1/1/2020)   • PNEUMOCOCCAL VACCINE (65+ HIGH RISK)  Completed   • INFLUENZA VACCINE  Completed           The following portions of the patient's history were reviewed and updated as appropriate: allergies, current medications, past family history, past medical history, past social history, past surgical history and problem list.    Outpatient Medications Prior to Visit   Medication Sig Dispense Refill   • acyclovir (ZOVIRAX) 800 MG tablet Take 1 tablet by mouth 5 (Five) Times a Day. 35 tablet 2   • albuterol (PROVENTIL HFA;VENTOLIN HFA) 108 (90 BASE) MCG/ACT inhaler Inhale 2 puffs every 4 (four) hours as needed for wheezing.     • amoxicillin (AMOXIL) 875 MG tablet Take 1 tablet by mouth 2 (Two) Times a Day. 20 tablet 0   • aspirin 81 MG EC tablet Take 81 mg by mouth daily.     • atorvastatin (LIPITOR) 20 MG tablet Take 1 tablet by mouth Daily. For cholesterol 30 tablet 1   • azelastine (ASTELIN) 0.1 % nasal spray 2 sprays into the nostril(s) as directed by provider 2 (Two) Times a Day. Use in each nostril as directed 1 each 12   • bumetanide (BUMEX) 2 MG tablet TAKE 1 TAB BY MOUTH 2 TIMES A DAY FOR 30 DAYS  11   • carvedilol (COREG) 12.5 MG tablet TAKE 1 TAB BY MOUTH 2 TIMES A DAY FOR 30 DAYS  11   • COLCRYS 0.6 MG tablet TAKE 1 TABLET BY MOUTH EVERY DAY 30 tablet 0   • finasteride (PROSCAR) 5 MG tablet Take 5 mg by mouth daily.     • Fluticasone Furoate-Vilanterol (BREO ELLIPTA) 100-25 MCG/INH aerosol powder  Inhale 1 puff Daily. 1 each 0   • hydrALAZINE (APRESOLINE) 100 MG tablet Take 1 tablet by mouth 3 (Three) Times a Day. 90 tablet 10   • HYDROcodone-acetaminophen (NORCO) 7.5-325 MG per tablet TAKE 1 TABLET BY MOUTH EVERY 6 HOURS AS NEEDED FOR 3 DYAS  0   • isosorbide dinitrate (ISORDIL) 20 MG tablet TAKE 1 TABLET THREE TIMES A DAY 90 tablet 0   • levothyroxine sodium (TIROSINT) 25 MCG capsule Take 1 capsule by mouth Daily. Take this amt only!!!! 30 capsule 2   • metoprolol tartrate (LOPRESSOR) 50 MG tablet Take 1 tablet by mouth 2 (Two) Times a Day. 180 tablet 2   • montelukast (SINGULAIR) 10 MG tablet  Take 1 tablet by mouth Every Night. 30 tablet 5   • NIFEdipine CC (ADALAT CC) 90 MG 24 hr tablet Take 1 tablet by mouth Daily. 90 tablet 3   • nitroglycerin (NITROSTAT) 0.4 MG SL tablet Place 1 tablet under the tongue Every 5 (Five) Minutes As Needed for Chest Pain. Take no more than 3 doses in 15 minutes. 25 tablet 3   • pantoprazole (PROTONIX) 40 MG EC tablet Take 40 mg by mouth daily.     • sildenafil (REVATIO) 20 MG tablet Take 1 tablet by mouth Daily. 10 tablet 3   • tamsulosin (FLOMAX) 0.4 MG capsule 24 hr capsule TAKE ONE CAPSULE EVERY DAY 90 capsule 3   • warfarin (COUMADIN) 5 MG tablet TAKE 2 TABLETS EVERY DAY 60 tablet 4     No facility-administered medications prior to visit.        Patient Active Problem List   Diagnosis   • DVT (deep venous thrombosis) (CMS/HCC)   • Long term current use of anticoagulants with INR goal of 2.0-3.0   • Essential hypertension   • Postoperative hypothyroidism   • BPH (benign prostatic hyperplasia)   • Hyperlipidemia   • GERD (gastroesophageal reflux disease)   • COPD (chronic obstructive pulmonary disease) (CMS/HCC)   • Renal disease   • Myocardial infarction (CMS/HCC)   • History of DVT of lower extremity   • History of colon polyps   • Warfarin anticoagulation   • Change in bowel habits       Advanced Care Planning:  ACP discussion was held with the patient during this visit. Patient does not have an advance directive, information provided.    Review of Systems    Compared to one year ago, the patient feels his physical health is the same.  Compared to one year ago, the patient feels his mental health is the same.    Reviewed chart for potential of high risk medication in the elderly: yes  Reviewed chart for potential of harmful drug interactions in the elderly:yes    Objective         Vitals:    03/03/20 0749   BP: 138/88   BP Location: Left arm   Patient Position: Sitting   Cuff Size: Adult   Pulse: 88   Temp: 98.1 °F (36.7 °C)   TempSrc: Oral   SpO2: 94%   Weight: 70.9  "kg (156 lb 3.2 oz)   Height: 182.9 cm (72\")   PainSc: 0-No pain       Body mass index is 21.18 kg/m².  Discussed the patient's BMI with him. The BMI is in the acceptable range.    Physical Exam          Assessment/Plan   Medicare Risks and Personalized Health Plan  CMS Preventative Services Quick Reference  Immunizations Discussed/Encouraged (specific immunizations; Shingrix )    The above risks/problems have been discussed with the patient.  Pertinent information has been shared with the patient in the After Visit Summary.  Follow up plans and orders are seen below in the Assessment/Plan Section.    Diagnoses and all orders for this visit:    1. Chronic deep vein thrombosis (DVT) of proximal vein of left lower extremity (CMS/[facility])  -     POC INR      Follow Up:  No follow-ups on file.     An After Visit Summary and PPPS were given to the patient.             "

## 2020-03-03 NOTE — PATIENT INSTRUCTIONS
Medicare Wellness  Personal Prevention Plan of Service     Date of Office Visit:  2020  Encounter Provider:  Jacinto Black MD  Place of Service:  [facility]  Patient Name: Narciso Romano  :  1945    As part of the Medicare Wellness portion of your visit today, we are providing you with this personalized preventive plan of services (PPPS). This plan is based upon recommendations of the [place] Preventive Services Task Force (USPSTF) and the Advisory Committee on Immunization Practices (ACIP).    This lists the preventive care services that should be considered, and provides dates of when you are due. Items listed as completed are up-to-date and do not require any further intervention.    Health Maintenance   Topic Date Due   • TDAP/TD VACCINES (1 - Tdap) 1957   • ZOSTER VACCINE (1 of 2) 1996   • HEPATITIS C SCREENING  2016   • COLONOSCOPY  2020   • LIPID PANEL  2020   • MEDICARE ANNUAL WELLNESS  2020   • PNEUMOCOCCAL VACCINE (65+ HIGH RISK)  Completed   • INFLUENZA VACCINE  Completed   • AAA SCREEN (ONE-TIME)  Completed       Orders Placed This Encounter   Procedures   • POC INR     This is an external result entered through the Results Console.       No follow-ups on file.

## 2020-03-03 NOTE — PROGRESS NOTES
Subjective   Narciso Romano is a 74 y.o. male.  Patient here for Medicare wellness visit subsequent does need a shingles vaccine otherwise seems to be doing fairly well.  Needs INR check for DVT as well  Body mass index is 21.18 kg/m².  History of Present Illness   Medicare wellness visit subsequent does want a shingles vaccine which we did discuss discussed seems to be current with other screening modalities recent hospitalization where there was worsening of his renal function from stage III to stage IV we will get updated BMP also here for follow-up on his DVT/Coumadin anticoagulation for same patient hold his Coumadin during hospital stay thinks he might have to something procedurally with him he is now back on a 5/5/5/2.5 regimen with today's INR satisfactory at 2.2 we will recheck him in 2 weeks time regarding that blood pressure satisfactory today as well.    Review of Systems   All other systems reviewed and are negative.      Objective   Vitals:    03/03/20 0749   BP: 138/88   Pulse: 88   Temp: 98.1 °F (36.7 °C)   SpO2: 94%   Weight: 70.9 kg (156 lb 3.2 oz)     Physical Exam   Constitutional: He appears well-developed and well-nourished.   HENT:   Head: Normocephalic and atraumatic.   Eyes: Pupils are equal, round, and reactive to light. Conjunctivae are normal.   Cardiovascular: Normal rate, regular rhythm and normal heart sounds.   Pulmonary/Chest: Effort normal.   Slight decreased breath sounds but no rales or wheezes noted.   Neurological: He is alert.   Unremarkable gait and station   Skin: Skin is warm.   Nursing note and vitals reviewed.      Lab Results   Component Value Date    INR 2.20 (A) 03/03/2020    INR 1.90 (A) 02/27/2020    INR 1.60 (A) 02/24/2020       Procedures    Assessment/Plan   1.  Medicare wellness visit subsequent    2.  DVT    3.  Anticoagulation with Coumadin target range 2-3 plan continue Coumadin at 5/5/5/2.5 with INR in 2 weeks time.    4.  CRD stage III now possibly stage IV  get a updated BMP he does see a nephrologist in a couple weeks sees Dr. Gibson daily.  Blood pressure satisfactory today no adjustments required on that    Much of this encounter note is an electronic transcription/translation of spoken language to printed text.  The electronic translation of spoken language may permit erroneous, or at times, nonsensical words or phrases to be inadvertently transcribed.  Although I have reviewed the note for such errors, some may still exist. If there are questions or for further clarification, please contact me.    crd 3-4?  5/5/5/2.5

## 2020-03-13 ENCOUNTER — TELEPHONE (OUTPATIENT)
Dept: FAMILY MEDICINE CLINIC | Facility: CLINIC | Age: 75
End: 2020-03-13

## 2020-03-13 DIAGNOSIS — I82.5Y2 CHRONIC DEEP VEIN THROMBOSIS (DVT) OF PROXIMAL VEIN OF LEFT LOWER EXTREMITY (HCC): Primary | ICD-10-CM

## 2020-03-16 RX ORDER — COLCHICINE 0.6 MG/1
TABLET, FILM COATED ORAL
Qty: 30 TABLET | Refills: 0 | Status: SHIPPED | OUTPATIENT
Start: 2020-03-16 | End: 2021-01-01 | Stop reason: ALTCHOICE

## 2020-03-17 ENCOUNTER — OFFICE VISIT (OUTPATIENT)
Dept: FAMILY MEDICINE CLINIC | Facility: CLINIC | Age: 75
End: 2020-03-17

## 2020-03-17 VITALS
SYSTOLIC BLOOD PRESSURE: 128 MMHG | HEART RATE: 74 BPM | HEIGHT: 72 IN | OXYGEN SATURATION: 90 % | TEMPERATURE: 97.1 F | WEIGHT: 162.4 LBS | BODY MASS INDEX: 22 KG/M2 | DIASTOLIC BLOOD PRESSURE: 74 MMHG

## 2020-03-17 DIAGNOSIS — R06.00 DYSPNEA, UNSPECIFIED TYPE: ICD-10-CM

## 2020-03-17 DIAGNOSIS — N18.30 STAGE 3 CHRONIC KIDNEY DISEASE (HCC): ICD-10-CM

## 2020-03-17 DIAGNOSIS — Z79.01 LONG TERM CURRENT USE OF ANTICOAGULANTS WITH INR GOAL OF 2.0-3.0: ICD-10-CM

## 2020-03-17 DIAGNOSIS — I82.5Y2 CHRONIC DEEP VEIN THROMBOSIS (DVT) OF PROXIMAL VEIN OF LEFT LOWER EXTREMITY (HCC): Primary | ICD-10-CM

## 2020-03-17 LAB
ANION GAP SERPL CALCULATED.3IONS-SCNC: 12.3 MMOL/L (ref 5–15)
BASOPHILS # BLD AUTO: 0.04 10*3/MM3 (ref 0–0.2)
BASOPHILS NFR BLD AUTO: 0.8 % (ref 0–1.5)
BUN BLD-MCNC: 24 MG/DL (ref 8–23)
BUN/CREAT SERPL: 11.3 (ref 7–25)
CALCIUM SPEC-SCNC: 10.2 MG/DL (ref 8.6–10.5)
CHLORIDE SERPL-SCNC: 102 MMOL/L (ref 98–107)
CO2 SERPL-SCNC: 24.7 MMOL/L (ref 22–29)
CREAT BLD-MCNC: 2.13 MG/DL (ref 0.76–1.27)
DEPRECATED RDW RBC AUTO: 48.6 FL (ref 37–54)
EOSINOPHIL # BLD AUTO: 0.14 10*3/MM3 (ref 0–0.4)
EOSINOPHIL NFR BLD AUTO: 2.7 % (ref 0.3–6.2)
ERYTHROCYTE [DISTWIDTH] IN BLOOD BY AUTOMATED COUNT: 14.9 % (ref 12.3–15.4)
GFR SERPL CREATININE-BSD FRML MDRD: 37 ML/MIN/1.73
GLUCOSE BLD-MCNC: 78 MG/DL (ref 65–99)
HCT VFR BLD AUTO: 33.5 % (ref 37.5–51)
HGB BLD-MCNC: 10.7 G/DL (ref 13–17.7)
IMM GRANULOCYTES # BLD AUTO: 0.02 10*3/MM3 (ref 0–0.05)
IMM GRANULOCYTES NFR BLD AUTO: 0.4 % (ref 0–0.5)
INR PPP: 2.3 (ref 0.9–1.1)
LYMPHOCYTES # BLD AUTO: 0.81 10*3/MM3 (ref 0.7–3.1)
LYMPHOCYTES NFR BLD AUTO: 15.9 % (ref 19.6–45.3)
MCH RBC QN AUTO: 28.9 PG (ref 26.6–33)
MCHC RBC AUTO-ENTMCNC: 31.9 G/DL (ref 31.5–35.7)
MCV RBC AUTO: 90.5 FL (ref 79–97)
MONOCYTES # BLD AUTO: 0.36 10*3/MM3 (ref 0.1–0.9)
MONOCYTES NFR BLD AUTO: 7 % (ref 5–12)
NEUTROPHILS # BLD AUTO: 3.74 10*3/MM3 (ref 1.7–7)
NEUTROPHILS NFR BLD AUTO: 73.2 % (ref 42.7–76)
NRBC BLD AUTO-RTO: 0 /100 WBC (ref 0–0.2)
NT-PROBNP SERPL-MCNC: ABNORMAL PG/ML (ref 5–900)
PLATELET # BLD AUTO: 256 10*3/MM3 (ref 140–450)
PMV BLD AUTO: 10.9 FL (ref 6–12)
POTASSIUM BLD-SCNC: 4.4 MMOL/L (ref 3.5–5.2)
RBC # BLD AUTO: 3.7 10*6/MM3 (ref 4.14–5.8)
SODIUM BLD-SCNC: 139 MMOL/L (ref 136–145)
WBC NRBC COR # BLD: 5.11 10*3/MM3 (ref 3.4–10.8)

## 2020-03-17 PROCEDURE — 71046 X-RAY EXAM CHEST 2 VIEWS: CPT | Performed by: INTERNAL MEDICINE

## 2020-03-17 PROCEDURE — 83880 ASSAY OF NATRIURETIC PEPTIDE: CPT | Performed by: INTERNAL MEDICINE

## 2020-03-17 PROCEDURE — 85025 COMPLETE CBC W/AUTO DIFF WBC: CPT | Performed by: INTERNAL MEDICINE

## 2020-03-17 PROCEDURE — 85610 PROTHROMBIN TIME: CPT | Performed by: INTERNAL MEDICINE

## 2020-03-17 PROCEDURE — 36415 COLL VENOUS BLD VENIPUNCTURE: CPT | Performed by: INTERNAL MEDICINE

## 2020-03-17 PROCEDURE — 80048 BASIC METABOLIC PNL TOTAL CA: CPT | Performed by: INTERNAL MEDICINE

## 2020-03-17 PROCEDURE — 36416 COLLJ CAPILLARY BLOOD SPEC: CPT | Performed by: INTERNAL MEDICINE

## 2020-03-17 PROCEDURE — 99214 OFFICE O/P EST MOD 30 MIN: CPT | Performed by: INTERNAL MEDICINE

## 2020-03-17 NOTE — PROGRESS NOTES
Subjective   Narciso Romano is a 74 y.o. male.  Patient here for follow-up on DVT anticoagulation with Coumadin for same.  Body mass index is 22.03 kg/m².  History of Present Illness   DVT long-term anticoagulant with Coumadin with target range 2-3 for this.  Patient is also having shortness of breath when going up steps but not down steps has both COPD as well as CHF.  No increased ankle edema known CAD as well but no chest pain with this.  No shortness of breath or chest pain on level ground does have a cardiologist.  We will do a chest x-ray and BMP today looking for possible heart failure versus other patient not running a temperature.  No increased sputum.  Favor this been possible CHF  .  Medicines are Cipro causing hives Keflex hives lisinopril hives Norvasc hives Levoxyl undefined and Tiazac undefined.  Patient's former smoker.  Family is positive for heart disease hypertension thyroid disease lung disease kidney disease  Review of Systems   Constitutional: Negative.    HENT: Negative.    Eyes: Negative.    Respiratory: Negative.    Cardiovascular: Negative.    Gastrointestinal: Negative.    Endocrine: Negative.    Genitourinary: Negative.    Musculoskeletal: Negative.    Skin: Negative.    Allergic/Immunologic: Positive for environmental allergies and immunocompromised state.   Neurological: Negative.    Hematological: Bruises/bleeds easily.   Psychiatric/Behavioral: Negative.    All other systems reviewed and are negative.      Objective   Vitals:    03/17/20 0748   BP: 128/74   Pulse: 74   Temp: 97.1 °F (36.2 °C)   SpO2: 90%   Weight: 73.7 kg (162 lb 6.4 oz)     Physical Exam   Constitutional: He appears well-developed and well-nourished.   HENT:   Head: Normocephalic and atraumatic.   Eyes: Pupils are equal, round, and reactive to light. Conjunctivae are normal.   Cardiovascular: Normal rate, regular rhythm and normal heart sounds.   Pulmonary/Chest: Effort normal.   Few rales left base posteriorly no  wheezing noted bilaterally.   Abdominal: Soft. Bowel sounds are normal.   Neurological: He is alert.   Unremarkable gait and station   Skin: Skin is warm and dry.   Nursing note and vitals reviewed.      Lab Results   Component Value Date    INR 2.20 (A) 03/03/2020    INR 1.90 (A) 02/27/2020    INR 1.60 (A) 02/24/2020       Procedures  Chest x-ray paralateral obtained do have some mild haziness bilaterally suggesting possible CHF seen best on PA view also possible left pleural effusion evidence of cardiomegaly.  Pacemaker is present will have radiology over read this.  Comparison chest x-ray from 12/12/2019 does not show increased haziness or a possible pleural effusion on the left.  Cardiomegaly was present at that point in time given will have radiology over read this if feels like this is a p pleural effusion on  left we will get a formal pulmonary consult.  Assessment/Plan   1.  DVT    2.  Anticoagulant with Coumadin plan continue present Coumadin as below follow-up in 1 month's time    5/5/2.5 inr 1mo     3.  Dyspnea undefined await BNP favor this began CHF if so outpatient follow-up with his cardiologist.  Dissipate lab results on Thursday go to ER if condition worsens    4.  CRD stage III get updated lab values in the setting of patient being on diuretics for his heart failure    Much of this encounter note is an electronic transcription/translation of spoken language to printed text.  The electronic translation of spoken language may permit erroneous, or at times, nonsensical words or phrases to be inadvertently transcribed.  Although I have reviewed the note for such errors, some may still exist. If there are questions or for further clarification, please contact me.

## 2020-03-19 DIAGNOSIS — D64.9 ANEMIA, UNSPECIFIED TYPE: ICD-10-CM

## 2020-03-19 DIAGNOSIS — N18.30 STAGE 3 CHRONIC KIDNEY DISEASE (HCC): Primary | ICD-10-CM

## 2020-03-27 ENCOUNTER — LAB (OUTPATIENT)
Dept: FAMILY MEDICINE CLINIC | Facility: CLINIC | Age: 75
End: 2020-03-27

## 2020-03-27 DIAGNOSIS — N18.30 STAGE 3 CHRONIC KIDNEY DISEASE (HCC): ICD-10-CM

## 2020-03-27 DIAGNOSIS — D64.9 ANEMIA, UNSPECIFIED TYPE: ICD-10-CM

## 2020-03-27 LAB
ERYTHROCYTE [DISTWIDTH] IN BLOOD BY AUTOMATED COUNT: 19.1 % (ref 12.3–15.4)
FERRITIN SERPL-MCNC: 135 NG/ML (ref 30–400)
FOLATE SERPL-MCNC: 11.3 NG/ML (ref 4.78–24.2)
HCT VFR BLD AUTO: 33 % (ref 37.5–51)
HGB BLD-MCNC: 10.6 G/DL (ref 13–17.7)
IRON 24H UR-MRATE: 53 MCG/DL (ref 59–158)
IRON SATN MFR SERPL: 17 % (ref 20–50)
LYMPHOCYTES # BLD AUTO: 1.1 10*3/MM3 (ref 0.7–3.1)
LYMPHOCYTES NFR BLD AUTO: 22.1 % (ref 19.6–45.3)
MCH RBC QN AUTO: 29.1 PG (ref 26.6–33)
MCHC RBC AUTO-ENTMCNC: 32.3 G/DL (ref 31.5–35.7)
MCV RBC AUTO: 90.2 FL (ref 79–97)
MONOCYTES # BLD AUTO: 0.3 10*3/MM3 (ref 0.1–0.9)
MONOCYTES NFR BLD AUTO: 5.8 % (ref 5–12)
NEUTROPHILS # BLD AUTO: 3.7 10*3/MM3 (ref 1.7–7)
NEUTROPHILS NFR BLD AUTO: 72.1 % (ref 42.7–76)
PLATELET # BLD AUTO: 225 10*3/MM3 (ref 140–450)
PMV BLD AUTO: 7.9 FL (ref 6–12)
RBC # BLD AUTO: 3.66 10*6/MM3 (ref 4.14–5.8)
TIBC SERPL-MCNC: 317 MCG/DL (ref 298–536)
TRANSFERRIN SERPL-MCNC: 213 MG/DL (ref 200–360)
VIT B12 BLD-MCNC: 397 PG/ML (ref 211–946)
WBC NRBC COR # BLD: 5.1 10*3/MM3 (ref 3.4–10.8)

## 2020-03-27 PROCEDURE — 83540 ASSAY OF IRON: CPT | Performed by: INTERNAL MEDICINE

## 2020-03-27 PROCEDURE — 84466 ASSAY OF TRANSFERRIN: CPT | Performed by: INTERNAL MEDICINE

## 2020-03-27 PROCEDURE — 82728 ASSAY OF FERRITIN: CPT | Performed by: INTERNAL MEDICINE

## 2020-03-27 PROCEDURE — 82607 VITAMIN B-12: CPT | Performed by: INTERNAL MEDICINE

## 2020-03-27 PROCEDURE — 85025 COMPLETE CBC W/AUTO DIFF WBC: CPT | Performed by: INTERNAL MEDICINE

## 2020-03-27 PROCEDURE — 36415 COLL VENOUS BLD VENIPUNCTURE: CPT | Performed by: INTERNAL MEDICINE

## 2020-03-27 PROCEDURE — 82746 ASSAY OF FOLIC ACID SERUM: CPT | Performed by: INTERNAL MEDICINE

## 2020-04-08 RX ORDER — AMOXICILLIN 875 MG/1
TABLET, COATED ORAL
Qty: 20 TABLET | Refills: 0 | OUTPATIENT
Start: 2020-04-08

## 2020-04-14 ENCOUNTER — OFFICE VISIT (OUTPATIENT)
Dept: FAMILY MEDICINE CLINIC | Facility: CLINIC | Age: 75
End: 2020-04-14

## 2020-04-14 VITALS
OXYGEN SATURATION: 91 % | TEMPERATURE: 97.7 F | DIASTOLIC BLOOD PRESSURE: 74 MMHG | HEART RATE: 71 BPM | SYSTOLIC BLOOD PRESSURE: 120 MMHG

## 2020-04-14 DIAGNOSIS — I10 HYPERTENSION, ESSENTIAL: ICD-10-CM

## 2020-04-14 DIAGNOSIS — Z79.01 LONG TERM CURRENT USE OF ANTICOAGULANTS WITH INR GOAL OF 2.0-3.0: ICD-10-CM

## 2020-04-14 DIAGNOSIS — J01.01 ACUTE RECURRENT MAXILLARY SINUSITIS: ICD-10-CM

## 2020-04-14 DIAGNOSIS — N18.30 CRD (CHRONIC RENAL DISEASE), STAGE III (HCC): ICD-10-CM

## 2020-04-14 DIAGNOSIS — I82.5Y2 CHRONIC DEEP VEIN THROMBOSIS (DVT) OF PROXIMAL VEIN OF LEFT LOWER EXTREMITY (HCC): Primary | ICD-10-CM

## 2020-04-14 LAB
ANION GAP SERPL CALCULATED.3IONS-SCNC: 12.7 MMOL/L (ref 5–15)
BUN BLD-MCNC: 33 MG/DL (ref 8–23)
BUN/CREAT SERPL: 14 (ref 7–25)
CALCIUM SPEC-SCNC: 10.2 MG/DL (ref 8.6–10.5)
CHLORIDE SERPL-SCNC: 106 MMOL/L (ref 98–107)
CO2 SERPL-SCNC: 24.3 MMOL/L (ref 22–29)
CREAT BLD-MCNC: 2.36 MG/DL (ref 0.76–1.27)
GFR SERPL CREATININE-BSD FRML MDRD: 33 ML/MIN/1.73
GLUCOSE BLD-MCNC: 94 MG/DL (ref 65–99)
INR PPP: 2.4 (ref 0.9–1.1)
POTASSIUM BLD-SCNC: 4.7 MMOL/L (ref 3.5–5.2)
PROTHROMBIN TIME: 28.5 SECONDS (ref 11–15)
SODIUM BLD-SCNC: 143 MMOL/L (ref 136–145)

## 2020-04-14 PROCEDURE — 99213 OFFICE O/P EST LOW 20 MIN: CPT | Performed by: INTERNAL MEDICINE

## 2020-04-14 PROCEDURE — 85610 PROTHROMBIN TIME: CPT | Performed by: INTERNAL MEDICINE

## 2020-04-14 PROCEDURE — 36415 COLL VENOUS BLD VENIPUNCTURE: CPT | Performed by: INTERNAL MEDICINE

## 2020-04-14 PROCEDURE — 80048 BASIC METABOLIC PNL TOTAL CA: CPT | Performed by: INTERNAL MEDICINE

## 2020-04-14 RX ORDER — AMOXICILLIN 875 MG/1
875 TABLET, COATED ORAL 2 TIMES DAILY
Qty: 20 TABLET | Refills: 0 | Status: SHIPPED | OUTPATIENT
Start: 2020-04-14 | End: 2020-04-24

## 2020-04-14 NOTE — PROGRESS NOTES
Subjective   Narciso Romano is a 74 y.o. male.  Patient with DVT chronic anticoagulant Coumadin target range 2-3 also follow-up on renal status known CRD  There is no height or weight on file to calculate BMI.  History of Present Illness   Patient is currently taking Coumadin 5/5/2.5 stable dose over the last month or 2.  INR today is 2.4 he takes it for DVT chronic also has CRD while stage III now he is being listed as stage IV need to get updated to be MP he has seen his cardiologist Dr. Chavez recently felt like he is doing well history of CHF as well does have some sinus drainage is slightly cloudy history recurrent maxillary sinusitis no temperature with this but will treat for that.  Regalis include Cipro causing hives, Keflex hives of note can take penicillin lisinopril caused hives Norvasc hives Levoxyl undefined Tiazac undefined.  Former smoker.  Family history positive for heart disease hypertension thyroid disease kidney disease  Review of Systems   HENT: Positive for postnasal drip.    All other systems reviewed and are negative.      Objective   There were no vitals filed for this visit.  Blood pressure is 120/74 pulse is 71 temperature is 97.7 weight is been stable by his history.  O2 sats are 91% uses O2 at night to sleep not during the daytime..  BMI last was 22 without any significant weight gain or loss.    Physical Exam   Constitutional: He appears well-developed and well-nourished.   HENT:   Head: Normocephalic and atraumatic.   Right Ear: External ear normal.   Left Ear: External ear normal.   Mouth/Throat: Oropharynx is clear and moist.   Minimal pressure over maxillary sinuses   Eyes: Pupils are equal, round, and reactive to light. Conjunctivae are normal.   Cardiovascular: Normal rate, regular rhythm and normal heart sounds.   Pulmonary/Chest: Effort normal and breath sounds normal.   Slight diminished breath sounds but no rales or wheezes.   Neurological: He is alert.   Unremarkable gait  and station   Skin: Skin is warm and dry.   Nursing note and vitals reviewed.      Lab Results   Component Value Date    INR 2.40 (H) 04/14/2020    INR 2.30 (A) 03/17/2020    INR 2.20 (A) 03/03/2020       Procedures    Assessment/Plan   1.  DVT    2.  Anticoagulation with Coumadin target range 2-3 today's INR is 2.4 last was 2.3 patient will continue his Coumadin at 5/5/2.5 with INR 1 month    3.  CRD stage IV get updated BMP anticipate results in 2 days time    4.  Hypertension controlled continue present medicine    5.  Acute maxillary sinusitis plan amoxicillin 875 twice daily for 10 days time call follow-up if not well in 10 days    Much of this encounter note is an electronic transcription/translation of spoken language to printed text.  The electronic translation of spoken language may permit erroneous, or at times, nonsensical words or phrases to be inadvertently transcribed.  Although I have reviewed the note for such errors, some may still exist. If there are questions or for further clarification, please contact me.

## 2020-04-23 ENCOUNTER — TELEPHONE (OUTPATIENT)
Dept: FAMILY MEDICINE CLINIC | Facility: CLINIC | Age: 75
End: 2020-04-23

## 2020-04-23 NOTE — TELEPHONE ENCOUNTER
YAYA WITH [facility] WITH [employer] CALLING TO VERIFY PATIENT INFORMATION. WAS ADVISED BY OFFICE TO SEND MESSAGE TO MA TO DO THIS.    YAYA CAN BE REACHED [phone] NO EXTENSION.

## 2020-05-12 ENCOUNTER — OFFICE VISIT (OUTPATIENT)
Dept: FAMILY MEDICINE CLINIC | Facility: CLINIC | Age: 75
End: 2020-05-12

## 2020-05-12 VITALS
RESPIRATION RATE: 20 BRPM | BODY MASS INDEX: 22.21 KG/M2 | HEART RATE: 79 BPM | SYSTOLIC BLOOD PRESSURE: 132 MMHG | DIASTOLIC BLOOD PRESSURE: 70 MMHG | WEIGHT: 164 LBS | TEMPERATURE: 98 F | HEIGHT: 72 IN | OXYGEN SATURATION: 94 %

## 2020-05-12 DIAGNOSIS — J44.9 CHRONIC OBSTRUCTIVE PULMONARY DISEASE, UNSPECIFIED COPD TYPE (HCC): ICD-10-CM

## 2020-05-12 DIAGNOSIS — I25.10 CORONARY ARTERY DISEASE INVOLVING NATIVE CORONARY ARTERY OF NATIVE HEART WITHOUT ANGINA PECTORIS: ICD-10-CM

## 2020-05-12 DIAGNOSIS — E78.49 OTHER HYPERLIPIDEMIA: ICD-10-CM

## 2020-05-12 DIAGNOSIS — I10 ESSENTIAL HYPERTENSION: Primary | ICD-10-CM

## 2020-05-12 DIAGNOSIS — I82.5Y2 CHRONIC DEEP VEIN THROMBOSIS (DVT) OF PROXIMAL VEIN OF LEFT LOWER EXTREMITY (HCC): ICD-10-CM

## 2020-05-12 LAB — INR PPP: 2.5 (ref 0.9–1.1)

## 2020-05-12 PROCEDURE — 85610 PROTHROMBIN TIME: CPT | Performed by: FAMILY MEDICINE

## 2020-05-12 PROCEDURE — 99213 OFFICE O/P EST LOW 20 MIN: CPT | Performed by: FAMILY MEDICINE

## 2020-05-12 PROCEDURE — 36416 COLLJ CAPILLARY BLOOD SPEC: CPT | Performed by: FAMILY MEDICINE

## 2020-05-12 NOTE — PROGRESS NOTES
SUBJECTIVE:  The patient is a 74-year-old -American male.  He has chronic DVT, hypertension, coronary artery disease, hyperlipidemia, and COPD.  His current dose of Coumadin is 5 mg for 2 days followed by 2.5 mg.  His INR is 2.5    PAST MEDICAL HISTORY:  Reviewed.    REVIEW OF SYSTEMS:  Please see above; all others reviewed and are negative.      OBJECTIVE:   Vitals signs are reviewed and are stable.    General:  Well-nourished.  Alert and oriented x3 in no acute distress.  HEENT: PERRLA.   Neck:  Supple.   Lungs:  Clear.    Heart:  Regular rate and rhythm.   Abdomen:   Soft, nontender.   Extremities:  No cyanosis, clubbing or edema.   Neurological:  Grossly intact without motor or sensory deficits.     ASSESSMENT:    Chronic DVT  Coronary artery disease  COPD  Hypertension  Hyperlipidemia  PLAN: Continue same dose Coumadin.  Recheck 1 month.  Call if problems.    Dictated utilizing Dragon dictation.

## 2020-05-28 ENCOUNTER — TELEPHONE (OUTPATIENT)
Dept: FAMILY MEDICINE CLINIC | Facility: CLINIC | Age: 75
End: 2020-05-28

## 2020-05-28 NOTE — TELEPHONE ENCOUNTER
Pt called need a copy a current xray for his ankle by Monday. He has a appt with a orthopedic doctor. Please advise    Pt can be reached at [phone]

## 2020-05-29 ENCOUNTER — OFFICE VISIT (OUTPATIENT)
Dept: FAMILY MEDICINE CLINIC | Facility: CLINIC | Age: 75
End: 2020-05-29

## 2020-05-29 VITALS
OXYGEN SATURATION: 93 % | HEIGHT: 72 IN | DIASTOLIC BLOOD PRESSURE: 82 MMHG | TEMPERATURE: 97 F | BODY MASS INDEX: 21.29 KG/M2 | HEART RATE: 88 BPM | WEIGHT: 157.2 LBS | SYSTOLIC BLOOD PRESSURE: 130 MMHG

## 2020-05-29 DIAGNOSIS — R52 PAIN: Primary | ICD-10-CM

## 2020-05-29 PROCEDURE — 99213 OFFICE O/P EST LOW 20 MIN: CPT | Performed by: INTERNAL MEDICINE

## 2020-05-29 PROCEDURE — 73610 X-RAY EXAM OF ANKLE: CPT | Performed by: INTERNAL MEDICINE

## 2020-05-29 NOTE — PROGRESS NOTES
Subjective   Narciso Romano is a 74 y.o. male.  Patient with ongoing ankle pain has been seen before concerned about possible unstable ankle as best I could gather  Body mass index is 21.32 kg/m².  History of Present Illness   Has not seen a specialist regarding ankle in the past was not instrument surgery at that time is due to follow-up does need current x-ray pain with ambulation it does seem to be a weak ankle by his history.  No acute falls or specific injuries been bothered more over the last 1-2 months  l anklepinfo1-2 mo  Review of Systems   All other systems reviewed and are negative.      Objective   Vitals:    05/29/20 1509   BP: 130/82   Pulse: 88   Temp: 97 °F (36.1 °C)   SpO2: 93%   Weight: 71.3 kg (157 lb 3.2 oz)     Physical Exam   Constitutional: He appears well-developed and well-nourished.   HENT:   Head: Normocephalic and atraumatic.   Eyes: Pupils are equal, round, and reactive to light. Conjunctivae are normal.   Cardiovascular: Normal rate, regular rhythm and normal heart sounds.   Pulmonary/Chest: Effort normal.   Decreased breath sounds but no rales or wheezes heard   Musculoskeletal:   Left foot posterior tibial and dorsalis pedis pulse are 1+.  No bruising swelling or other acute injury noted to the foot ankle area.  Left ankle is normal to stressing AP lateral medial motion.  Complains of mild pain with stressing but no laxity is noted.  Good strength with plantar dorsiflexion.  Nontender over Achilles tendon   Neurological: He is alert.   Slow but stable gait and station   Nursing note and vitals reviewed.      Lab Results   Component Value Date    INR 2.50 (A) 05/12/2020    INR 2.40 (H) 04/14/2020    INR 2.30 (A) 03/17/2020       Procedures  X-ray left ankle 3 views obtained.  Comparison from 12/20/2017 available.  Or generative changes noted nothing of an acute nature particular at the lateral malleolus no other bony or soft tissue maladies are noted.  Assessment/Plan   Left ankle  pain plan disc of x-ray obtained.  Patient is to see Steff and Dr. Martinez's group for further assessment.  Follow-up as needed    Ankle painl      Much of this encounter note is an electronic transcription/translation of spoken language to printed text.  The electronic translation of spoken language may permit erroneous, or at times, nonsensical words or phrases to be inadvertently transcribed.  Although I have reviewed the note for such errors, some may still exist. If there are questions or for further clarification, please contact me.

## 2020-06-09 RX ORDER — HYDRALAZINE HYDROCHLORIDE 100 MG/1
TABLET, FILM COATED ORAL
Qty: 270 TABLET | Refills: 3 | Status: SHIPPED | OUTPATIENT
Start: 2020-06-09 | End: 2021-01-01 | Stop reason: SDUPTHER

## 2020-06-29 ENCOUNTER — OFFICE VISIT (OUTPATIENT)
Dept: FAMILY MEDICINE CLINIC | Facility: CLINIC | Age: 75
End: 2020-06-29

## 2020-06-29 VITALS
RESPIRATION RATE: 20 BRPM | OXYGEN SATURATION: 94 % | TEMPERATURE: 97.8 F | HEIGHT: 72 IN | SYSTOLIC BLOOD PRESSURE: 120 MMHG | DIASTOLIC BLOOD PRESSURE: 72 MMHG | HEART RATE: 76 BPM | BODY MASS INDEX: 19.5 KG/M2 | WEIGHT: 144 LBS

## 2020-06-29 DIAGNOSIS — N18.5: ICD-10-CM

## 2020-06-29 DIAGNOSIS — J01.01 ACUTE RECURRENT MAXILLARY SINUSITIS: ICD-10-CM

## 2020-06-29 DIAGNOSIS — I10 HYPERTENSION, ESSENTIAL: ICD-10-CM

## 2020-06-29 DIAGNOSIS — J18.9 PNEUMONITIS: Primary | ICD-10-CM

## 2020-06-29 DIAGNOSIS — M25.552 ACUTE HIP PAIN, LEFT: ICD-10-CM

## 2020-06-29 PROCEDURE — 73502 X-RAY EXAM HIP UNI 2-3 VIEWS: CPT | Performed by: INTERNAL MEDICINE

## 2020-06-29 PROCEDURE — 99214 OFFICE O/P EST MOD 30 MIN: CPT | Performed by: INTERNAL MEDICINE

## 2020-06-29 PROCEDURE — 71046 X-RAY EXAM CHEST 2 VIEWS: CPT | Performed by: INTERNAL MEDICINE

## 2020-06-29 RX ORDER — CALCIUM CARBONATE 200(500)MG
1 TABLET,CHEWABLE ORAL DAILY
COMMUNITY
End: 2021-01-01

## 2020-06-29 RX ORDER — AMOXICILLIN 875 MG/1
875 TABLET, COATED ORAL 2 TIMES DAILY
Qty: 20 TABLET | Refills: 0 | Status: SHIPPED | OUTPATIENT
Start: 2020-06-29 | End: 2020-07-09

## 2020-06-29 RX ORDER — DICYCLOMINE HYDROCHLORIDE 10 MG/1
10 CAPSULE ORAL 4 TIMES DAILY
COMMUNITY
Start: 2020-06-17

## 2020-06-29 RX ORDER — LIDOCAINE 50 MG/G
PATCH TOPICAL
COMMUNITY
Start: 2020-06-17

## 2020-06-29 RX ORDER — METOPROLOL SUCCINATE 25 MG/1
25 TABLET, EXTENDED RELEASE ORAL 2 TIMES DAILY
COMMUNITY
Start: 2020-06-17 | End: 2020-01-01 | Stop reason: SDUPTHER

## 2020-06-29 RX ORDER — APIXABAN 2.5 MG/1
2.5 TABLET, FILM COATED ORAL 2 TIMES DAILY
COMMUNITY
Start: 2020-06-17 | End: 2020-07-21 | Stop reason: SDUPTHER

## 2020-06-29 RX ORDER — ISOSORBIDE MONONITRATE 30 MG/1
30 TABLET, EXTENDED RELEASE ORAL DAILY
COMMUNITY
Start: 2020-06-17 | End: 2022-11-11

## 2020-06-29 RX ORDER — BUDESONIDE AND FORMOTEROL FUMARATE DIHYDRATE 160; 4.5 UG/1; UG/1
AEROSOL RESPIRATORY (INHALATION)
COMMUNITY
Start: 2020-06-17

## 2020-06-29 RX ORDER — POLYETHYLENE GLYCOL 3350 17 G/17G
POWDER, FOR SOLUTION ORAL
COMMUNITY
Start: 2020-06-22

## 2020-06-29 RX ORDER — CINACALCET 30 MG/1
30 TABLET, FILM COATED ORAL DAILY
COMMUNITY
Start: 2020-06-17

## 2020-06-29 NOTE — PROGRESS NOTES
Subjective   Narciso Romano is a 74 y.o. male.   Body mass index is 19.53 kg/m².  History of Present Illness   Recent hospitalization for pneumonia and now has renal failure is on dialysis 3 times weekly Tuesday Thursday and Saturday.  He does not drop his blood pressure down he is not sure how much further trauma pull off still urinates without trouble increasing pain in the left hip with last few weeks no injury with this.  Is worse when he is in his dialysis chair no locking or giving just pain also including if he is laying down now standing etc.  New sinus drainage cloudy in nature no reported temperature will get amoxicillin for that currently is not on antibiotics for his recent pneumonia we will get updated x-ray of his chest also get left hip x-ray.  Pressure is very satisfactory today.  He is going to be getting his a shunt put in his right arm so we are not to draw lab work from right arm.  I did tell him to tell anybody who might see drawing blood work starting IV this.  Drug allergies include Cipro Keflex lisinopril which caused hives no rash times Levoxyl undefined Tiazac undefined.  Former smoker.  Family is positive heart disease hypertension thyroid disease lung disease kidney disease  Review of Systems   All other systems reviewed and are negative.      Objective   Vitals:    06/29/20 0745   BP: 120/72   Pulse: 76   Resp: 20   Temp: 97.8 °F (36.6 °C)   SpO2: 94%   Weight: 65.3 kg (144 lb)     Physical Exam   Constitutional: He appears well-developed and well-nourished.   HENT:   Head: Normocephalic and atraumatic.   Eyes: Pupils are equal, round, and reactive to light. Conjunctivae are normal.   Cardiovascular: Normal rate and regular rhythm.   Pulmonary/Chest: Effort normal and breath sounds normal.   No rales or wheezes heard.   Abdominal: Soft. Bowel sounds are normal.   Musculoskeletal:   Pain with palpation very proximal hip laterally.  No focal tenderness no increased pain with flexion of  the hip joint internal extra rotation does have gout in both knees left knee is little sore but nothing attributable to hip on today's examination   Neurological: He is alert.   Relatively slow but stable gait and station   Skin: Skin is warm and dry.       Lab Results   Component Value Date    INR 2.50 (A) 05/12/2020    INR 2.40 (H) 04/14/2020    INR 2.30 (A) 03/17/2020       Procedures  X-ray left hip 2 views obtained.  Minimal degenerative changes noted.  No acute abnormalities apparent.  No comparison available.    Chest x-ray AP lateral obtained.  As a catheter placement visible now for dialysis not present on last x-ray there is cardiomegaly no active parenchymal infiltrate seen.  Do not see any residual pneumonia does have a prominence lateral to the right heart border not visualized on comparison x-ray that we have.  Do have a comparison chest x-ray from 3/17/2020 still shows pacemaker is today status but no right lung lesion infiltrate etc.  Will have radiology over read this to get their input  Assessment/Plan     1.  Pneumonia status post hospitalization currently off antibiotics will get chest x-ray today which we occurred do not see residual pneumonia    2.  CRD now stage V currently on dialysis getting dialysis every Tuesday Thursday and Saturday    3.  Hypertension controlled continue present medicines    4.  Acute maxillary sinusitis plan amoxicillin 875 twice daily for 10 days time follow-up call if not well in 10 days time    5.  Left hip pain plan patient see Dr. Martinez group disc of x-ray goes with him.  Tentative follow-up will be in 1 month's time and contingent on pending findings    Much of this encounter note is an electronic transcription/translation of spoken language to printed text.  The electronic translation of spoken language may permit erroneous, or at times, nonsensical words or phrases to be inadvertently transcribed.  Although I have reviewed the note for such errors, some may  still exist. If there are questions or for further clarification, please contact me.

## 2020-07-06 RX ORDER — TAMSULOSIN HYDROCHLORIDE 0.4 MG/1
CAPSULE ORAL
Qty: 90 CAPSULE | Refills: 3 | Status: SHIPPED | OUTPATIENT
Start: 2020-07-06

## 2020-07-21 RX ORDER — APIXABAN 2.5 MG/1
2.5 TABLET, FILM COATED ORAL 2 TIMES DAILY
Qty: 60 TABLET | Refills: 5 | Status: SHIPPED | OUTPATIENT
Start: 2020-07-21 | End: 2021-01-01 | Stop reason: SDUPTHER

## 2020-07-21 NOTE — TELEPHONE ENCOUNTER
Caller: Narciso Romano    Relationship: Self    Best call back number:     Medication needed:   Requested Prescriptions     Pending Prescriptions Disp Refills   • ELIQUIS 2.5 MG tablet tablet       Sig: Take 1 tablet by mouth 2 (Two) Times a Day.       When do you need the refill by: 7/21/2020    What details did the patient provide when requesting the medication: Patient is out of medication completely.    Does the patient have less than a 3 day supply:  [x] Yes  [] No    What is the patient's preferred pharmacy: [id number] - [place], [place] - [facility] [address] [place] [address] RD. AT [facility] [phone] [facility] [phone]

## 2020-07-24 ENCOUNTER — TELEPHONE (OUTPATIENT)
Dept: FAMILY MEDICINE CLINIC | Facility: CLINIC | Age: 75
End: 2020-07-24

## 2020-07-24 NOTE — TELEPHONE ENCOUNTER
Did clinical questions over the phone  Reference number [id number]  [phone]  PA was submitted, waiting on response

## 2020-07-24 NOTE — TELEPHONE ENCOUNTER
NP on call 7/23/2020 5:30pm, informed recently changed from coumadin to eliquis while in hospital, unable to get refill of eliquis from pharmacy d/t insurance. Patient out of medication, called patient's pharmacy and unable to refill d/t hold from insurance. Can we check on this today for patient?

## 2020-07-27 ENCOUNTER — OFFICE VISIT (OUTPATIENT)
Dept: FAMILY MEDICINE CLINIC | Facility: CLINIC | Age: 75
End: 2020-07-27

## 2020-07-27 VITALS
OXYGEN SATURATION: 91 % | HEART RATE: 82 BPM | DIASTOLIC BLOOD PRESSURE: 74 MMHG | HEIGHT: 72 IN | BODY MASS INDEX: 19.42 KG/M2 | WEIGHT: 143.4 LBS | TEMPERATURE: 98.4 F | SYSTOLIC BLOOD PRESSURE: 138 MMHG

## 2020-07-27 DIAGNOSIS — R10.9 ABDOMINAL PAIN, UNSPECIFIED ABDOMINAL LOCATION: Primary | ICD-10-CM

## 2020-07-27 DIAGNOSIS — I82.5Y2 CHRONIC DEEP VEIN THROMBOSIS (DVT) OF PROXIMAL VEIN OF LEFT LOWER EXTREMITY (HCC): ICD-10-CM

## 2020-07-27 DIAGNOSIS — I10 HYPERTENSION, ESSENTIAL: ICD-10-CM

## 2020-07-27 LAB
ALBUMIN SERPL-MCNC: 4.4 G/DL (ref 3.5–5.2)
ALBUMIN/GLOB SERPL: 1.5 G/DL
ALP SERPL-CCNC: 72 U/L (ref 39–117)
ALT SERPL W P-5'-P-CCNC: 10 U/L (ref 1–41)
AMYLASE SERPL-CCNC: 145 U/L (ref 28–100)
ANION GAP SERPL CALCULATED.3IONS-SCNC: 10.7 MMOL/L (ref 5–15)
AST SERPL-CCNC: 31 U/L (ref 1–40)
BACTERIA UR QL AUTO: ABNORMAL /HPF
BILIRUB SERPL-MCNC: 0.3 MG/DL (ref 0–1.2)
BILIRUB UR QL STRIP: NEGATIVE
BUN SERPL-MCNC: 35 MG/DL (ref 8–23)
BUN/CREAT SERPL: 12.8 (ref 7–25)
CALCIUM SPEC-SCNC: 10.6 MG/DL (ref 8.6–10.5)
CHLORIDE SERPL-SCNC: 101 MMOL/L (ref 98–107)
CLARITY UR: CLEAR
CO2 SERPL-SCNC: 29.3 MMOL/L (ref 22–29)
COLOR UR: YELLOW
CREAT SERPL-MCNC: 2.74 MG/DL (ref 0.76–1.27)
ERYTHROCYTE [DISTWIDTH] IN BLOOD BY AUTOMATED COUNT: 19.2 % (ref 12.3–15.4)
GFR SERPL CREATININE-BSD FRML MDRD: 28 ML/MIN/1.73
GLOBULIN UR ELPH-MCNC: 2.9 GM/DL
GLUCOSE SERPL-MCNC: 72 MG/DL (ref 65–99)
GLUCOSE UR STRIP-MCNC: NEGATIVE MG/DL
HCT VFR BLD AUTO: 36.8 % (ref 37.5–51)
HGB BLD-MCNC: 11.1 G/DL (ref 13–17.7)
HGB UR QL STRIP.AUTO: NEGATIVE
KETONES UR QL STRIP: NEGATIVE
LEUKOCYTE ESTERASE UR QL STRIP.AUTO: NEGATIVE
LIPASE SERPL-CCNC: 29 U/L (ref 13–60)
LYMPHOCYTES # BLD AUTO: 1.1 10*3/MM3 (ref 0.7–3.1)
LYMPHOCYTES NFR BLD AUTO: 11.3 % (ref 19.6–45.3)
MCH RBC QN AUTO: 28.1 PG (ref 26.6–33)
MCHC RBC AUTO-ENTMCNC: 30.2 G/DL (ref 31.5–35.7)
MCV RBC AUTO: 93.1 FL (ref 79–97)
MONOCYTES # BLD AUTO: 0.4 10*3/MM3 (ref 0.1–0.9)
MONOCYTES NFR BLD AUTO: 3.6 % (ref 5–12)
NEUTROPHILS NFR BLD AUTO: 8.5 10*3/MM3 (ref 1.7–7)
NEUTROPHILS NFR BLD AUTO: 85.1 % (ref 42.7–76)
NITRITE UR QL STRIP: NEGATIVE
PH UR STRIP.AUTO: 6.5 [PH] (ref 4.6–8)
PLATELET # BLD AUTO: 329 10*3/MM3 (ref 140–450)
PMV BLD AUTO: 8.2 FL (ref 6–12)
POTASSIUM SERPL-SCNC: 4.2 MMOL/L (ref 3.5–5.2)
PROT SERPL-MCNC: 7.3 G/DL (ref 6–8.5)
PROT UR QL STRIP: ABNORMAL
RBC # BLD AUTO: 3.95 10*6/MM3 (ref 4.14–5.8)
RBC # UR: ABNORMAL /HPF
REF LAB TEST METHOD: ABNORMAL
SODIUM SERPL-SCNC: 141 MMOL/L (ref 136–145)
SP GR UR STRIP: 1.02 (ref 1–1.03)
SQUAMOUS #/AREA URNS HPF: ABNORMAL /HPF
TRANS CELLS #/AREA URNS HPF: ABNORMAL /HPF
UROBILINOGEN UR QL STRIP: ABNORMAL
WBC # BLD AUTO: 10 10*3/MM3 (ref 3.4–10.8)
WBC UR QL AUTO: ABNORMAL /HPF

## 2020-07-27 PROCEDURE — 82150 ASSAY OF AMYLASE: CPT | Performed by: INTERNAL MEDICINE

## 2020-07-27 PROCEDURE — 81001 URINALYSIS AUTO W/SCOPE: CPT | Performed by: INTERNAL MEDICINE

## 2020-07-27 PROCEDURE — 83690 ASSAY OF LIPASE: CPT | Performed by: INTERNAL MEDICINE

## 2020-07-27 PROCEDURE — 36415 COLL VENOUS BLD VENIPUNCTURE: CPT | Performed by: INTERNAL MEDICINE

## 2020-07-27 PROCEDURE — 80053 COMPREHEN METABOLIC PANEL: CPT | Performed by: INTERNAL MEDICINE

## 2020-07-27 PROCEDURE — 99214 OFFICE O/P EST MOD 30 MIN: CPT | Performed by: INTERNAL MEDICINE

## 2020-07-27 PROCEDURE — 85025 COMPLETE CBC W/AUTO DIFF WBC: CPT | Performed by: INTERNAL MEDICINE

## 2020-07-27 NOTE — PROGRESS NOTES
Subjective   Narciso Romano is a 74 y.o. male.  Patient was left upper quadrant discomfort by description several days time help possibly somewhat with food.  No change in bowel habits  Body mass index is 19.45 kg/m².  History of Present Illness   Fhas known hiatal hernia is having left-sided abdominal pain left upper abdomen was hospitalized several months ago with GI bleeding resulting him he is switch from Coumadin to Eliquis.  No black stools no blood in stool no change in bowel habits.  No increased temperature on exam pain is actually more mid left abdomen if not high.  Concern for diverticulitis with patient no personal history thereof.  Is on Eliquis at this point also we will get lab work and proceed with a CT of abdomen without contrast today history of chronic DVT.  Blood pressure satisfactory known GERD on hiatal hernia pain is been left abdomen as discussed discomfort can come and go be fairly intense when it occurs as well as not a bad moment for patient..  Patient is on dialysis 3 times a week currently      Cipro causing hives Keflex hives lisinopril hives Norvasc hives Tiazac undefined Levoxyl undefined.  Former smoker.  Family is positive for heart disease hypertension thyroid disease lung disease kidney disease  Review of Systems   Constitutional: Negative.    HENT: Negative.    Eyes: Negative.    Respiratory: Negative.    Cardiovascular: Negative.    Gastrointestinal: Positive for abdominal pain.   Genitourinary: Negative.    Musculoskeletal: Negative.    Skin: Negative.    Allergic/Immunologic: Positive for environmental allergies and immunocompromised state.   Neurological: Negative.    Hematological: Negative.    Psychiatric/Behavioral: Negative.        Objective   Vitals:    07/27/20 0737   BP: 138/74   Pulse: 82   Temp: 98.4 °F (36.9 °C)   SpO2: 91%   Weight: 65 kg (143 lb 6.4 oz)     Physical Exam   Constitutional: He appears well-developed and well-nourished.   HENT:   Head:  Normocephalic and atraumatic.   Eyes: Pupils are equal, round, and reactive to light. Conjunctivae are normal.   Cardiovascular: Normal rate, regular rhythm and normal heart sounds.   Pulmonary/Chest: Effort normal and breath sounds normal.   Abdominal: Soft. Bowel sounds are normal.   Pain with palpation mid abdomen left no guarding no rebound left lower quadrant seems relatively benign as is low and left lower quadrant.   Neurological: He is alert.   Unremarkable gait and station   Skin: Skin is warm and dry.   Nursing note and vitals reviewed.      Lab Results   Component Value Date    INR 2.50 (A) 05/12/2020    INR 2.40 (H) 04/14/2020    INR 2.30 (A) 03/17/2020       Procedures    Assessment/Plan     .  1.  Abdominal pain left undefined plan proceed with CT then pelvis without contrast await pending results labs for abdominal pain pending as well.  The pain should worsen before getting his test done proceed to ER.    2.  Hypertension controlled    3.  DVT with long-term anticoagulation currently on Eliquis thus increased risk of internal bleeding or hematoma    Much of this encounter note is an electronic transcription/translation of spoken language to printed text.  The electronic translation of spoken language may permit erroneous, or at times, nonsensical words or phrases to be inadvertently transcribed.  Although I have reviewed the note for such errors, some may still exist. If there are questions or for further clarification, please contact me.

## 2020-07-29 ENCOUNTER — TELEPHONE (OUTPATIENT)
Dept: FAMILY MEDICINE CLINIC | Facility: CLINIC | Age: 75
End: 2020-07-29

## 2020-07-29 NOTE — TELEPHONE ENCOUNTER
Get BMP Monday please check with patient see if he is on dialysis at this point 3 times a week for lab to patient's nephrologist also see when his CAT scan is currently scheduled.

## 2020-07-29 NOTE — TELEPHONE ENCOUNTER
Patient advised to call in for lab results today. Stated depending on results may have to be hospitalized and would like a call back asap.     Best call back [phone]   Spoke with patient.  She states she started the losartan on Saturday.  She states on today her blood pressure was lower than her normal.  She states at about 11 am it was 123/?   She states it dropped to 100/55 by 3 pm.  She states she doesn't feel like herself.  She states nothing emergent, she is at work.  Patient is asking if she should cut back on the losartan.  Please advise.

## 2020-07-29 NOTE — TELEPHONE ENCOUNTER
Your renal function has declined.  It is been roughly this in the past.  We will let Dr. Black check the data tomorrow and see what he recommends.  If you feel like you are in distress you should go to the emergency room.

## 2020-07-29 NOTE — TELEPHONE ENCOUNTER
Patient stated Dialysis is 3x week,   Had CT already,  [facility] to fax results.  .[facility]

## 2020-07-31 DIAGNOSIS — N28.9 RENAL LESION: Primary | ICD-10-CM

## 2020-07-31 DIAGNOSIS — R74.8 ELEVATED AMYLASE: Primary | ICD-10-CM

## 2020-08-24 ENCOUNTER — OFFICE VISIT (OUTPATIENT)
Dept: FAMILY MEDICINE CLINIC | Facility: CLINIC | Age: 75
End: 2020-08-24

## 2020-08-24 VITALS
HEART RATE: 63 BPM | OXYGEN SATURATION: 91 % | BODY MASS INDEX: 20.59 KG/M2 | WEIGHT: 152 LBS | SYSTOLIC BLOOD PRESSURE: 132 MMHG | DIASTOLIC BLOOD PRESSURE: 80 MMHG | TEMPERATURE: 98.2 F | HEIGHT: 72 IN

## 2020-08-24 DIAGNOSIS — E78.49 OTHER HYPERLIPIDEMIA: ICD-10-CM

## 2020-08-24 DIAGNOSIS — N18.4 CRD (CHRONIC RENAL DISEASE), STAGE IV (HCC): ICD-10-CM

## 2020-08-24 DIAGNOSIS — I10 HYPERTENSION, ESSENTIAL: ICD-10-CM

## 2020-08-24 DIAGNOSIS — R18.8 PELVIC ASCITES: ICD-10-CM

## 2020-08-24 DIAGNOSIS — R10.32 LLQ PAIN: Primary | ICD-10-CM

## 2020-08-24 PROBLEM — Z79.01 WARFARIN ANTICOAGULATION: Status: RESOLVED | Noted: 2019-09-09 | Resolved: 2020-08-24

## 2020-08-24 LAB
ALBUMIN SERPL-MCNC: 4 G/DL (ref 3.5–5.2)
ALBUMIN/GLOB SERPL: 1.7 G/DL
ALP SERPL-CCNC: 66 U/L (ref 39–117)
ALT SERPL W P-5'-P-CCNC: 20 U/L (ref 1–41)
ANION GAP SERPL CALCULATED.3IONS-SCNC: 8.2 MMOL/L (ref 5–15)
AST SERPL-CCNC: 39 U/L (ref 1–40)
BACTERIA UR QL AUTO: NORMAL /HPF
BILIRUB SERPL-MCNC: 0.2 MG/DL (ref 0–1.2)
BILIRUB UR QL STRIP: NEGATIVE
BUN SERPL-MCNC: 22 MG/DL (ref 8–23)
BUN/CREAT SERPL: 9.2 (ref 7–25)
CALCIUM SPEC-SCNC: 11.4 MG/DL (ref 8.6–10.5)
CHLORIDE SERPL-SCNC: 107 MMOL/L (ref 98–107)
CHOLEST SERPL-MCNC: 234 MG/DL (ref 0–200)
CLARITY UR: CLEAR
CO2 SERPL-SCNC: 22.8 MMOL/L (ref 22–29)
COLOR UR: YELLOW
CREAT SERPL-MCNC: 2.4 MG/DL (ref 0.76–1.27)
ERYTHROCYTE [DISTWIDTH] IN BLOOD BY AUTOMATED COUNT: 16.6 % (ref 12.3–15.4)
GFR SERPL CREATININE-BSD FRML MDRD: 32 ML/MIN/1.73
GLOBULIN UR ELPH-MCNC: 2.3 GM/DL
GLUCOSE SERPL-MCNC: 80 MG/DL (ref 65–99)
GLUCOSE UR STRIP-MCNC: NEGATIVE MG/DL
HCT VFR BLD AUTO: 36.6 % (ref 37.5–51)
HDLC SERPL-MCNC: 75 MG/DL (ref 40–60)
HGB BLD-MCNC: 11.9 G/DL (ref 13–17.7)
HGB UR QL STRIP.AUTO: NEGATIVE
KETONES UR QL STRIP: NEGATIVE
LDLC SERPL CALC-MCNC: 137 MG/DL (ref 0–100)
LDLC/HDLC SERPL: 1.83 {RATIO}
LEUKOCYTE ESTERASE UR QL STRIP.AUTO: NEGATIVE
LYMPHOCYTES # BLD AUTO: 0.9 10*3/MM3 (ref 0.7–3.1)
LYMPHOCYTES NFR BLD AUTO: 19.2 % (ref 19.6–45.3)
MCH RBC QN AUTO: 30 PG (ref 26.6–33)
MCHC RBC AUTO-ENTMCNC: 32.6 G/DL (ref 31.5–35.7)
MCV RBC AUTO: 92.2 FL (ref 79–97)
MONOCYTES # BLD AUTO: 0.2 10*3/MM3 (ref 0.1–0.9)
MONOCYTES NFR BLD AUTO: 4.8 % (ref 5–12)
NEUTROPHILS NFR BLD AUTO: 3.6 10*3/MM3 (ref 1.7–7)
NEUTROPHILS NFR BLD AUTO: 76 % (ref 42.7–76)
NITRITE UR QL STRIP: NEGATIVE
PH UR STRIP.AUTO: 5.5 [PH] (ref 4.6–8)
PLATELET # BLD AUTO: 173 10*3/MM3 (ref 140–450)
PMV BLD AUTO: 7.8 FL (ref 6–12)
POTASSIUM SERPL-SCNC: 5.1 MMOL/L (ref 3.5–5.2)
PROT SERPL-MCNC: 6.3 G/DL (ref 6–8.5)
PROT UR QL STRIP: NEGATIVE
RBC # BLD AUTO: 3.97 10*6/MM3 (ref 4.14–5.8)
RBC # UR: NORMAL /HPF
REF LAB TEST METHOD: NORMAL
SODIUM SERPL-SCNC: 138 MMOL/L (ref 136–145)
SP GR UR STRIP: 1.02 (ref 1–1.03)
SQUAMOUS #/AREA URNS HPF: NORMAL /HPF
TRIGL SERPL-MCNC: 110 MG/DL (ref 0–150)
UROBILINOGEN UR QL STRIP: NORMAL
VLDLC SERPL-MCNC: 22 MG/DL (ref 5–40)
WBC # BLD AUTO: 4.7 10*3/MM3 (ref 3.4–10.8)
WBC UR QL AUTO: NORMAL /HPF

## 2020-08-24 PROCEDURE — 81001 URINALYSIS AUTO W/SCOPE: CPT | Performed by: INTERNAL MEDICINE

## 2020-08-24 PROCEDURE — 36415 COLL VENOUS BLD VENIPUNCTURE: CPT | Performed by: INTERNAL MEDICINE

## 2020-08-24 PROCEDURE — 80053 COMPREHEN METABOLIC PANEL: CPT | Performed by: INTERNAL MEDICINE

## 2020-08-24 PROCEDURE — 80061 LIPID PANEL: CPT | Performed by: INTERNAL MEDICINE

## 2020-08-24 PROCEDURE — 85025 COMPLETE CBC W/AUTO DIFF WBC: CPT | Performed by: INTERNAL MEDICINE

## 2020-08-24 PROCEDURE — 99214 OFFICE O/P EST MOD 30 MIN: CPT | Performed by: INTERNAL MEDICINE

## 2020-08-24 RX ORDER — ISOSORBIDE DINITRATE 20 MG/1
TABLET ORAL
COMMUNITY
Start: 2012-02-03 | End: 2021-01-01 | Stop reason: SDUPTHER

## 2020-08-24 NOTE — PROGRESS NOTES
Subjective   Narciso Romano is a 74 y.o. male.   There is no height or weight on file to calculate BMI.  Patient here for follow-up on chronic renal disease stage IV-V as well as blood pressure which is satisfactory today recent CAT scan which he wishes us to explain to him.  History of Present Illness   As above recent CAT scan with some minor abnormalities which were discussed moment blood pressure is good patient is no longer taking dialysis for the last 3 weeks he is doing fairly well we will get the updated CMP on him as well as CBC by that information for his prior CRD stage V is now stage IV we will obtain lab work and send it to his nephrologist Dr. Smith.  He is wondering whether contact sent out of this chest I suggest they be wanting wait to see if he tolerates this or if he needs go back on dialysis he says he has had vein mapping done but not clear not living for his best I can tell with getting a venous shunt developed.  Regarding CAT scan it did show evidence of cholelithiasis that show a small hernia.  He does have a surgeon he sees additionally did show a small amount of pelvic ascites.  I am somewhat unclear on that as he was recently dialysis patient will be checking liver enzymes patient has an intermittent left lower quadrant pain is not clear the etiology CAT scan did not show an overt source from that we will have patient see his general surgeon again he believes Dr. Castellanos.  Otherwise okay CAT scan references from 7/27/[facility] or or [facility]      Cipro causing hives, Keflex hives, lisinopril hives, Norvasc hives, Levoxyl undefined and Tiazac undefined.  Former smoker.  Family is positive heart disease hypertension thyroid disease lung c disease kidney disease  Review of Systems   All other systems reviewed and are negative.      Objective   There were no vitals filed for this visit.      Physical Exam   Constitutional: He appears well-developed and well-nourished.   HENT:   Head:  Normocephalic and atraumatic.   Eyes: Pupils are equal, round, and reactive to light. Conjunctivae are normal. No scleral icterus.   Cardiovascular: Normal rate, regular rhythm and normal heart sounds.   Pulmonary/Chest: Effort normal and breath sounds normal.   Abdominal: Soft.   Neurological: He is alert.   Unremarkable gait and station   Skin: Skin is warm and dry.   Nursing note and vitals reviewed.      Lab Results   Component Value Date    INR 2.50 (A) 05/12/2020    INR 2.40 (H) 04/14/2020    INR 2.30 (A) 03/17/2020       Procedures    Assessment/Plan 1.  Left lower quadrant pain get updated CBC patient see his general surgeon Dr. Castellanos for further evaluation    2.  CRD stage IV suggested by history await pending lab to send lab work to patient's nephrologist     Dr. Smith nephrologist patient uses will need his lab    3.  Hypertension controlled continue present medicine with recheck in 3 months    4.  Pelvic ascites get input from general surgeon    5.  Hyperlipidemia get updated lab work.    Much of this encounter note is an electronic transcription/translation of spoken language to printed text.  The electronic translation of spoken language may permit erroneous, or at times, nonsensical words or phrases to be inadvertently transcribed.  Although I have reviewed the note for such errors, some may still exist. If there are questions or for further clarification, please contact me.

## 2020-08-26 DIAGNOSIS — R93.1 ELEVATED CORONARY ARTERY CALCIUM SCORE: Primary | ICD-10-CM

## 2020-08-28 ENCOUNTER — TELEPHONE (OUTPATIENT)
Dept: FAMILY MEDICINE CLINIC | Facility: CLINIC | Age: 75
End: 2020-08-28

## 2020-08-28 NOTE — TELEPHONE ENCOUNTER
PT CALLED TO REQUEST HIS RECENT CT SCAN RESULTS AND BLOOD WORK RESULTS BE SENT TO HIS GENERAL SURGEON:    DR. JELENA PHILLIPS  [facility]  PH: [phone]    PT HAS AN APPOINTMENT WITH DR. PHILLIPS THIS MONDAY    CALLBACK NUMBER: [phone]

## 2020-08-31 DIAGNOSIS — R10.32 LEFT LOWER QUADRANT PAIN: Primary | ICD-10-CM

## 2020-09-03 ENCOUNTER — TELEPHONE (OUTPATIENT)
Dept: FAMILY MEDICINE CLINIC | Facility: CLINIC | Age: 75
End: 2020-09-03

## 2020-09-03 NOTE — TELEPHONE ENCOUNTER
PATIENT STATES: that he would like for us to send over his CT scan, labs, and urning samples     [facility] [address] [place] [address] phone is [phone] please advise  Fax: [phone]     He would like for us to call over there before sending info to make sure its been sent to the right place. He needs this sent over today. His appt is 9/4/2020 @ 9:00      PATIENT CAN BE REACHED ON:[phone]

## 2020-09-25 ENCOUNTER — TELEPHONE (OUTPATIENT)
Dept: FAMILY MEDICINE CLINIC | Facility: CLINIC | Age: 75
End: 2020-09-25

## 2020-09-25 NOTE — TELEPHONE ENCOUNTER
Patient calling and states he was to have an appointment with Dr Black on 9/28 at 8:15 for a one month follow up. [facility] advised patient that nothing was scheduled and that his last appointment was on 8/24 at 8:15 with Dr Black. He is very upset and confused as to why his appointment was not scheduled. [facility] offered to make appointment for the patient but he declined. [facility] offered to see if he could speak with Nusrat (as he requested) but he declined. States he will call back. Before hanging up, [facility] stated they would send message back to see what happened and for office to call him back about this.     Patient can be reached at [phone].

## 2020-10-05 ENCOUNTER — OFFICE VISIT (OUTPATIENT)
Dept: FAMILY MEDICINE CLINIC | Facility: CLINIC | Age: 75
End: 2020-10-05

## 2020-10-05 VITALS
OXYGEN SATURATION: 90 % | HEIGHT: 72 IN | WEIGHT: 162.6 LBS | SYSTOLIC BLOOD PRESSURE: 148 MMHG | HEART RATE: 80 BPM | DIASTOLIC BLOOD PRESSURE: 76 MMHG | TEMPERATURE: 96.9 F | BODY MASS INDEX: 22.02 KG/M2

## 2020-10-05 DIAGNOSIS — J96.11 CHRONIC RESPIRATORY FAILURE WITH HYPOXIA, ON HOME OXYGEN THERAPY (HCC): Primary | ICD-10-CM

## 2020-10-05 DIAGNOSIS — I10 HYPERTENSION, ESSENTIAL: ICD-10-CM

## 2020-10-05 DIAGNOSIS — J01.01 ACUTE RECURRENT MAXILLARY SINUSITIS: ICD-10-CM

## 2020-10-05 DIAGNOSIS — I50.32 CHRONIC DIASTOLIC CONGESTIVE HEART FAILURE (HCC): ICD-10-CM

## 2020-10-05 DIAGNOSIS — Z99.81 CHRONIC RESPIRATORY FAILURE WITH HYPOXIA, ON HOME OXYGEN THERAPY (HCC): Primary | ICD-10-CM

## 2020-10-05 DIAGNOSIS — I82.5Y2 CHRONIC DEEP VEIN THROMBOSIS (DVT) OF PROXIMAL VEIN OF LEFT LOWER EXTREMITY (HCC): ICD-10-CM

## 2020-10-05 LAB — INR PPP: 1.1 (ref 0.9–1.1)

## 2020-10-05 PROCEDURE — 99214 OFFICE O/P EST MOD 30 MIN: CPT | Performed by: INTERNAL MEDICINE

## 2020-10-05 PROCEDURE — 85610 PROTHROMBIN TIME: CPT | Performed by: INTERNAL MEDICINE

## 2020-10-05 PROCEDURE — 36416 COLLJ CAPILLARY BLOOD SPEC: CPT | Performed by: INTERNAL MEDICINE

## 2020-10-05 RX ORDER — AMOXICILLIN 875 MG/1
875 TABLET, COATED ORAL 2 TIMES DAILY
Qty: 20 TABLET | Refills: 0 | Status: SHIPPED | OUTPATIENT
Start: 2020-10-05 | End: 2020-10-15

## 2020-10-05 RX ORDER — AMOXICILLIN 875 MG/1
875 TABLET, COATED ORAL
COMMUNITY
Start: 2020-06-29 | End: 2020-11-05

## 2020-10-05 NOTE — PROGRESS NOTES
Subjective   Narciso Romano is a 74 y.o. male.  Patient with COPD as well as chronic CHF is requiring home O2 continuously at 2 L needs renewal of this.  Body mass index is 22.05 kg/m².  History of Present Illness   Patient overall requires continuous home O2 at 2 L does at almost all the time does breathe easier with this clearly improved exercise tolerance with this as well.  Is able to sleep better at night also does have known CHF as well as COPD.  Quality of life is definitely improved with nasal O2 at 2 L continuously follow-up on blood pressure he is known CRD which actually improved from CRD stage IV to CRD stage III.  No longer requiring dialysis.  Lab patient continue to monitor blood pressure without adjusting medicines we got 140/76 today having some sinus pressure and drainage which she has recurrently we will treat with amoxicillin pressure in his face more so maxillary sinuses but slightly in the frontal sinuses well no temperature with this  Dr rick cardiologist need to check see if cialis  Ok?  With cardiology.  Drug allergies Cipro causing hives Keflex hives lisinopril hives Norvasc hives Levoxyl undefined Tiazac undefined.  Former smoker family is positive for heart disease hypertension tremor disease lung disease kidney disease  Review of Systems   Constitutional: Negative.    HENT: Negative.    Eyes: Negative.    Respiratory: Negative.    Cardiovascular: Negative.    Gastrointestinal: Negative.    Endocrine: Negative.    Genitourinary: Negative.    Musculoskeletal: Negative.    Skin: Negative.    Allergic/Immunologic: Positive for environmental allergies, food allergies and immunocompromised state.   Neurological: Negative.    Hematological: Negative.    Psychiatric/Behavioral: Negative.        Objective   Vitals:    10/05/20 0819   BP: 148/76   Pulse: 80   Temp: 96.9 °F (36.1 °C)   SpO2: 90%   Weight: 73.8 kg (162 lb 9.6 oz)     Physical Exam  Constitutional:       Appearance: Normal  appearance.   HENT:      Head: Normocephalic and atraumatic.      Comments: Pressure more so over the maxillary sinuses.  Eyes:      Conjunctiva/sclera: Conjunctivae normal.      Pupils: Pupils are equal, round, and reactive to light.   Cardiovascular:      Rate and Rhythm: Normal rate and regular rhythm.      Heart sounds: Normal heart sounds.   Pulmonary:      Effort: Pulmonary effort is normal.      Comments: Patient with decreased breath sounds but no rales or wheezes heard bilaterally.  Abdominal:      General: Abdomen is flat. Bowel sounds are normal.      Palpations: Abdomen is soft.   Skin:     General: Skin is warm and dry.   Neurological:      General: No focal deficit present.      Mental Status: He is alert.      Comments: Somewhat slow but stable gait and station         Lab Results   Component Value Date    INR 2.50 (A) 05/12/2020    INR 2.40 (H) 04/14/2020    INR 2.30 (A) 03/17/2020       Procedures    Assessment/Plan   1.  Chronic respiratory failure with hypoxia on continuous home oxygen plan will refill home O2 1 years duration patient uses [facility]    2.  CHF chronic    3.  Acute recurrent maxillary sinusitis plan amoxicillin 875 twice daily for 10 days time follow-up call if not well on today's time    4.  Hypertension slightly elevated will have patient continue to monitor blood pressure is now but is trending above 140/90 we will have patient follow-up in 2 months time and as needed    5.  Chronic DVT on Eliquis currently    Much of this encounter note is an electronic transcription/translation of spoken language to printed text.  The electronic translation of spoken language may permit erroneous, or at times, nonsensical words or phrases to be inadvertently transcribed.  Although I have reviewed the note for such errors, some may still exist. If there are questions or for further clarification, please contact me.  There are no diagnoses linked to this encounter.

## 2020-11-04 ENCOUNTER — TELEPHONE (OUTPATIENT)
Dept: FAMILY MEDICINE CLINIC | Facility: CLINIC | Age: 75
End: 2020-11-04

## 2020-11-04 NOTE — TELEPHONE ENCOUNTER
PT IS CALLING IN STATING THAT HE HAD A COLONOSCOPY ON Monday.  PT SAID IT WAS COLD ON Monday AND HE HAS A COUGH, FEVER, CHILLS AND WANTS TO KNOW IF SOMETHING CAN BE CALLED IN FOR HIM.      PT CALL BACK  [phone]  PHARMACY  [address]  [phone]

## 2020-11-04 NOTE — TELEPHONE ENCOUNTER
Patient states he has not been going out he thinks he caught a cold while it was so cold on Monday when he went to colonoscopy. He's running high 90's temp, coughing up greenish stuff,sore throat and in bed resting but wants an antibotitic so he doesn't get worse . Told him we would call back if problem

## 2020-11-05 RX ORDER — AMOXICILLIN 875 MG/1
TABLET, COATED ORAL
Qty: 20 TABLET | Refills: 0 | Status: SHIPPED | OUTPATIENT
Start: 2020-11-05 | End: 2020-01-01

## 2020-11-05 NOTE — TELEPHONE ENCOUNTER
Staff had reached him earlier he did seem appropriate just temperatures running like 99 point something no temperature with this just try to be proactive with the sinuses long so he does get worse to have to go to the hospital.  But he did seem oriented.  We did call earlier today from surgeon's office who felt that he was not as sharp as he should be and I had concerns as they would not prescribe antibiotics without a clear-cut reason without seeing the patient so given the contact our staff with him we will proceed with his amoxicillin with usual precaution if he does get worse he should go and go to the ER.  Given his history of respiratory issues including pneumonia

## 2020-12-01 NOTE — TELEPHONE ENCOUNTER
Pt informed of results  Ionized calcium couldn't be added pt will need to come in- he will come in Thursday, he cannot come in before  No increased sleepiness or confusion- he will go to ER if symptomatic

## 2020-12-03 NOTE — PROGRESS NOTES
Subjective   Narciso Romano is a 75 y.o. male.  Patient here for follow-up on elevated calcium does need repeat lab work recheck blood pressures have recurrent gout attack cannot afford Colcrys lately it does not seem to be as efficacious for his gout he does have significant CRD stage III fluctuating during stage III and stage IV  Body mass index is 20.45 kg/m².  History of Present Illness   As above recurrent pain in joints secondary gout difficulty affording the Colcrys as not as effective as it once was blood pressure is elevated today he does see nephrology tomorrow likely his medicines consideration or be increased dose nifedipine from 90 to 120 mg daily with continue other medicines as is all but nephrology assess this problem.  He is aware when he takes his Cialis he is not to take his Flomax or Isordil or isosorbide on those days he states he has discussed it with his cardiologist who is comfortable that.  We do drug contract for hydrocodone to make do when he does have his got attacks.  Urinary limitation medicines ionized calcium today for his elevated calcium level  Drug allergies Cipro causing hives Keflex hives lisinopril hives Norvasc hives Levoxyl and Tiazac.  Former smoker.  Family history is positive for heart disease hypertension thyroid disease lung disease kidney disease  Review of Systems   Constitutional: Negative.    HENT: Negative.    Eyes: Negative.    Respiratory: Negative.    Cardiovascular: Negative.    Gastrointestinal: Negative.    Endocrine: Negative.    Genitourinary: Negative.    Musculoskeletal: Negative.    Skin: Negative.    Allergic/Immunologic: Positive for environmental allergies, food allergies and immunocompromised state.   Hematological: Negative.    Psychiatric/Behavioral: Negative.        Objective   Vitals:    12/03/20 0814   BP: 158/76   Pulse: 71   Temp: 96.9 °F (36.1 °C)   SpO2: 92%   Weight: 68.4 kg (150 lb 12.8 oz)     Physical Exam  Vitals signs and nursing note  reviewed.   Constitutional:       Appearance: Normal appearance.   HENT:      Head: Normocephalic and atraumatic.   Eyes:      Conjunctiva/sclera: Conjunctivae normal.      Pupils: Pupils are equal, round, and reactive to light.   Cardiovascular:      Rate and Rhythm: Normal rate and regular rhythm.      Heart sounds: Normal heart sounds.   Pulmonary:      Effort: Pulmonary effort is normal.      Breath sounds: Normal breath sounds.   Abdominal:      General: Abdomen is flat. Bowel sounds are normal.      Palpations: Abdomen is soft.   Skin:     General: Skin is warm and dry.   Neurological:      Mental Status: He is alert.         Lab Results   Component Value Date    INR 1.10 10/05/2020    INR 2.50 (A) 05/12/2020    INR 2.40 (H) 04/14/2020       Procedures    Assessment/Plan 1.  Elevated calcium plan get ionized calcium level    2.  Gout recurrent plan hydrocodone to take as needed patient discuss Colcrys versus other with nephrologist with his visit in tomorrow    3.  Medication monitoring is to be UDS per contract for his hydrocodone for gout    4.  Hypertension increased will defer to nephrology who he sees tomorrow but a possible consideration would be increase his nifedipine 90-1 20 will defer to nephrology    5.  CRD stage III he does fluctuate between stage III and stage IV continue present medicine    6.  Allergic rhinitis Astelin nasal spray he is to discuss other allergy medicines with his nephrologist tomorrow does not have a history of glaucoma per his history.    7.  Elevated calcium await ionized calcium  Urine drug screen drug contract for hydrocodone for gout.    Much of this encounter note is an electronic transcription/translation of spoken language to printed text.  The electronic translation of spoken language may permit erroneous, or at times, nonsensical words or phrases to be inadvertently transcribed.  Although I have reviewed the note for such errors, some may still exist. If there are  questions or for further clarification, please contact me.  There are no diagnoses linked to this encounter.

## 2020-12-04 NOTE — TELEPHONE ENCOUNTER
Left message for patient to call back, we need to see if he as seen an endo in past, if not we will refer him to one now.

## 2020-12-23 NOTE — TELEPHONE ENCOUNTER
MATHEW FROM [facility] CALLED AND NEEDS THE OXYGEN ORDER FAXED TO [facility]. FROM 11/24/20      FAX NUMBER [phone]       ATTN: MATHEW

## 2020-12-28 NOTE — TELEPHONE ENCOUNTER
Patient does have a pulmonary couldn't remember and informed we sent records but if not honored will have to get pulmonary to do. Also informed re: [facility] retiring

## 2020-12-28 NOTE — TELEPHONE ENCOUNTER
Patient gave me a F# [phone] to send to [facility] for pulmonary consult then he wants me to call him back with name

## 2020-12-28 NOTE — TELEPHONE ENCOUNTER
PATIENT CALLED REQUESTING A CALL BACK FROM RICHAR SOLO. IT'S REGARDING THE PULMONARY DOCTOR.    PLEASE ADVISE  [phone]

## 2021-01-01 ENCOUNTER — TELEPHONE (OUTPATIENT)
Dept: FAMILY MEDICINE CLINIC | Facility: CLINIC | Age: 76
End: 2021-01-01

## 2021-01-01 ENCOUNTER — TELEPHONE (OUTPATIENT)
Dept: PEDIATRICS | Facility: OTHER | Age: 76
End: 2021-01-01

## 2021-01-01 ENCOUNTER — OFFICE VISIT (OUTPATIENT)
Dept: FAMILY MEDICINE CLINIC | Facility: CLINIC | Age: 76
End: 2021-01-01

## 2021-01-01 ENCOUNTER — DOCUMENTATION (OUTPATIENT)
Dept: FAMILY MEDICINE CLINIC | Facility: CLINIC | Age: 76
End: 2021-01-01

## 2021-01-01 VITALS
HEIGHT: 72 IN | TEMPERATURE: 98.4 F | WEIGHT: 130.2 LBS | DIASTOLIC BLOOD PRESSURE: 64 MMHG | HEART RATE: 71 BPM | OXYGEN SATURATION: 93 % | BODY MASS INDEX: 17.64 KG/M2 | SYSTOLIC BLOOD PRESSURE: 130 MMHG

## 2021-01-01 VITALS
BODY MASS INDEX: 18.88 KG/M2 | SYSTOLIC BLOOD PRESSURE: 120 MMHG | HEART RATE: 73 BPM | OXYGEN SATURATION: 91 % | WEIGHT: 139.4 LBS | DIASTOLIC BLOOD PRESSURE: 66 MMHG | TEMPERATURE: 97.5 F | HEIGHT: 72 IN

## 2021-01-01 VITALS
SYSTOLIC BLOOD PRESSURE: 140 MMHG | OXYGEN SATURATION: 88 % | TEMPERATURE: 96.9 F | DIASTOLIC BLOOD PRESSURE: 62 MMHG | HEART RATE: 76 BPM | RESPIRATION RATE: 15 BRPM | BODY MASS INDEX: 18.56 KG/M2 | HEIGHT: 72 IN | WEIGHT: 137 LBS

## 2021-01-01 DIAGNOSIS — Z23 IMMUNIZATION DUE: ICD-10-CM

## 2021-01-01 DIAGNOSIS — I21.01 MYOCARDIAL INFARCTION INVOLVING LEFT MAIN CORONARY ARTERY, UNSPECIFIED MI TYPE (HCC): ICD-10-CM

## 2021-01-01 DIAGNOSIS — I50.40 COMBINED SYSTOLIC AND DIASTOLIC CONGESTIVE HEART FAILURE, UNSPECIFIED HF CHRONICITY (HCC): ICD-10-CM

## 2021-01-01 DIAGNOSIS — M25.561 CHRONIC PAIN OF BOTH KNEES: Primary | ICD-10-CM

## 2021-01-01 DIAGNOSIS — G89.29 CHRONIC PAIN OF BOTH KNEES: Primary | ICD-10-CM

## 2021-01-01 DIAGNOSIS — Z00.00 MEDICARE ANNUAL WELLNESS VISIT, SUBSEQUENT: Primary | ICD-10-CM

## 2021-01-01 DIAGNOSIS — J42 CHRONIC BRONCHITIS, UNSPECIFIED CHRONIC BRONCHITIS TYPE (HCC): ICD-10-CM

## 2021-01-01 DIAGNOSIS — J44.9 CHRONIC OBSTRUCTIVE PULMONARY DISEASE, UNSPECIFIED COPD TYPE (HCC): ICD-10-CM

## 2021-01-01 DIAGNOSIS — I10 ESSENTIAL HYPERTENSION: ICD-10-CM

## 2021-01-01 DIAGNOSIS — Z99.2 DIALYSIS PATIENT (HCC): ICD-10-CM

## 2021-01-01 DIAGNOSIS — M25.562 CHRONIC PAIN OF BOTH KNEES: Primary | ICD-10-CM

## 2021-01-01 DIAGNOSIS — R06.02 SOB (SHORTNESS OF BREATH): Primary | ICD-10-CM

## 2021-01-01 DIAGNOSIS — E03.9 HYPOTHYROIDISM, UNSPECIFIED TYPE: ICD-10-CM

## 2021-01-01 DIAGNOSIS — I82.403 DEEP VEIN THROMBOSIS (DVT) OF BOTH LOWER EXTREMITIES, UNSPECIFIED CHRONICITY, UNSPECIFIED VEIN (HCC): ICD-10-CM

## 2021-01-01 DIAGNOSIS — K21.00 GASTROESOPHAGEAL REFLUX DISEASE WITH ESOPHAGITIS WITHOUT HEMORRHAGE: ICD-10-CM

## 2021-01-01 DIAGNOSIS — J44.9 CHRONIC OBSTRUCTIVE PULMONARY DISEASE, UNSPECIFIED COPD TYPE (HCC): Primary | ICD-10-CM

## 2021-01-01 LAB
ALBUMIN SERPL-MCNC: 4 G/DL (ref 3.5–5.2)
ALBUMIN/GLOB SERPL: 1.4 G/DL
ALP SERPL-CCNC: 81 U/L (ref 39–117)
ALT SERPL W P-5'-P-CCNC: 10 U/L (ref 1–41)
ANION GAP SERPL CALCULATED.3IONS-SCNC: 10.1 MMOL/L (ref 5–15)
AST SERPL-CCNC: 20 U/L (ref 1–40)
BILIRUB SERPL-MCNC: 0.7 MG/DL (ref 0–1.2)
BUN SERPL-MCNC: 40 MG/DL (ref 8–23)
BUN/CREAT SERPL: 16.3 (ref 7–25)
CALCIUM SPEC-SCNC: 10.2 MG/DL (ref 8.6–10.5)
CHLORIDE SERPL-SCNC: 109 MMOL/L (ref 98–107)
CO2 SERPL-SCNC: 18.9 MMOL/L (ref 22–29)
CREAT SERPL-MCNC: 2.45 MG/DL (ref 0.76–1.27)
ERYTHROCYTE [DISTWIDTH] IN BLOOD BY AUTOMATED COUNT: 22.1 % (ref 12.3–15.4)
GFR SERPL CREATININE-BSD FRML MDRD: 31 ML/MIN/1.73
GLOBULIN UR ELPH-MCNC: 2.8 GM/DL
GLUCOSE SERPL-MCNC: 85 MG/DL (ref 65–99)
HCT VFR BLD AUTO: 36.6 % (ref 37.5–51)
HGB BLD-MCNC: 11.6 G/DL (ref 13–17.7)
LYMPHOCYTES # BLD AUTO: 1.1 10*3/MM3 (ref 0.7–3.1)
LYMPHOCYTES NFR BLD AUTO: 15.4 % (ref 19.6–45.3)
MCH RBC QN AUTO: 27.9 PG (ref 26.6–33)
MCHC RBC AUTO-ENTMCNC: 31.7 G/DL (ref 31.5–35.7)
MCV RBC AUTO: 88.2 FL (ref 79–97)
MONOCYTES # BLD AUTO: 0.2 10*3/MM3 (ref 0.1–0.9)
MONOCYTES NFR BLD AUTO: 3.1 % (ref 5–12)
NEUTROPHILS NFR BLD AUTO: 5.9 10*3/MM3 (ref 1.7–7)
NEUTROPHILS NFR BLD AUTO: 81.5 % (ref 42.7–76)
NT-PROBNP SERPL-MCNC: ABNORMAL PG/ML (ref 0–900)
PLATELET # BLD AUTO: 211 10*3/MM3 (ref 140–450)
PMV BLD AUTO: 7.8 FL (ref 6–12)
POTASSIUM SERPL-SCNC: 6.2 MMOL/L (ref 3.5–5.2)
PROT SERPL-MCNC: 6.8 G/DL (ref 6–8.5)
RBC # BLD AUTO: 4.15 10*6/MM3 (ref 4.14–5.8)
SODIUM SERPL-SCNC: 138 MMOL/L (ref 136–145)
T-UPTAKE NFR SERPL: 0.94 TBI (ref 0.8–1.3)
T4 SERPL-MCNC: 7.41 MCG/DL (ref 4.5–11.7)
TSH SERPL DL<=0.05 MIU/L-ACNC: 8.05 UIU/ML (ref 0.27–4.2)
WBC # BLD AUTO: 7.3 10*3/MM3 (ref 3.4–10.8)

## 2021-01-01 PROCEDURE — 93000 ELECTROCARDIOGRAM COMPLETE: CPT | Performed by: INTERNAL MEDICINE

## 2021-01-01 PROCEDURE — 84436 ASSAY OF TOTAL THYROXINE: CPT | Performed by: INTERNAL MEDICINE

## 2021-01-01 PROCEDURE — 84479 ASSAY OF THYROID (T3 OR T4): CPT | Performed by: INTERNAL MEDICINE

## 2021-01-01 PROCEDURE — 83880 ASSAY OF NATRIURETIC PEPTIDE: CPT | Performed by: INTERNAL MEDICINE

## 2021-01-01 PROCEDURE — 85025 COMPLETE CBC W/AUTO DIFF WBC: CPT | Performed by: INTERNAL MEDICINE

## 2021-01-01 PROCEDURE — G0439 PPPS, SUBSEQ VISIT: HCPCS | Performed by: INTERNAL MEDICINE

## 2021-01-01 PROCEDURE — 99214 OFFICE O/P EST MOD 30 MIN: CPT | Performed by: INTERNAL MEDICINE

## 2021-01-01 PROCEDURE — 36415 COLL VENOUS BLD VENIPUNCTURE: CPT | Performed by: INTERNAL MEDICINE

## 2021-01-01 PROCEDURE — 1159F MED LIST DOCD IN RCRD: CPT | Performed by: INTERNAL MEDICINE

## 2021-01-01 PROCEDURE — 1170F FXNL STATUS ASSESSED: CPT | Performed by: INTERNAL MEDICINE

## 2021-01-01 PROCEDURE — 99212 OFFICE O/P EST SF 10 MIN: CPT | Performed by: INTERNAL MEDICINE

## 2021-01-01 PROCEDURE — 80053 COMPREHEN METABOLIC PANEL: CPT | Performed by: INTERNAL MEDICINE

## 2021-01-01 PROCEDURE — 96160 PT-FOCUSED HLTH RISK ASSMT: CPT | Performed by: INTERNAL MEDICINE

## 2021-01-01 PROCEDURE — 84443 ASSAY THYROID STIM HORMONE: CPT | Performed by: INTERNAL MEDICINE

## 2021-01-01 RX ORDER — AZELASTINE 1 MG/ML
2 SPRAY, METERED NASAL 2 TIMES DAILY
Qty: 1 EACH | Refills: 12 | Status: SHIPPED | OUTPATIENT
Start: 2021-01-01 | End: 2021-01-01

## 2021-01-01 RX ORDER — NAPROXEN SODIUM 220 MG
220 TABLET ORAL 2 TIMES DAILY PRN
Qty: 60 TABLET | Refills: 3 | Status: SHIPPED | OUTPATIENT
Start: 2021-01-01 | End: 2021-01-01 | Stop reason: ALTCHOICE

## 2021-01-01 RX ORDER — IPRATROPIUM BROMIDE AND ALBUTEROL SULFATE 2.5; .5 MG/3ML; MG/3ML
3 SOLUTION RESPIRATORY (INHALATION) EVERY 4 HOURS PRN
Qty: 120 ML | Refills: 1 | Status: SHIPPED | OUTPATIENT
Start: 2021-01-01

## 2021-01-01 RX ORDER — ASPIRIN 81 MG/1
81 TABLET ORAL DAILY
COMMUNITY

## 2021-01-01 RX ORDER — ISOSORBIDE DINITRATE 20 MG/1
20 TABLET ORAL 3 TIMES DAILY
Qty: 90 TABLET | Refills: 3 | Status: SHIPPED | OUTPATIENT
Start: 2021-01-01 | End: 2021-01-01 | Stop reason: SDUPTHER

## 2021-01-01 RX ORDER — CARVEDILOL 12.5 MG/1
12.5 TABLET ORAL 2 TIMES DAILY WITH MEALS
COMMUNITY
End: 2021-01-01 | Stop reason: SDUPTHER

## 2021-01-01 RX ORDER — ALBUTEROL SULFATE 90 UG/1
2 AEROSOL, METERED RESPIRATORY (INHALATION) EVERY 4 HOURS PRN
Qty: 18 G | Refills: 3 | Status: SHIPPED | OUTPATIENT
Start: 2021-01-01

## 2021-01-01 RX ORDER — METOPROLOL SUCCINATE 25 MG/1
TABLET, EXTENDED RELEASE ORAL
Qty: 60 TABLET | Refills: 4 | Status: SHIPPED | OUTPATIENT
Start: 2021-01-01 | End: 2021-01-01 | Stop reason: SDUPTHER

## 2021-01-01 RX ORDER — CIPROFLOXACIN 500 MG/1
500 TABLET, FILM COATED ORAL 2 TIMES DAILY
Qty: 28 TABLET | Refills: 0 | Status: SHIPPED | OUTPATIENT
Start: 2021-01-01 | End: 2021-01-01

## 2021-01-01 RX ORDER — ISOSORBIDE DINITRATE 20 MG/1
20 TABLET ORAL 3 TIMES DAILY
Qty: 90 TABLET | Refills: 3 | Status: SHIPPED | OUTPATIENT
Start: 2021-01-01 | End: 2021-01-01 | Stop reason: ALTCHOICE

## 2021-01-01 RX ORDER — AZELASTINE 1 MG/ML
SPRAY, METERED NASAL
Qty: 1 EACH | Refills: 12 | Status: SHIPPED | OUTPATIENT
Start: 2021-01-01

## 2021-01-01 RX ORDER — METOPROLOL SUCCINATE 25 MG/1
25 TABLET, EXTENDED RELEASE ORAL 2 TIMES DAILY
Qty: 60 TABLET | Refills: 0 | Status: SHIPPED | OUTPATIENT
Start: 2021-01-01 | End: 2021-01-01

## 2021-01-01 RX ORDER — AZELASTINE 1 MG/ML
SPRAY, METERED NASAL
Qty: 1 EACH | Refills: 12 | Status: CANCELLED | OUTPATIENT
Start: 2021-01-01

## 2021-01-01 RX ORDER — APIXABAN 2.5 MG/1
2.5 TABLET, FILM COATED ORAL 2 TIMES DAILY
Qty: 60 TABLET | Refills: 5 | Status: SHIPPED | OUTPATIENT
Start: 2021-01-01 | End: 2021-01-01 | Stop reason: SDUPTHER

## 2021-01-01 RX ORDER — METOPROLOL SUCCINATE 25 MG/1
25 TABLET, EXTENDED RELEASE ORAL 2 TIMES DAILY
Qty: 60 TABLET | Refills: 4 | Status: SHIPPED | OUTPATIENT
Start: 2021-01-01

## 2021-01-01 RX ORDER — ISOSORBIDE MONONITRATE 30 MG/1
30 TABLET, EXTENDED RELEASE ORAL DAILY
Qty: 90 TABLET | Refills: 1 | Status: SHIPPED | OUTPATIENT
Start: 2021-01-01

## 2021-01-01 RX ORDER — CARVEDILOL 12.5 MG/1
12.5 TABLET ORAL 2 TIMES DAILY WITH MEALS
Qty: 180 TABLET | Refills: 1 | Status: SHIPPED | OUTPATIENT
Start: 2021-01-01

## 2021-01-01 RX ORDER — LEVOTHYROXINE SODIUM 25 UG/1
100 CAPSULE ORAL DAILY
Qty: 90 CAPSULE | Refills: 4 | Status: SHIPPED | OUTPATIENT
Start: 2021-01-01

## 2021-01-01 RX ORDER — HYDRALAZINE HYDROCHLORIDE 100 MG/1
100 TABLET, FILM COATED ORAL 3 TIMES DAILY
Qty: 270 TABLET | Refills: 3 | Status: SHIPPED | OUTPATIENT
Start: 2021-01-01 | End: 2021-01-01 | Stop reason: SDUPTHER

## 2021-01-01 RX ORDER — HYDRALAZINE HYDROCHLORIDE 100 MG/1
100 TABLET, FILM COATED ORAL 3 TIMES DAILY
Qty: 270 TABLET | Refills: 3 | Status: SHIPPED | OUTPATIENT
Start: 2021-01-01 | End: 2021-01-01 | Stop reason: ALTCHOICE

## 2021-01-01 RX ORDER — NITROGLYCERIN 0.4 MG/1
TABLET SUBLINGUAL
Qty: 100 TABLET | Refills: 3 | Status: SHIPPED | OUTPATIENT
Start: 2021-01-01 | End: 2021-01-01 | Stop reason: SDUPTHER

## 2021-01-01 RX ORDER — NITROGLYCERIN 0.4 MG/1
0.4 TABLET SUBLINGUAL
Qty: 100 TABLET | Refills: 3 | Status: SHIPPED | OUTPATIENT
Start: 2021-01-01

## 2021-01-01 RX ORDER — TADALAFIL 10 MG/1
10 TABLET ORAL
COMMUNITY
Start: 2021-01-01 | End: 2021-01-01

## 2021-01-01 RX ORDER — PSEUDOEPHEDRINE HCL 30 MG
100 TABLET ORAL
COMMUNITY
Start: 2021-01-01

## 2021-01-01 RX ORDER — ACYCLOVIR 800 MG/1
800 TABLET ORAL
Qty: 35 TABLET | Refills: 2 | Status: SHIPPED | OUTPATIENT
Start: 2021-01-01

## 2021-01-01 RX ORDER — NITROGLYCERIN 0.4 MG/1
0.4 TABLET SUBLINGUAL
Qty: 25 TABLET | Refills: 3 | Status: SHIPPED | OUTPATIENT
Start: 2021-01-01 | End: 2021-01-01

## 2021-01-01 RX ORDER — APIXABAN 2.5 MG/1
2.5 TABLET, FILM COATED ORAL 2 TIMES DAILY
Qty: 60 TABLET | Refills: 5 | Status: SHIPPED | OUTPATIENT
Start: 2021-01-01

## 2021-01-15 NOTE — TELEPHONE ENCOUNTER
PTS WIFE NICOLETTE IS CALLING IN STATING THAT PT WAS RELEASED FROM THE HOSPITAL ON 01/13/2021 AND WANTS TO SEE DR AMADOR AS HIS NEW PCP SINCE DR DAY HAS RETIRED.  THE NEXT AVALIBLE APPOINTMENT THAT I SHOW FOR A NEW PT IS IN MARCH.  NICOLETTE WANTS TO BE CALLED BACK TO DISCUSS PTS HEALTH AND TRY TO SCHEDULE AN APPOINTMENT SOONER.    NICOLETTE CALL BACK  [phone]

## 2021-01-18 NOTE — TELEPHONE ENCOUNTER
CAN SOMEONE CALL HER BACK , SHE WANTS TO DISCUSS HIS HEALTH ISSUES. WE CAN GET HIM IN WITH DANA VIJAYA SOMETIME IF THEY WANT US TO SCHEDULE IT.

## 2021-01-19 NOTE — TELEPHONE ENCOUNTER
[facility] IS CALLING ON BEHALF OF THE PATIENT ABOUT A PRE AUTH FOR A MEDICATION. THEY ARE NEEDING A CALL BACK.    GOOD CALL BACK    [phone]

## 2021-01-19 NOTE — TELEPHONE ENCOUNTER
REUBEN WITH [employer] WANTS TO VERIFY THAT THE ORDER TO CHANGE THE TIERS ON PATIENTS ELIQUIS 2.5 MG tablet tablet WAS RECEIVED.    REUBEN CAN BE REACHED [phone] TO CLARIFY AND CONFIRM.

## 2021-01-22 NOTE — PROGRESS NOTES
Subjective   Narciso Romano is a 75 y.o. male.     Vitals:    01/22/21 1614   BP: 130/64   Pulse: 71   Temp: 98.4 °F (36.9 °C)   SpO2: 93%      Body mass index is 17.66 kg/m².     History of Present Illness   Patient was seen for chronic knee pain.  Patient was seen orthopedic surgeon and has severe degenerative changes.  Patient is on Eliquis because he has DVTs.  60s.  Patient is already on hydrocodone and has partial relief.  Patient was advised to ask ask his orthopedic surgeon for knee injections.  Patient will follow up in another month.    Dictated utilizing Dragon dictation. If there are questions or for further clarification, please contact me.  The following portions of the patient's history were reviewed and updated as appropriate: allergies, current medications, past family history, past medical history, past social history, past surgical history and problem list.    Review of Systems   Constitutional: Negative for fatigue and fever.   HENT: Positive for congestion. Negative for trouble swallowing.    Eyes: Negative for discharge and visual disturbance.   Respiratory: Negative for choking and shortness of breath.    Cardiovascular: Negative for chest pain and palpitations.   Gastrointestinal: Negative for abdominal pain and blood in stool.   Endocrine: Negative.    Genitourinary: Negative for genital sores and hematuria.   Musculoskeletal: Negative for gait problem and joint swelling.        Knee pain   Skin: Negative for color change, pallor, rash and wound.   Allergic/Immunologic: Positive for environmental allergies. Negative for immunocompromised state.   Neurological: Negative for facial asymmetry and speech difficulty.   Psychiatric/Behavioral: Negative for hallucinations and suicidal ideas.       Objective   Physical Exam  Vitals signs and nursing note reviewed.   Constitutional:       Appearance: Normal appearance. He is well-developed.   HENT:      Head: Normocephalic and atraumatic.       Nose: Nose normal.      Mouth/Throat:      Mouth: Mucous membranes are moist.      Pharynx: Oropharynx is clear.   Eyes:      Extraocular Movements: Extraocular movements intact.      Conjunctiva/sclera: Conjunctivae normal.      Pupils: Pupils are equal, round, and reactive to light.   Neck:      Musculoskeletal: Normal range of motion and neck supple.   Cardiovascular:      Rate and Rhythm: Normal rate and regular rhythm.      Heart sounds: Normal heart sounds.   Pulmonary:      Effort: Pulmonary effort is normal.      Breath sounds: Normal breath sounds.   Abdominal:      General: Bowel sounds are normal.      Palpations: Abdomen is soft.   Musculoskeletal: Normal range of motion.         General: Swelling and tenderness present.   Skin:     General: Skin is warm and dry.   Neurological:      General: No focal deficit present.      Mental Status: He is alert and oriented to person, place, and time. Mental status is at baseline.   Psychiatric:         Mood and Affect: Mood normal.         Behavior: Behavior normal.         Thought Content: Thought content normal.         Judgment: Judgment normal.         Assessment/Plan #1 knee injection #2 continue present antibiotic  Problems Addressed this Visit     Cardiac and Vasculature              Essential hypertension          Coag and Thromboembolic              DVT (deep venous thrombosis) (CMS/[facility])          Gastrointestinal Abdominal               GERD (gastroesophageal reflux disease)            Other Visit Diagnoses     Chronic pain of both knees    -  Primary      Diagnoses     Diagnosis Codes Comments    Chronic pain of both knees    -  Primary ICD-10-CM: M25.561, M25.562, G89.29  ICD-9-CM: 719.46, 338.29     Gastroesophageal reflux disease with esophagitis without hemorrhage     ICD-10-CM: K21.00  ICD-9-CM: 530.81, 530.10     Essential hypertension     ICD-10-CM: I10  ICD-9-CM: 401.9     Deep vein thrombosis (DVT) of both lower extremities, unspecified  chronicity, unspecified vein (CMS/HCC)     ICD-10-CM: I82.403  ICD-9-CM: 453.40

## 2021-01-25 NOTE — TELEPHONE ENCOUNTER
Caller: NICHO PATRICIO    Relationship to patient:     Best call back number: [phone]    Patient is needing: NICHO FROM [facility] CALLING IN REGARDS TO PATIENT BEING RELEASED FROM HOSPITAL NURSE IS REQUESTING INFORMATION ON THE STAGE OF PATIENTS CHRONIC KIDNEY DISEASE    PLEASE ADVISE     REQUESTING CALLBACK

## 2021-02-03 NOTE — TELEPHONE ENCOUNTER
KATHY WITH [facility] CALLED AND STATES [facility] HAS BEEN FOLLOWING HIM FOR HEART FAILURE MANAGEMENT. HE IS HAVING SOME SWELLING IN BOTH FEET AND A LITTLE SHORT OF BREATH.      HE HAS BEEN GAINING WEIGHT ABOUT A POUND A WEEK. HE GAINED 4 POUNDS LAST WEEK.    PLEASE CALL KATHY [phone]

## 2021-03-18 NOTE — TELEPHONE ENCOUNTER
Caller: Narciso Romano    Relationship to patient: Self    Best call back number: [phone]    Patient is needing: PATIENT CALLED STATING THAT DR. HERRON GAVE HIM ELEQUIS SAMPLES AND ADVISED HIM TO CALL ONCE LOW. PATIENT STATES HIS SAMPLE SUPPLY IS LOW AND IS REQUESTING A CALL BACK TO DISCUSS WHEN HE CAN  MORE SAMPLES FROM PRACTICE. PATIENT STATES THAT IF HE DOES NOT ANSWER HIS PHONE, IT IS OKAY TO LEAVE VOICEMAIL

## 2021-03-24 NOTE — TELEPHONE ENCOUNTER
Caller: Juan Antonio Narciso    Relationship: Self    Best call back number: [phone]    Medication needed:   ALBUTEROL SOLUTION FOR THE NEBULIZER. BROMIDE/ALBUTEROL SULFATE .5MG    When do you need the refill by: ASAP    What additional details did the patient provide when requesting the medication: PATIENT STATED HE WAS PRESCRIBED THIS IN THE HOSPITAL    Does the patient have less than a 3 day supply:  [x] Yes  [] No    What is the patient's preferred pharmacy: [facility]/PH[facility]ACY #[id number] - [address] AT [facility] [phone] [facility] [phone]

## 2021-03-29 NOTE — TELEPHONE ENCOUNTER
Caller: Narciso Romano    Relationship: Self    Best call back number: [phone]    Medication needed:   Requested Prescriptions     Pending Prescriptions Disp Refills   • albuterol sulfate  (90 Base) MCG/ACT inhaler       Sig: Inhale 2 puffs Every 4 (Four) Hours As Needed for Wheezing.       When do you need the refill by: ASAP    What additional details did the patient provide when requesting the medication: THE PATIENT IS CALLING BACK TO STATE THAT HE IS OUT OF THIS MEDICATION AND IS OUT OF THIS. THE PATIENT WOULD LIKE TO KNOW WHY THIS WASN'T REFILLED AND HE STATED THAT HE WOULD LIKE TO KNOW WHY DR. HERRON HASN'T REFILLED THE MEDICATION THAT HELPS HIM BREATHE.   PLEASE UPDATE PATIENT. PATIENT EXPRESSED UNDERSTANDING OF OFFICE MOVING LOCATION AND OFFICE CLOSING.    THE PATIENT IS STILL OUT OF THIS MEDICATION   Does the patient have less than a 3 day supply:  [x] Yes  [] No    What is the patient's preferred pharmacy: [facility]/PHARMACY #[id number] - [place], [place] - [address] [place] [address] [address]. AT [facility] [phone] [facility] [phone] FX

## 2021-03-31 NOTE — TELEPHONE ENCOUNTER
CHRISTIAN FROM [facility] CALL TO REQUEST AND PTINR FINGER STICK AND DOSE ORDER FOR PATIENT.    CHRISTIAN STATED THAT SHE SENT ONE TO [facility] LAST WEEK.  ORDER CAN BE FAXED TO   [phone]  CHRISTIAN CAN BE REACHED [phone]   29-Mar-2018 09:54 Bill For Surgical Tray: no

## 2021-04-02 NOTE — TELEPHONE ENCOUNTER
Caller: Narciso Romano    Relationship: Self    Best call back number: [phone] ([phone])  Medication needed:   Requested Prescriptions      No prescriptions requested or ordered in this encounter   IPRATROPIUM BROMIDE ALBUTEROL SULFATE INHALATION SOLUTION 30 VIALS    When do you need the refill by: ASAP    What additional details did the patient provide when requesting the medication: PATIENT STATES COMPLETELY OUT AND MEDICATION WAS PRESCRIBED BY ER DR    Does the patient have less than a 3 day supply:  [x] Yes  [] No    What is the patient's preferred pharmacy:  PATIENT WOULD LIKE TO COME AND  A HAND WRITTEN PRESCRIPTION    PLEASE ADVISE

## 2021-04-06 NOTE — TELEPHONE ENCOUNTER
PATIENT CALLED STATING HE WOULD LIKE TO SPEAK WITH DR GOPAL BENITEZ'S NURSE ABOUT HIS PAPER PRESCRIPTION.    PLEASE ADVISED AND CALL [phone]

## 2021-04-09 NOTE — TELEPHONE ENCOUNTER
PATIENT CALLED REQUESTING SAMPLES OF ELIQUIS 2.5 MG tablet tablet    PLEASE ADVISE     [phone]

## 2021-04-14 NOTE — TELEPHONE ENCOUNTER
Caller: Keila Romano    Relationship: Emergency Contact    Best call back number: [phone]    What was the call regarding: PATIENT'S EMERGENCY CONTACT CALLED AND STATED PATIENT IS IN HOSPITAL AND SHE WAS SUPPOSE TO  SOME MEDICATION AT THE  AND SHE WILL ATTEMPT TO  MEDICATION TOMORROW OR Monday.

## 2021-05-04 NOTE — TELEPHONE ENCOUNTER
Caller: Narciso Romano    Relationship to patient: Self    Best call back number: [phone]    Patient is needing: PATIENT CALLED REQUESTING SAMPLES OF ELIQUIS AND WOULD LIKE TO  TOMORROW

## 2021-06-01 NOTE — TELEPHONE ENCOUNTER
Caller: Narciso Romano    Relationship: Self    Best call back number: [phone]    Medication needed:   Requested Prescriptions     Pending Prescriptions Disp Refills   • metoprolol succinate XL (TOPROL-XL) 25 MG 24 hr tablet 60 tablet 4     Sig: Take 1 tablet by mouth 2 (Two) Times a Day.       When do you need the refill by: TOMORROW    Does the patient have less than a 3 day supply:  [x] Yes  [] No    What is the patient's preferred pharmacy: [facility]/PHARMACY #[id number] - [address] AT [facility] [phone] [facility] [phone]

## 2021-06-16 NOTE — TELEPHONE ENCOUNTER
Caller: Narciso Romano    Relationship: Self    Best call back number: [phone]    Medication needed:   Requested Prescriptions     Pending Prescriptions Disp Refills   • levothyroxine sodium (TIROSINT) 25 MCG capsule 30 capsule 2     Sig: Take 1 capsule by mouth Daily. Take this amt only!!!!   • hydrALAZINE (APRESOLINE) 100 MG tablet 270 tablet 3     Sig: Take 1 tablet by mouth 3 (Three) Times a Day.   • azelastine (ASTELIN) 0.1 % nasal spray 1 each 12     Si sprays into the nostril(s) as directed by provider 2 (Two) Times a Day. Use in each nostril as directed       When do you need the refill by: 21    What additional details did the patient provide when requesting the medication: PATIENT IS ASKING FOR A WRITTEN PRESCRIPTION FOR azelastine (ASTELIN) 0.1 % nasal spray. PATIENT IS ASKING TO PICK IT UP IN THE OFFICE. PLEASE ADVISE.     Does the patient have less than a 3 day supply:  [x] Yes  [] No    What is the patient's preferred pharmacy: [facility]/PHARMACY #[id number] - [place], [place] - [address] [place] [address] [address]. AT [facility] - [phone] [facility] [phone] FX

## 2021-06-17 NOTE — TELEPHONE ENCOUNTER
Pt needs a written rx for hydrALAZINE and samples of eliquis 5mg when you call patient can you please verify that the hydralazine is the correct pres he needed written to .

## 2021-06-17 NOTE — TELEPHONE ENCOUNTER
Patient wants you to print out astenline nasal spray for shopping around cheaper price  will cancel sent in script  also informed samples up front

## 2021-07-14 NOTE — TELEPHONE ENCOUNTER
Caller: Narciso Romano    Relationship: Self    Best call back number: [phone]     Medication needed:   Requested Prescriptions     Pending Prescriptions Disp Refills   • isosorbide dinitrate (ISORDIL) 20 MG tablet       Sig: Take  by mouth.       When do you need the refill by: 07/14/21      What additional details did the patient provide when requesting the medication: HE STATES THAT HE IS SUPPOSED TO GET A 60 MG OF THIS AND NOT 20 MG AND IT IS CHEAPER AT [facility].    Does the patient have less than a 3 day supply:  [x] Yes  [] No    What is the patient's preferred pharmacy: [address] - [phone] [facility] [phone] FX

## 2021-07-22 NOTE — TELEPHONE ENCOUNTER
Caller: Narciso Romano    Relationship to patient: Self    Best call back number: [phone]    Chief complaint: REQUESTING X-RAYS/LABS/AND WANTS TO DISCUSS HEALTH ISSUES    Type of visit: OFFICE VISIT     Requested date: 7/23 OR 7/26 AFTER 9AM    If rescheduling, when is the original appointment:     Additional notes:

## 2021-07-29 PROBLEM — I50.40 COMBINED SYSTOLIC AND DIASTOLIC CONGESTIVE HEART FAILURE (HCC): Status: ACTIVE | Noted: 2021-01-01

## 2021-07-29 NOTE — PROGRESS NOTES
ECG 12 Lead    Date/Time: 7/29/2021 7:27 PM  Performed by: Osmin Latif MD  Authorized by: Osmin Latif MD   Comparison: not compared with previous ECG   Rhythm: sinus bradycardia  Rate: bradycardic  ST Depression: V3, V4, V5, V6, I, II and aVL  T inversion: V3, V4, V5, V6, I, II and aVL  QRS axis: left    Clinical impression: abnormal EKG  Comments: EKG Interpretation Report    Heart rate:    64 beats/min, TN interval:  192 msec, QRS duration:  100 msec  QTu:416 msec, QTc:  423 msec

## 2021-07-29 NOTE — PROGRESS NOTES
Subjective   Narciso Romano is a 75 y.o. male.     Vitals:    07/29/21 1156   BP: 120/66   Pulse: 73   Temp: 97.5 °F (36.4 °C)   SpO2: 91%      Body mass index is 18.91 kg/m².     History of Present Illness   Patient was seen for shortness of breath.  Patient states she walks less than several feet and is very short of breath.  Patient recently been admitted to the hospital and over diuresed.  Patient was taken off his Bumex.  Patient states that he has gained weight at home.  Patient also has a history of bronchitis has been coughing up green phlegm.  Patient's blood pressure also has been running 120s over 60s.  Patient's thyroids been treated with levothyroxine 100 mcg daily.  Patient has a history of myocardial infarction.  Patient had an EKG which showed ST segment depressions with T wave inversions in the anterior leads.  Patient was very short of breath and was having nondescript chest pain.  Patient was informed he needed to go to the emergency room and wanted to go to [facility].  Patient did not want to take an ambulance and however his daughter took him.    Dictated utilizing Dragon dictation. If there are questions or for further clarification, please contact me.  The following portions of the patient's history were reviewed and updated as appropriate: allergies, current medications, past family history, past medical history, past social history, past surgical history and problem list.    Review of Systems   Constitutional: Negative for fatigue and fever.   HENT: Positive for congestion. Negative for trouble swallowing.    Eyes: Negative for discharge and visual disturbance.   Respiratory: Positive for shortness of breath. Negative for choking.    Cardiovascular: Positive for chest pain and leg swelling. Negative for palpitations.   Gastrointestinal: Negative for abdominal pain and blood in stool.   Endocrine: Negative.    Genitourinary: Negative for genital sores and hematuria.   Musculoskeletal: Negative  for gait problem and joint swelling.   Skin: Negative for color change, pallor, rash and wound.   Allergic/Immunologic: Positive for environmental allergies. Negative for immunocompromised state.   Neurological: Negative for facial asymmetry and speech difficulty.   Psychiatric/Behavioral: Negative for hallucinations and suicidal ideas.       Objective   Physical Exam  Vitals and nursing note reviewed.   Constitutional:       Appearance: Normal appearance. He is well-developed.   HENT:      Head: Normocephalic and atraumatic.      Nose: Nose normal.      Mouth/Throat:      Mouth: Mucous membranes are moist.      Pharynx: Oropharynx is clear.   Eyes:      Extraocular Movements: Extraocular movements intact.      Conjunctiva/sclera: Conjunctivae normal.      Pupils: Pupils are equal, round, and reactive to light.   Cardiovascular:      Rate and Rhythm: Normal rate and regular rhythm.      Heart sounds: Murmur heard.   Friction rub present. Gallop present.    Pulmonary:      Effort: Pulmonary effort is normal. No respiratory distress.      Breath sounds: No stridor. Rales present. No wheezing or rhonchi.   Chest:      Chest wall: No tenderness.   Abdominal:      General: Bowel sounds are normal.      Palpations: Abdomen is soft.   Musculoskeletal:         General: Normal range of motion.      Cervical back: Normal range of motion and neck supple.      Right lower leg: Edema present.      Left lower leg: Edema present.   Skin:     General: Skin is warm and dry.   Neurological:      General: No focal deficit present.      Mental Status: He is alert and oriented to person, place, and time. Mental status is at baseline.   Psychiatric:         Mood and Affect: Mood normal.         Behavior: Behavior normal.         Thought Content: Thought content normal.         Judgment: Judgment normal.         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[facility])    Relevant Medications    tadalafil (CIALIS) 10 MG tablet          Gastrointestinal Abdominal             GERD (gastroesophageal reflux disease)    Relevant Orders    CBC & Differential (Completed)    Comprehensive Metabolic Panel (Completed)    proBNP (Completed)          Pulmonary and Pneumonias            COPD (chronic obstructive pulmonary disease) (CMS/[facility])    Relevant Orders    CBC & Differential (Completed)    Comprehensive Metabolic Panel (Completed)    proBNP (Completed)            Other Visit Diagnoses     SOB (shortness of breath)    -  Primary    Relevant Orders    ECG 12 Lead    XR Chest PA & Lateral    CBC & Differential (Completed)    Comprehensive Metabolic Panel (Completed)    proBNP (Completed)    Hypothyroidism, unspecified type        Relevant Orders    Thyroid Panel With TSH (Completed)      Diagnoses     Diagnosis Codes Comments    SOB (shortness of breath)    -  Primary ICD-10-CM: R06.02  ICD-9-CM: 786.05     Essential hypertension     ICD-10-CM: I10  ICD-9-CM: 401.9     Chronic bronchitis, unspecified chronic bronchitis type (CMS/[facility])     ICD-10-CM: J42  ICD-9-CM: 491.9     Gastroesophageal reflux disease with esophagitis without hemorrhage     ICD-10-CM: K21.00  ICD-9-CM: 530.81, 530.10     Hypothyroidism, unspecified type     ICD-10-CM: E03.9  ICD-9-CM: 244.9     Combined systolic and diastolic congestive heart failure, unspecified HF chronicity (CMS/[facility])     ICD-10-CM: I50.40  ICD-9-CM: 428.40     Myocardial infarction involving left main coronary artery, unspecified MI type (CMS/[facility])     ICD-10-CM: I21.01  ICD-9-CM: 410.10

## 2021-09-14 NOTE — TELEPHONE ENCOUNTER
Patient informed received paperwork but need appointment to document the reasons why has appt tomorrow

## 2021-09-14 NOTE — TELEPHONE ENCOUNTER
Caller: Narciso Romano    Relationship to patient: Self    Best call back number: [phone]    Patient is needing: PATIENT CALLED STATING THAT HE RECENTLY FAXED PAPERWORK FOR ORDER FOR CHAIR LIFT TO OFFICE. PATIENT STATES THAT HE HAS NOT HEARD BACK REGARDING PAPERWORK AND WOULD LIKE TO KNOW STATUS OF ORDER FOR CHAIR LIFT

## 2021-09-15 PROBLEM — Z99.2 DIALYSIS PATIENT (HCC): Status: ACTIVE | Noted: 2021-01-01

## 2021-09-15 NOTE — PROGRESS NOTES
QUICK REFERENCE INFORMATION:  The ABCs of the Annual Wellness Visit    Subsequent Medicare Wellness Visit patient was seen for Medicare wellness exam.  Patient was seen for COPD.  Patient is using albuterol for rescue inhaler and uses it occasionally.  Patient is using Symbicort 160/4.52 puffs twice daily rinse and spit.  Patient has a history of hypoxia and is on home O2.  Patient went to his pulmonary specialist and had his oxygen level recorded while on his oxygen.  It was 94%.  Patient had his oxygen taken off for 30 minutes while in the office and his oxygen saturation was 88%.  Patient definitely needs his home O2 and would be hypoxic without it.  This would put extreme strain on his heart.  It is especially important because he has congestive heart failure.  Patient is very weak and unable to ambulate.  Patient is having renal dialysis for can renal failure.  Patient also has severe congestive heart failure and has trouble ambulating.  Patient is required to use a wheelchair while ambulating.  Patient needs a stair lift to help him ambulate at home.  Patient's activity of daily living would greatly increase with both his home O2 and stair lift.    Dictated utilizing Dragon dictation. If there are questions or for further clarification, please contact me.    HEALTH RISK ASSESSMENT    1945    Recent Hospitalizations:  Recently treated at the following:  Other: [facility].        Current Medical Providers:  Patient Care Team:  Osmin Latif MD as PCP - General (Internal Medicine)  Wil Chavez MD as Consulting Physician (Cardiology)        Smoking Status:  Social History     Tobacco Use   Smoking Status Former Smoker   • Packs/day: 1.00   • Years: 30.00   • Pack years: 30.00   Smokeless Tobacco Never Used       Alcohol Consumption:  Social History     Substance and Sexual Activity   Alcohol Use No       Depression Screen:   PHQ-2/PHQ-9 Depression Screening 9/15/2021   Little interest or pleasure  in doing things 0   Feeling down, depressed, or hopeless 0   Trouble falling or staying asleep, or sleeping too much 0   Feeling tired or having little energy 0   Poor appetite or overeating 0   Feeling bad about yourself - or that you are a failure or have let yourself or your family down 0   Trouble concentrating on things, such as reading the newspaper or watching television 0   Moving or speaking so slowly that other people could have noticed. Or the opposite - being so fidgety or restless that you have been moving around a lot more than usual 0   Thoughts that you would be better off dead, or of hurting yourself in some way 0   Total Score 0   If you checked off any problems, how difficult have these problems made it for you to do your work, take care of things at home, or get along with other people? Not difficult at all       Health Habits and Functional and Cognitive Screening:  Functional & Cognitive Status 9/15/2021   Do you have difficulty preparing food and eating? No   Do you have difficulty bathing yourself, getting dressed or grooming yourself? No   Do you have difficulty using the toilet? No   Do you have difficulty moving around from place to place? No   Do you have trouble with steps or getting out of a bed or a chair? No   Current Diet Well Balanced Diet   Dental Exam Up to date   Eye Exam Up to date   Exercise (times per week) 7 times per week   Current Exercises Include Aerobics   Current Exercise Activities Include -   Do you need help using the phone?  No   Are you deaf or do you have serious difficulty hearing?  No   Do you need help with transportation? No   Do you need help shopping? No   Do you need help preparing meals?  No   Do you need help with housework?  No   Do you need help with laundry? No   Do you need help taking your medications? No   Do you need help managing money? No   Do you ever drive or ride in a car without wearing a seat belt? No   Have you felt unusual stress, anger or  loneliness in the last month? No   Who do you live with? Spouse   If you need help, do you have trouble finding someone available to you? No   Have you been bothered in the last four weeks by sexual problems? No   Do you have difficulty concentrating, remembering or making decisions? No           Does the patient have evidence of cognitive impairment? No    Aspirin use counseling: Taking ASA appropriately as indicated      Recent Lab Results:  CMP:  Lab Results   Component Value Date    BUN 40 (H) 07/29/2021    CREATININE 2.45 (H) 07/29/2021    EGFRIFAFRI 31 (L) 07/29/2021    BCR 16.3 07/29/2021     07/29/2021    K 6.2 (C) 07/29/2021    CO2 18.9 (L) 07/29/2021    CALCIUM 10.2 07/29/2021    ALBUMIN 4.00 07/29/2021    BILITOT 0.7 07/29/2021    ALKPHOS 81 07/29/2021    AST 20 07/29/2021    ALT 10 07/29/2021     Lipid Panel:  Lab Results   Component Value Date    CHOL 234 (H) 08/24/2020    TRIG 110 08/24/2020    HDL 75 (H) 08/24/2020    VLDL 22 08/24/2020    LDLHDL 1.83 08/24/2020     HbA1c:       Visual Acuity:  No exam data present    Age-appropriate Screening Schedule:  Refer to the list below for future screening recommendations based on patient's age, sex and/or medical conditions. Orders for these recommended tests are listed in the plan section. The patient has been provided with a written plan.    Health Maintenance   Topic Date Due   • TDAP/TD VACCINES (1 - Tdap) Never done   • LIPID PANEL  08/24/2021   • INFLUENZA VACCINE  10/01/2021   • ZOSTER VACCINE (2 of 2) 10/02/2021        Subjective   History of Present Illness    Narciso Romano is a 75 y.o. male who presents for an Subsequent Wellness Visit.    The following portions of the patient's history were reviewed and updated as appropriate: allergies, current medications, past family history, past medical history, past social history, past surgical history and problem list.    Outpatient Medications Prior to Visit   Medication Sig Dispense Refill   •  acyclovir (ZOVIRAX) 800 MG tablet Take 1 tablet by mouth 5 (Five) Times a Day. 35 tablet 2   • albuterol sulfate  (90 Base) MCG/ACT inhaler Inhale 2 puffs Every 4 (Four) Hours As Needed for Wheezing. 18 g 3   • aspirin 81 MG EC tablet Take 81 mg by mouth Daily.     • atorvastatin (LIPITOR) 20 MG tablet Take 1 tablet by mouth Daily. For cholesterol 30 tablet 1   • azelastine (ASTELIN) 0.1 % nasal spray Use in each nostril as directed 1-2 sprays each nares every 12 hours as needed congestion 1 each 12   • budesonide-formoterol (SYMBICORT) 160-4.5 MCG/ACT inhaler      • bumetanide (BUMEX) 2 MG tablet TAKE 1 TAB BY MOUTH 2 TIMES A DAY FOR 30 DAYS  11   • carvedilol (COREG) 12.5 MG tablet Take 12.5 mg by mouth 2 (Two) Times a Day With Meals.     • cinacalcet (SENSIPAR) 30 MG tablet Take 30 mg by mouth Daily.     • dicyclomine (BENTYL) 10 MG capsule Take 10 mg by mouth 4 (Four) Times a Day.     • docusate sodium 100 MG capsule 100 mg.     • ELIQUIS 2.5 MG tablet tablet Take 1 tablet by mouth 2 (Two) Times a Day. 60 tablet 5   • finasteride (PROSCAR) 5 MG tablet Take 5 mg by mouth daily.     • HYDROcodone-acetaminophen (NORCO) 5-325 MG per tablet Take 1 tablet by mouth Every 6 (Six) Hours As Needed for Moderate Pain . 30 tablet 0   • ipratropium-albuterol (DUO-NEB) 0.5-2.5 mg/3 ml nebulizer Take 3 mL by nebulization Every 4 (Four) Hours As Needed for Wheezing. 120 mL 1   • isosorbide dinitrate (ISORDIL) 20 MG tablet Take 1 tablet by mouth 3 (Three) Times a Day. 90 tablet 3   • levothyroxine sodium (TIROSINT) 25 MCG capsule Take 4 capsules by mouth Daily. Take this amt only!!!!pt states taking 25 not 100 (Patient taking differently: Take 25 mcg by mouth Daily. Take this amt only!!!!pt states taking 25 not 100) 90 capsule 4   • lidocaine (Lidoderm) 5 %      • metoprolol succinate XL (TOPROL-XL) 25 MG 24 hr tablet Take 1 tablet by mouth 2 (Two) Times a Day. 60 tablet 4   • montelukast (SINGULAIR) 10 MG tablet Take 1  tablet by mouth Every Night. 30 tablet 5   • NIFEdipine CC (ADALAT CC) 90 MG 24 hr tablet Take 1 tablet by mouth Daily. 90 tablet 3   • nitroglycerin (NITROSTAT) 0.4 MG SL tablet PLACE 1 TABLET UNDER THE TONGUE EVERY 5 MINUTES AS NEEDED FOR CHEST PAIN. TAKE NO MORE THAN 3 DOSES IN 15 MINUTES. 100 tablet 3   • polyethylene glycol (MIRALAX) 17 GM/SCOOP powder MIX 1 CAPFUL WITH WATER AND DRINK DAILY     • tamsulosin (FLOMAX) 0.4 MG capsule 24 hr capsule TAKE ONE CAPSULE EVERY DAY 90 capsule 3   • ciprofloxacin (Cipro) 500 MG tablet Take 1 tablet by mouth 2 (Two) Times a Day. 28 tablet 0     No facility-administered medications prior to visit.       Patient Active Problem List   Diagnosis   • DVT (deep venous thrombosis) (CMS/[facility])   • Essential hypertension   • Postoperative hypothyroidism   • BPH (benign prostatic hyperplasia)   • Hyperlipidemia   • GERD (gastroesophageal reflux disease)   • COPD (chronic obstructive pulmonary disease) (CMS/[facility])   • Renal disease   • Myocardial infarction (CMS/HCC)   • History of DVT of lower extremity   • History of colon polyps   • Change in bowel habits   • Combined systolic and diastolic congestive heart failure (CMS/HCC)   • Dialysis patient (CMS/[facility])       Advance Care Planning:  ACP discussion was held with the patient during this visit. Patient does not have an advance directive, information provided.    Identification of Risk Factors:  Risk factors include: Advance Directive Discussion.    Review of Systems   Constitutional: Positive for fatigue. Negative for fever.   HENT: Positive for congestion. Negative for trouble swallowing.    Eyes: Negative for discharge and visual disturbance.   Respiratory: Positive for shortness of breath. Negative for choking.    Cardiovascular: Negative for chest pain and palpitations.   Gastrointestinal: Negative for abdominal pain and blood in stool.   Endocrine: Negative.    Genitourinary: Negative for genital sores and hematuria.    Musculoskeletal: Negative for gait problem and joint swelling.   Skin: Negative for color change, pallor, rash and wound.   Allergic/Immunologic: Positive for environmental allergies. Negative for immunocompromised state.   Neurological: Positive for weakness. Negative for facial asymmetry and speech difficulty.   Psychiatric/Behavioral: Negative for hallucinations and suicidal ideas.       Compared to one year ago, the patient feels his physical health is worse.  Compared to one year ago, the patient feels his mental health is the same.    Objective     Physical Exam  Vitals and nursing note reviewed.   Constitutional:       Appearance: Normal appearance. He is well-developed.   HENT:      Head: Normocephalic and atraumatic.      Nose: Nose normal.      Mouth/Throat:      Mouth: Mucous membranes are moist.      Pharynx: Oropharynx is clear.   Eyes:      Extraocular Movements: Extraocular movements intact.      Conjunctiva/sclera: Conjunctivae normal.      Pupils: Pupils are equal, round, and reactive to light.   Cardiovascular:      Rate and Rhythm: Normal rate and regular rhythm.      Heart sounds: No murmur heard.   No friction rub. Gallop present.    Pulmonary:      Effort: Pulmonary effort is normal. No respiratory distress.      Breath sounds: No stridor. Rales present. No wheezing or rhonchi.   Chest:      Chest wall: No tenderness.   Abdominal:      General: Bowel sounds are normal.      Palpations: Abdomen is soft.   Musculoskeletal:         General: Normal range of motion.      Cervical back: Normal range of motion and neck supple.   Skin:     General: Skin is warm and dry.   Neurological:      General: No focal deficit present.      Mental Status: He is alert and oriented to person, place, and time. Mental status is at baseline.      Motor: Weakness present.      Coordination: Coordination abnormal.      Gait: Gait abnormal.   Psychiatric:         Mood and Affect: Mood normal.         Behavior: Behavior  "normal.         Thought Content: Thought content normal.         Judgment: Judgment normal.         Vitals:    09/15/21 0930 09/15/21 1021   BP: 140/62    BP Location: Left arm    Patient Position: Sitting    Cuff Size: Adult    Pulse: 76    Resp: 15    Temp: 96.9 °F (36.1 °C)    TempSrc: Infrared    SpO2: 93%  Comment: with 2L of O2 (Abnormal) 88%  Comment: without O2   Weight: 62.1 kg (137 lb)    Height: 182.9 cm (72.01\")    PainSc: 0-No pain        Patient's Body mass index is 18.58 kg/m². indicating that he is within normal range (BMI 18.5-24.9). No BMI management plan needed..      Assessment/Plan #1 recertify home O2, stair lift #2 continue dialysis #3 continue monitoring blood pressure at home  Patient Self-Management and Personalized Health Advice  The patient has been provided with information about: NA and preventive services including:   · NA.    Visit Diagnoses:    ICD-10-CM ICD-9-CM   1. Medicare annual wellness visit, subsequent  Z00.00 V70.0   2. Chronic obstructive pulmonary disease, unspecified COPD type (CMS/HCC)  J44.9 496   3. Combined systolic and diastolic congestive heart failure, unspecified HF chronicity (CMS/HCC)  I50.40 428.40   4. Dialysis patient (CMS/HCC)  Z99.2 V45.11       No orders of the defined types were placed in this encounter.      Outpatient Encounter Medications as of 9/15/2021   Medication Sig Dispense Refill   • acyclovir (ZOVIRAX) 800 MG tablet Take 1 tablet by mouth 5 (Five) Times a Day. 35 tablet 2   • albuterol sulfate  (90 Base) MCG/ACT inhaler Inhale 2 puffs Every 4 (Four) Hours As Needed for Wheezing. 18 g 3   • aspirin 81 MG EC tablet Take 81 mg by mouth Daily.     • atorvastatin (LIPITOR) 20 MG tablet Take 1 tablet by mouth Daily. For cholesterol 30 tablet 1   • azelastine (ASTELIN) 0.1 % nasal spray Use in each nostril as directed 1-2 sprays each nares every 12 hours as needed congestion 1 each 12   • budesonide-formoterol (SYMBICORT) 160-4.5 MCG/ACT " inhaler      • bumetanide (BUMEX) 2 MG tablet TAKE 1 TAB BY MOUTH 2 TIMES A DAY FOR 30 DAYS  11   • carvedilol (COREG) 12.5 MG tablet Take 12.5 mg by mouth 2 (Two) Times a Day With Meals.     • cinacalcet (SENSIPAR) 30 MG tablet Take 30 mg by mouth Daily.     • dicyclomine (BENTYL) 10 MG capsule Take 10 mg by mouth 4 (Four) Times a Day.     • docusate sodium 100 MG capsule 100 mg.     • ELIQUIS 2.5 MG tablet tablet Take 1 tablet by mouth 2 (Two) Times a Day. 60 tablet 5   • finasteride (PROSCAR) 5 MG tablet Take 5 mg by mouth daily.     • HYDROcodone-acetaminophen (NORCO) 5-325 MG per tablet Take 1 tablet by mouth Every 6 (Six) Hours As Needed for Moderate Pain . 30 tablet 0   • ipratropium-albuterol (DUO-NEB) 0.5-2.5 mg/3 ml nebulizer Take 3 mL by nebulization Every 4 (Four) Hours As Needed for Wheezing. 120 mL 1   • isosorbide dinitrate (ISORDIL) 20 MG tablet Take 1 tablet by mouth 3 (Three) Times a Day. 90 tablet 3   • levothyroxine sodium (TIROSINT) 25 MCG capsule Take 4 capsules by mouth Daily. Take this amt only!!!!pt states taking 25 not 100 (Patient taking differently: Take 25 mcg by mouth Daily. Take this amt only!!!!pt states taking 25 not 100) 90 capsule 4   • lidocaine (Lidoderm) 5 %      • metoprolol succinate XL (TOPROL-XL) 25 MG 24 hr tablet Take 1 tablet by mouth 2 (Two) Times a Day. 60 tablet 4   • montelukast (SINGULAIR) 10 MG tablet Take 1 tablet by mouth Every Night. 30 tablet 5   • NIFEdipine CC (ADALAT CC) 90 MG 24 hr tablet Take 1 tablet by mouth Daily. 90 tablet 3   • nitroglycerin (NITROSTAT) 0.4 MG SL tablet PLACE 1 TABLET UNDER THE TONGUE EVERY 5 MINUTES AS NEEDED FOR CHEST PAIN. TAKE NO MORE THAN 3 DOSES IN 15 MINUTES. 100 tablet 3   • polyethylene glycol (MIRALAX) 17 GM/SCOOP powder MIX 1 CAPFUL WITH WATER AND DRINK DAILY     • tamsulosin (FLOMAX) 0.4 MG capsule 24 hr capsule TAKE ONE CAPSULE EVERY DAY 90 capsule 3   • [DISCONTINUED] ciprofloxacin (Cipro) 500 MG tablet Take 1 tablet by  mouth 2 (Two) Times a Day. 28 tablet 0     No facility-administered encounter medications on file as of 9/15/2021.       Reviewed use of high risk medication in the elderly: not applicable  Reviewed for potential of harmful drug interactions in the elderly: not applicable    Follow Up:  Return in about 4 weeks (around 10/13/2021), or if symptoms worsen or fail to improve, for Recheck.     An After Visit Summary and PPPS with all of these plans were given to the patient.

## 2021-09-15 NOTE — PATIENT INSTRUCTIONS
Medicare Wellness  Personal Prevention Plan of Service     Date of Office Visit:  09/15/2021  Encounter Provider:  Osmin Latif MD  Place of Service:  [facility]  Patient Name: Narciso Romano  :  1945    As part of the Medicare Wellness portion of your visit today, we are providing you with this personalized preventive plan of services (PPPS). This plan is based upon recommendations of the [place] Preventive Services Task Force (USPSTF) and the Advisory Committee on Immunization Practices (ACIP).    This lists the preventive care services that should be considered, and provides dates of when you are due. Items listed as completed are up-to-date and do not require any further intervention.    Health Maintenance   Topic Date Due   • Hepatitis B (1 of 3 - Risk 3-dose series) Never done   • TDAP/TD VACCINES (1 - Tdap) Never done   • LIPID PANEL  2021   • INFLUENZA VACCINE  10/01/2021   • ZOSTER VACCINE (2 of 2) 10/02/2021   • ANNUAL WELLNESS VISIT  09/15/2022   • COLORECTAL CANCER SCREENING  2030   • HEPATITIS C SCREENING  Completed   • COVID-19 Vaccine  Completed   • Pneumococcal Vaccine 65+  Completed   • AAA SCREEN (ONE-TIME)  Completed       No orders of the defined types were placed in this encounter.      No follow-ups on file.

## 2021-10-08 NOTE — TELEPHONE ENCOUNTER
Caller: BRENNEN     Relationship to patient: Other/ HUMAN NURSES LINE    Best call back number: [phone] (CONFIDENTIAL VOICEMAIL AVAILABLE)     Patient is needing: BRENNEN FROM [facility] NURSES LINE CALLED AND WANTED TO KNOW IF THE ELIQUIS 2.5 MG tablet tablet CAN BE ORDERED FOR 90 DAYS AND SENT TO EITHER THE [facility] OR [facility] WHEN THE NEXT REFILL COMES THROUGH? HE WAS HAS BEEN SPENDING MONEY ON THE PRESCRIPTION INSTEAD OF USING HIS TIER EXEMPTION THAT WOULD HAVE COVERED IT. ALSO BRENNEN WANTED TO KNOW HE IS CAN GET A FULL SIZED STAND UP WALKER THAT HE DOES NOT NEED TO BEND OVER TO USE. [facility] NEEDS A PRIOR AUTHORIZATION SO THEY CAN FILL THE REQUEST FOR THE PATIENT. PLEASE CALL AND ADVISE.

## 2021-10-12 NOTE — TELEPHONE ENCOUNTER
Send 90 day script eliquis 2.5 to [facility] please then I will be sent the prior auth to work on

## 2021-10-12 NOTE — TELEPHONE ENCOUNTER
Caller: Narciso Romano    Relationship: Self    Medication requested (name and dosage): carvedilol (COREG) 12.5 MG tablet    Pharmacy where request should be sent: [facility] [address] - [phone]  - [phone]     Additional details provided by patient: PATIENT HAS 3 PILLS LEFT     Best call back number: [phone]    Does the patient have less than a 3 day supply:  [x] Yes  [] No    Ruben Figueroa Rep   10/12/21 09:50 EDT

## 2021-10-29 NOTE — TELEPHONE ENCOUNTER
Caller: ANA HEADLEY    Relationship:     Best call back number: [phone], EXT, [phone]      What medication are you requesting: UPRIGHT WALKER          If a prescription is needed, what is your preferred pharmacy and phone number:  [facility] PHONE NUMBER: [phone]    Additional notes:PATIENT IS NEEDING AN UPRIGHT OR STAND UP WALKER ASAP CALLED INTO [facility] STATED THAT THEY ARE REQUESTING THE PATIENT'S HEIGHT, WEIGHT, CHART NOTES, DEMOGRAPHICS, AND THE PRESCRIPTION.

## 2021-11-08 NOTE — TELEPHONE ENCOUNTER
Caller: Narciso Romano    Relationship: Self    Best call back number:     What medications are you currently taking:      isosorbide dinitrate (ISORDIL) 20 MG tablet       When did you start taking these medications: 07/16/21    Which medication are you concerned about:   isosorbide dinitrate (ISORDIL) 20 MG tablet         Who prescribed you this medication:     What are your concerns: PATIENT IS CALLING TO ASK DR. HERRON IF HE CAN GO BACK TO THE MEDICATION HE WAS TAKING PRIOR STARTING THE ABOVE MEDICATION.  PATIENT STATES HIS CARDIOLOGIST TOLD HIM THE PREVIOUS MEDICATION WAS WORKING.  HE STATES HE HAS TO PAY 5 TIMES THE AMOUNT FOR THE CURRENT MEDICATION AS HE WAS FOR THE MEDICATION HE WAS ON PRIOR TO THIS ONE.  PATIENT COULD  NOT TELL ME THE NAME OF THE PREVIOUS MEDICATION. IF DR. HERRON IS WILLING TO SWITCH THE MEDICATION PATIENT WILL NEED A NEW PRESCRIPTION OF THE PREVIOUS MEDICATION.      [facility] [address] AT [address] [place] [address] - [phone]  - [phone]   [phone]    PLEASE ADVISE.

## 2021-11-11 NOTE — TELEPHONE ENCOUNTER
Pt was on isosorbide mononitrate and it was changed to isosorbide dinitrate. This medication is 5x more expensive and he was doing fine on the isosorbide mononitrate and [facility] [address] [address]

## 2021-11-15 ENCOUNTER — TELEPHONE (OUTPATIENT)
Dept: FAMILY MEDICINE CLINIC | Facility: CLINIC | Age: 76
End: 2021-11-15

## 2021-11-15 NOTE — TELEPHONE ENCOUNTER
Caller: Keila Romano    Relationship to patient: Emergency Contact    Best call back number: [phone]    Patient is needing:  PATIENT PASSED AWAY

## 2021-11-16 ENCOUNTER — TELEPHONE (OUTPATIENT)
Dept: FAMILY MEDICINE CLINIC | Facility: CLINIC | Age: 76
End: 2021-11-16

## 2021-11-16 DIAGNOSIS — M19.90 ARTHRITIS: ICD-10-CM

## 2021-11-16 DIAGNOSIS — M10.9 ARTICULAR GOUT: ICD-10-CM

## 2021-11-16 DIAGNOSIS — Z99.2 END STAGE RENAL FAILURE ON DIALYSIS (HCC): ICD-10-CM

## 2021-11-16 DIAGNOSIS — I21.01 MYOCARDIAL INFARCTION INVOLVING LEFT MAIN CORONARY ARTERY, UNSPECIFIED MI TYPE (HCC): ICD-10-CM

## 2021-11-16 DIAGNOSIS — N18.6 END STAGE RENAL FAILURE ON DIALYSIS (HCC): ICD-10-CM

## 2021-11-16 DIAGNOSIS — Z86.718 HISTORY OF DVT OF LOWER EXTREMITY: ICD-10-CM

## 2021-11-16 DIAGNOSIS — J44.9 CHRONIC OBSTRUCTIVE PULMONARY DISEASE, UNSPECIFIED COPD TYPE (HCC): Primary | ICD-10-CM

## 2021-11-16 PROBLEM — T82.858A ARTERIOVENOUS FISTULA STENOSIS (HCC): Status: ACTIVE | Noted: 2021-11-16

## 2021-11-16 PROBLEM — I48.91 ATRIAL FIBRILLATION (HCC): Status: ACTIVE | Noted: 2021-11-16

## 2021-11-16 PROBLEM — R19.8 ALTERED BOWEL FUNCTION: Status: ACTIVE | Noted: 2019-09-09

## 2021-11-16 PROBLEM — Z91.89 AT RISK OF DISEASE: Status: ACTIVE | Noted: 2021-11-16

## 2021-11-16 PROBLEM — R07.9 ACUTE CHEST PAIN: Status: ACTIVE | Noted: 2020-06-05

## 2022-03-18 ENCOUNTER — TELEPHONE (OUTPATIENT)
Dept: FAMILY MEDICINE CLINIC | Facility: CLINIC | Age: 77
End: 2022-03-18

## 2022-03-18 NOTE — TELEPHONE ENCOUNTER
Caller: Keila Galeana    Relationship: Emergency Contact    Best call back number: [phone]    What form or medical record are you requesting:  CLAIM AUTHORIZATION [id number]    Who is requesting this form or medical record from you: KEILA GALEANA    How would you like to receive the form or medical records (pick-up, mail, fax):   MAIL  If fax, what is the fax number:    If mail, what is the address: [facility] [address], [facility] [address]  If pick-up, provide patient with address and location details    Timeframe paperwork needed: AS SOON AS POSSIBLE    Additional notes:   THIS IS FOR THE STAIR LIFT FOR  .  ANA IS STATING THAT DR HERRON NEVER SUBMITTED THE FORMS TO BE APPROVED AND THEY NEED THE AUTHORIZATION FORM FROM DR HERRON BEFORE THEY WILL PAY FOR THE STAIR LIFT CHAIR.    2021 IS WHEN IT WAS FIRST REQUESTED    KEILA STATED THAT IF [facility] IS TRYING TO REIMBURSE PATIENT THAT HE IS  AND THE REIMBURSEMENT WOULD COME TO HER.

## 2022-09-29 ENCOUNTER — TELEPHONE (OUTPATIENT)
Dept: FAMILY MEDICINE CLINIC | Facility: CLINIC | Age: 77
End: 2022-09-29

## 2022-09-29 NOTE — TELEPHONE ENCOUNTER
Caller: Keila Galeana    Relationship: Emergency Contact    Best call back number: [phone] (M)    What is the best time to reach you: ANYTIME, ASAP    Who are you requesting to speak with (clinical staff, provider,  specific staff member): CLINICAL STAFF/ DANA WITH DR HERRON SPECIFICALLY    Do you know the name of the person who called: KEILA GALEANA    What was the call regarding: PATIENT'S WIFE STATES SHE IS STILL TRYING TO GET REIMBURSEMENT FOR THE CHAIR LIFT FOR THIS  PATIENT- [facility] IS STATING THAT DR HERRON NEVER GAVE PRIOR AUTH FOR THIS BUT PATIENT'S WIFE IS STATING THAT HE DID, PATIENT'S WIFE IS ASKING FOR DANA TO CALL HER BACK ASAP REGARDING THIS SO IT CAN BE TAKEN CARE OF ASAP      PRIOR AUTH NEEDS TAX ID OR NPI # ON PRIOR AUTH FROM [facility] AT [employer] PHONE [phone] CLAIM# [id number]    Do you require a callback: YES, ASAP

## 2022-09-29 NOTE — TELEPHONE ENCOUNTER
Called [employer] #[phone] transferred twice finally talked to someone named  SEGUN who gave me another # [phone]. Ref call # [id number] then gave me claim #[id number]  and records fax # [phone].   Going to fax every thing I have and see if helps.      Then called patient informed on v/m what I was doing

## 2022-11-11 ENCOUNTER — TELEPHONE (OUTPATIENT)
Dept: FAMILY MEDICINE CLINIC | Facility: CLINIC | Age: 77
End: 2022-11-11

## 2022-11-11 NOTE — TELEPHONE ENCOUNTER
NEEDS A AUTHORIZATION FROM DR HERRON FOR A STAIR LIFT THAT HE GOT BACK IN September OF 2021. THEY ARE GETTING A BILL FOR $3300 . PATIENT PASSED AWAY 11-13-21.

## 2022-11-11 NOTE — TELEPHONE ENCOUNTER
We will need to try to get a hold of the insurance carrier and find out why they got the bill for that much

## 2022-11-14 NOTE — TELEPHONE ENCOUNTER
I called Dian[facility] went through 2 people Jodi and Leticia neither spoke good english said family needed to call [phone] and up date ins.    Got chair request on 9/15/21  claim #s given and ref call #

## 2023-06-26 NOTE — OP NOTE
ELBOW OLECRANNON BURSA EXCISION  Procedure Note    Narciso Romano  9/19/2018    Pre-op Diagnosis:   1.  Right olecranon bursitis  2.   3.     Post-op Diagnosis:     1.  Same  2.   3.     Procedure(s):  1.  Right excisional biopsy of olecranon bursa  2.   3.   4.     Surgeon(s):  Marcell Martinez MD    Anesthesia: General  Anesthesiologist: Gregory Kendall MD  CRNA: Fransisco Contreras CRNA    Staff:   Circulator: Brandie Omer RN  Scrub Person: Kecia Phillips      Drains: None    Estimated Blood Loss: minimal    Specimen:   Order Name Source Comment Collection Info Order Time   BASIC METABOLIC PANEL   Collected By: Rochelle Sharma RN 9/19/2018 12:00 PM   PROTIME-INR   Collected By: Rochelle Sharma RN 9/19/2018 12:00 PM   TISSUE PATHOLOGY EXAM Elbow, Right  Collected By: Marcell Martinez MD 9/19/2018  2:08 PM       Description of Procedure: Following induction of anesthesia a tourniquet was placed on the right upper arm.  The right upper extremity was prepped and draped in a sterile fashion.  The limb was held elevated while the tourniquet was inflated to 200 mmHg pressure.  I made a curvilinear incision over the olecranon tip.  I carefully divided subcutaneous tissue exposing the olecranon bursa.  The bursal tissue was very solid and it appeared that some bleeding had occurred and it was an organizing hematoma.  I dissected medially and laterally until we had freed up all of the adherent bursal tissue.  Bursal tissue was then removed and placed into a specimen cup.  I then irrigated our incision and use a Ronjair to remove  a small amount of remaining tissue.  I then performed closure of the incision with interrupted 2-0 Ethibond.  Patient was in place into a soft sterile dressing and the tourniquet released.  He'll be taken to recovery and then discharged home.  His prognosis is good.    Findings: See Dictation    Complications: None      Marcell Martinez MD     Date: 9/19/2018  Time: 2:15 PM   No

## 2023-11-21 NOTE — PROGRESS NOTES
Subjective   Narciso Romano is a 73 y.o. male.   Medicare wellness visit subsequent also DVT, needs Coumadin for INR as well as recent fall injuring left knee  History of Present Illness care wellness visit subsequent needs follow-up on INR for which she takes for DVT recent fall going up steps foot got caught on the last step and falling forward onto a carpeted wooden floor pain in left knee since that time walk with some limp persistent swelling the last couple weeks.    Patient is a former smoker, family history positive for hypertension heart disease thyroid lung and kidney disease.    Is having several including Keflex causing hives Cipro same Levoxyl lisinopril Norvasc and Tiazac  Review of Systems   All other systems reviewed and are negative.      Objective   There were no vitals filed for this visit.      Physical Exam   Constitutional: He appears well-developed and well-nourished.   HENT:   Head: Normocephalic and atraumatic.   Eyes: Conjunctivae are normal. Pupils are equal, round, and reactive to light.   Cardiovascular: Normal rate, regular rhythm and normal heart sounds.   Pulmonary/Chest: Effort normal and breath sounds normal.   Abdominal: Soft. Bowel sounds are normal.   Musculoskeletal:   Left knee with mild swelling more anteromedially.  Left knee is st normal to stressing AP, lateral, medial motion.  Pain with palpation anteromedial knee.  Is nontender over the patella per se.  Mild discomfort with external rotation lower leg with extension but not internal rotation.   Neurological: He is alert.   Slow gait slightly favoring left leg.   Skin: Skin is warm and dry.   Nursing note and vitals reviewed.      Lab Results   Component Value Date    INR 2.10 (A) 01/21/2019    INR 1.50 (A) 01/08/2019    INR 2.10 (A) 12/21/2018       Procedures  Left knee x-ray AP and lateral obtained.  Significant narrowing of the menisci.  Some degenerative changes noted.  No effusion is noted.  Some calcification  arterial nature seen on lateral view.  There is no comparison x-ray.  Indication for x-rays persistent knee pain after fall approximately 1-2 weeks duration.  Assessment/Plan   1.  Medicare wellness visit subsequent    2.  DVT l lower extremity    3.  Anticoagulation with Coumadin target range 2.0-3.0 plan continue present Coumadin dose avoid vitamin K rich products and get INR in 2 weeks time    4..  Left knee pain status post fall plan patient see Dr. Martinez group established with him this x-ray goes with patient.  Relative rest    Patient did request pain medicines for increased knee pain he is on warfarin so he could not take NSAIDs.  He had gotten pain medicines from his orthopedic surgeon Dr. Martinez in the past we were able to check with our office confirming started to contract we will give him Lortab 5 1 tablet every 6 hours as needed pain quantity 12 for acute pain.    Much of this encounter note is an electronic transcription/translation of spoken language to printed text.  The electronic translation of spoken language may permit erroneous, or at times, nonsensical words or phrases to be inadvertently transcribed.  Although I have reviewed the note for such errors, some may still exist. If there are questions or for further clarification, please contact me.         cont ssame  inr 2wks   stated

## 2024-12-26 NOTE — TELEPHONE ENCOUNTER
Caller: Narciso Romano    Relationship: Self    Best call back number: [phone] (M)  Medication needed:   Requested Prescriptions     Pending Prescriptions Disp Refills   • nitroglycerin (NITROSTAT) 0.4 MG SL tablet 25 tablet 3     Sig: Place 1 tablet under the tongue Every 5 (Five) Minutes As Needed for Chest Pain. Take no more than 3 doses in 15 minutes.       When do you need the refill by: 21    What details did the patient provide when requesting the medication:   patient states completely out the ones he have are     Does the patient have less than a 3 day supply:  [x] Yes  [] No    What is the patient's preferred pharmacy: [id number] - [place], [place] - [address] [place] [address] RD. AT [facility] [phone] [facility] [phone] FX             
4 = No assist / stand by assistance
